# Patient Record
Sex: FEMALE | Race: WHITE | NOT HISPANIC OR LATINO | Employment: FULL TIME | URBAN - NONMETROPOLITAN AREA
[De-identification: names, ages, dates, MRNs, and addresses within clinical notes are randomized per-mention and may not be internally consistent; named-entity substitution may affect disease eponyms.]

---

## 2017-03-01 ENCOUNTER — COMMUNICATION - GICH (OUTPATIENT)
Dept: FAMILY MEDICINE | Facility: OTHER | Age: 42
End: 2017-03-01

## 2017-03-01 ENCOUNTER — HISTORY (OUTPATIENT)
Dept: EMERGENCY MEDICINE | Facility: OTHER | Age: 42
End: 2017-03-01

## 2017-03-01 DIAGNOSIS — F41.1 GENERALIZED ANXIETY DISORDER: ICD-10-CM

## 2017-03-05 ENCOUNTER — COMMUNICATION - GICH (OUTPATIENT)
Dept: FAMILY MEDICINE | Facility: OTHER | Age: 42
End: 2017-03-05

## 2017-03-05 DIAGNOSIS — F41.1 GENERALIZED ANXIETY DISORDER: ICD-10-CM

## 2017-05-05 ENCOUNTER — COMMUNICATION - GICH (OUTPATIENT)
Dept: FAMILY MEDICINE | Facility: OTHER | Age: 42
End: 2017-05-05

## 2017-07-22 ENCOUNTER — COMMUNICATION - GICH (OUTPATIENT)
Dept: FAMILY MEDICINE | Facility: OTHER | Age: 42
End: 2017-07-22

## 2017-07-22 DIAGNOSIS — F41.1 GENERALIZED ANXIETY DISORDER: ICD-10-CM

## 2017-08-28 ENCOUNTER — COMMUNICATION - GICH (OUTPATIENT)
Dept: FAMILY MEDICINE | Facility: OTHER | Age: 42
End: 2017-08-28

## 2017-08-28 DIAGNOSIS — F41.1 GENERALIZED ANXIETY DISORDER: ICD-10-CM

## 2017-09-04 ENCOUNTER — COMMUNICATION - GICH (OUTPATIENT)
Dept: FAMILY MEDICINE | Facility: OTHER | Age: 42
End: 2017-09-04

## 2017-09-04 DIAGNOSIS — F41.1 GENERALIZED ANXIETY DISORDER: ICD-10-CM

## 2017-12-28 NOTE — TELEPHONE ENCOUNTER
Patient Information     Patient Name MRN Dina Peacock [id number] Female 1975      Telephone Encounter by Shannan Pederson RN at 2017  3:16 PM     Author:  Shannan Pederson RN Service:  (none) Author Type:  NURS- Registered Nurse     Filed:  2017  3:19 PM Encounter Date:  2017 Status:  Signed     :  Shannan Pederson RN (NURS- Registered Nurse)            Depression-in adults 18 and over    Office visit in the past 12 months or as indicated in chart.  Should have clinic visit 1-2 months after initial prescription.    Last visit with SANDY ANDRADE was on: 2016 in [facility]  Next visit with SANDY ANDRADE is on: No future appointment listed with this provider  Next visit with Family Practice is on: No future appointment listed in this department    Max refills 12 months from last office visit or per providers notes.    Depression-in adults 18 and over  SSRI    Office visit in the past 12 months or as indicated in chart.  Should have clinic visit 1-2 months after initial prescription.    Max refills 12 months from last office visit or per providers notes.    Patient is due for medication management appointment. Limited refill provided at this time and letter sent for reminder to patient. Prescription refilled per RN Medication Refill Policy.................... Shannan Pederson RN ....................  2017   3:16 PM

## 2017-12-28 NOTE — TELEPHONE ENCOUNTER
Patient Information     Patient Name MRN Sex Dina Spence [id number] Female 1975      Telephone Encounter by Andrew Jarrell RN at 2017  3:10 PM     Author:  Andrew Jarrell RN Service:  (none) Author Type:  NURS- Registered Nurse     Filed:  2017  3:33 PM Encounter Date:  2017 Status:  Signed     :  Andrew Jarrell RN (NURS- Registered Nurse)            This is a Refill request from: [facility] in [facility]  Name of Medication: Sertraline  Quantity requested: 180 tabs  Last fill date: 8/3/17 for a months supply as per rx request  Due for refill: Yes, as per rx request and per chart review  Last visit with LANRE ANDRADE was on: 2016 in [facility]  PCP:  Lanre Andrade MD  Controlled Substance Agreement: N/A   Diagnosis r/t this medication request: Anxiety State     Name of Medication: Wellbutrin  Quantity requested: 180 tabs  Last fill date: 8/3/17 as per rx request for a 30 day supply  Due for refill: Yes, as per chart review and per rx request  Diagnosis r/t this medication request: Anxiety State    Chart review shows that patient remains overdue despite reminders for an office visit with PCP to discuss continued use of both requested rxs (See 3/1/17 and 17 refill requests). Writer attempted to contact patient today to discuss without success. Writer is no longer able to fill rxs as requested. Writer will route rx request to PCP for his consideration/approval. Will send patient an additional reminder letter as well.     Unable to complete prescription refill per RN Medication Refill Policy.................... Andrew Jarrell RN ....................  2017   3:13 PM

## 2017-12-28 NOTE — TELEPHONE ENCOUNTER
Patient Information     Patient Name MRN Sex Dina Spence [id number] Female 1975      Telephone Encounter by Andrew Jarrell RN at 2017 11:23 AM     Author:  Andrew Jarrell RN Service:  (none) Author Type:  NURS- Registered Nurse     Filed:  2017 12:33 PM Encounter Date:  2017 Status:  Signed     :  Andrew Jarrell RN (NURS- Registered Nurse)            This is a Refill request from: [facility] in [facility]  Name of Medication: Buspar  Quantity requested: 180 tabs  Last fill date: 8/3/17 as per rx request  Due for refill: Yes, as per rx request and per chart review  Last visit with LANRE ANDRADE was on: 2016 in [facility]  PCP:  Lanre Andrade MD  Controlled Substance Agreement: N/A   Diagnosis r/t this medication request: Anxiety state    Chart review shows that patient remains overdue for an office visit with PCP despite reminder letters sent x 2 to patient, as well as [employer] messages. No appointment noted at this time. Call placed to patient to discuss at this time. Patient states she is aware of need to be seen, however is not able to schedule an appointment at this time. Will call back later. Writer will pend limited refill to PCP for his consideration/approval.     Unable to complete prescription refill per RN Medication Refill Policy.................... Andrew Jarrell RN ....................  2017   11:23 AM

## 2018-01-03 ENCOUNTER — COMMUNICATION - GICH (OUTPATIENT)
Dept: FAMILY MEDICINE | Facility: OTHER | Age: 43
End: 2018-01-03

## 2018-01-03 DIAGNOSIS — F41.1 GENERALIZED ANXIETY DISORDER: ICD-10-CM

## 2018-01-03 NOTE — TELEPHONE ENCOUNTER
Patient Information     Patient Name MRN Dina Peacock [id number] Female 1975      Telephone Encounter by Andrew Jarrell RN at 3/2/2017 10:04 AM     Author:  Andrew Jarrell RN Service:  (none) Author Type:  NURS- Registered Nurse     Filed:  3/2/2017 10:15 AM Encounter Date:  3/1/2017 Status:  Signed     :  Andrew Jarrell RN (NURS- Registered Nurse)            Depression-in adults 18 and over  SSRI    Office visit in the past 12 months or as indicated in chart.  Should have clinic visit 1-2 months after initial prescription.    Last visit with SANDY ANDRADE was on: 2016 in [facility]  Next visit with SANDY ANDRADE is on: No future appointment listed with this provider  Next visit with Family Practice is on: No future appointment listed in this department    Max refills 12 months from last office visit or per providers notes.    PHQ Depression Screening 2015 6/3/2016   Date of PHQ exam (doc flow) 2015 6/3/2016   1. Lack of interest/pleasure 0 - Not at all 3 - Nearly every day   2. Feeling down/depressed 0 - Not at all 3 - Nearly every day   PHQ-2 TOTAL SCORE 0 6   3. Trouble sleeping 1 - Several days 3 - Nearly every day   4. Decreased energy 1 - Several days 2 - More than half the days   5. Appetite change 1 - Several days 3 - Nearly every day   6. Feelings of failure 0 - Not at all 1 - Several days   7. Trouble concentrating 0 - Not at all 2 - More than half the days   8. Activity level 0 - Not at all 1 - Several days   9. Hurting yourself 0 - Not at all 0 - Not at all   PHQ-9 TOTAL SCORE 3 18   PHQ-9 Severity Level none moderately severe   Functional Impairment not difficult at all very difficult     Patient is due for medication management/physical appointment as last office visit with PCP to address dx of anxiety as well as continued use of sertraline noted to be on 12/4/15. Limited refill provided at this time and letter/MyChart message sent for reminder to  patient. Prescription refilled per RN Medication Refill Policy.................... Andrew Jarrell RN ....................  3/2/2017   10:07 AM

## 2018-01-03 NOTE — TELEPHONE ENCOUNTER
Patient Information     Patient Name MRN Sex Dina Spence [id number] Female 1975      Telephone Encounter by Bell Beckham RN at 3/6/2017  3:22 PM     Author:  Bell Beckham RN Service:  (none) Author Type:  NURS- Registered Nurse     Filed:  3/6/2017  3:24 PM Encounter Date:  3/5/2017 Status:  Signed     :  Bell Beckham RN (NURS- Registered Nurse)            Depression-in adults 18 and over  Office visit in the past 12 months or as indicated in chart.  Should have clinic visit 1-2 months after initial prescription.  Last visit with SANDY ANDRADE was on: 2016 in [facility]  Next visit with SANDY ANDRADE is on: No future appointment listed with this provider  Max refills 12 months from last office visit or per providers notes.  Prescription refilled per RN Medication Refill Policy.................... Bell Beckham RN ....................  3/6/2017   3:23 PM

## 2018-01-03 NOTE — TELEPHONE ENCOUNTER
Patient Information     Patient Name MRN Sex Dina Spence [id number] Female 1975      Telephone Encounter by Andrwe Jarrell RN at 3/2/2017 10:09 AM     Author:  Andrew Jarrell RN Service:  (none) Author Type:  NURS- Registered Nurse     Filed:  3/2/2017 10:15 AM Encounter Date:  3/1/2017 Status:  Signed     :  Andrew Jarrell RN (NURS- Registered Nurse)            This is a Refill request from: [facility]  Name of Medication: Buspar  Quantity requested: 180 tabs  Last fill date: 11/10/16  Due for refill: Yes  Last visit with SANDY SHAH was on: 2016 in [facility]  PCP:  Sandy Shah MD  Controlled Substance Agreement:  N/A   Diagnosis r/t this medication request: Anxiety state    Per chart review, patient was last seen by PCP for dx of anxiety on 12/4/15. However, continued use of buspar was addressed at office visit with PCP on 6/3/16. Patient is due for a renewal of other medication (Sertraline), was sent in a limited supply today. Will pend limited supply of buspar to PCP for his consideration/approval as well.     Unable to complete prescription refill per RN Medication Refill Policy.................... Andrew Jarrell RN ....................  3/2/2017   10:10 AM

## 2018-01-04 NOTE — TELEPHONE ENCOUNTER
Patient Information     Patient Name MRN Sex Dina Spence [id number] Female 1975      Telephone Encounter by Andrew Jarrell RN at 2017  3:16 PM     Author:  Andrew Jarrell RN Service:  (none) Author Type:  NURS- Registered Nurse     Filed:  2017  3:20 PM Encounter Date:  2017 Status:  Signed     :  Andrew Jarrell RN (NURS- Registered Nurse)            Faxed rx request received from [facility]. Faxed rx request was for HCTZ only. Chart review shows that patient is currently prescribed Lisinopril/HCTZ and not just HCTZ. Current rx as listed below and is not due for refill until 2017.     Prescribing Provider: Lanre Shah MD                   Order Date: 2016  Ordered by: LANRE SHAH  Medication:lisinopril-hydrochlorothiazide, 20-25 mg, (PRINZIDE, ZESTORETIC)              20-25 mg per tablet    Qty:90 tablet   Ref:3  Start:6/3/2016    End:              Route:Oral                  SHAQ:No   Class:eRx    Sig:Take 1 tablet by mouth once daily.    Pharmacy:[id number] [address]    Call placed to pharmacy to discuss. Spoke to Ruslan, pharmacy tech. Advised her of above. Ruslan notes that patient is only prescribed rx as noted above. Will make note that rx is not due for refill until . Writer will disregard rx request at this time.    Unable to complete prescription refill per RN Medication Refill Policy.................... Andrew Jarrell RN ....................  2017   3:20 PM

## 2018-01-24 ENCOUNTER — DOCUMENTATION ONLY (OUTPATIENT)
Dept: FAMILY MEDICINE | Facility: OTHER | Age: 43
End: 2018-01-24

## 2018-01-24 PROBLEM — F41.1 ANXIETY STATE: Status: ACTIVE | Noted: 2018-01-24

## 2018-01-24 PROBLEM — E66.9 OBESITY: Status: ACTIVE | Noted: 2018-01-24

## 2018-01-24 PROBLEM — K21.9 GASTROESOPHAGEAL REFLUX DISEASE: Status: ACTIVE | Noted: 2018-01-24

## 2018-01-24 RX ORDER — BUPROPION HYDROCHLORIDE 150 MG/1
150 TABLET, EXTENDED RELEASE ORAL 2 TIMES DAILY
COMMUNITY
Start: 2018-01-08 | End: 2019-01-29

## 2018-01-24 RX ORDER — SERTRALINE HYDROCHLORIDE 100 MG/1
200 TABLET, FILM COATED ORAL DAILY
COMMUNITY
Start: 2018-01-08 | End: 2019-01-29

## 2018-01-24 RX ORDER — LISINOPRIL AND HYDROCHLOROTHIAZIDE 20; 25 MG/1; MG/1
1 TABLET ORAL DAILY
COMMUNITY
Start: 2016-06-03 | End: 2019-01-29

## 2018-01-24 RX ORDER — CLONAZEPAM 0.5 MG/1
.5-1 TABLET ORAL 2 TIMES DAILY PRN
COMMUNITY
Start: 2016-09-15 | End: 2019-01-29

## 2018-01-24 RX ORDER — ALBUTEROL SULFATE 90 UG/1
1-2 AEROSOL, METERED RESPIRATORY (INHALATION) EVERY 6 HOURS PRN
COMMUNITY
Start: 2015-12-04 | End: 2021-08-19

## 2018-01-24 RX ORDER — BUSPIRONE HYDROCHLORIDE 10 MG/1
10 TABLET ORAL 2 TIMES DAILY PRN
COMMUNITY
Start: 2018-01-08 | End: 2019-01-29

## 2018-01-24 RX ORDER — CYCLOBENZAPRINE HCL 10 MG
10 TABLET ORAL 3 TIMES DAILY
COMMUNITY
Start: 2017-03-01 | End: 2019-01-29

## 2018-02-12 NOTE — TELEPHONE ENCOUNTER
Patient Information     Patient Name MRN Dina Peacock [id number] Female 1975      Telephone Encounter by Andrew Jarrell RN at 2018  8:38 AM     Author:  Andrew Jarrell RN Service:  (none) Author Type:  NURS- Registered Nurse     Filed:  2018  8:44 AM Encounter Date:  1/3/2018 Status:  Signed     :  Andrew Jarrell RN (NURS- Registered Nurse)            Writer contacted patient again. Was able to reach patient at this time. Patient was advised that she is due for an annual office visit with PCP as well as a physical. Patient states understanding. States she is at work at this time, so she cannot schedule an appointment. States she will call back. Writer will aaron up all 3 rxs as requested for a limited supply at this time, and will route to PCP for his consideration/approval.    Unable to complete prescription refill per RN Medication Refill Policy.................... Andrew Jarrell RN ....................  2018   8:40 AM

## 2018-02-12 NOTE — TELEPHONE ENCOUNTER
Patient Information     Patient Name MRN Dina Peacock [id number] Female 1975      Telephone Encounter by Shannan Cortez RN at 2018  8:07 AM     Author:  Shannan Cortez RN Service:  (none) Author Type:  NURS- Registered Nurse     Filed:  2018  8:24 AM Encounter Date:  1/3/2018 Status:  Signed     :  Shannan Cortez RN (NURS- Registered Nurse)            Patient was last seen by PCP for blood pressure and depression on 2016. Patient's last physical was completed more than 2 years ago. Patient has been sent 5 reminder letters and has no upcoming appointments noted.    Failed attempt to reach Patient by telephone. Will try again.    Shannan Cortez RN .............. 2018  8:24 AM

## 2018-07-23 NOTE — PROGRESS NOTES
Patient Information     Patient Name  Dina Barraza MRN  [id number] Sex  Female   1975      Letter by Lanre Shah MD at      Author:  Lanre Shah MD Service:  (none) Author Type:  (none)    Filed:   Encounter Date:  3/1/2017 Status:  (Other)           Dina Barraza  [address]  [address] [facility] [address]          2017    Dear Ms. Barraza:    This is to remind you that you are due for your 1 year physical appointment with Lanre Shah MD as well as to review diagnosis of anxiety and continued use of Buspar/Zoloft.  Your last visit was on 2015. Additional refills of your medication require you to complete this visit.    Please call [phone] to schedule your appointment.    Thank you for choosing [facility] for your health care needs.    Sincerely,      Refill RN  [facility]

## 2018-07-23 NOTE — PROGRESS NOTES
Patient Information     Patient Name  Dina Barraza MRN  [id number] Sex  Female   1975      Letter by Lanre Shah MD at      Author:  Lanre Shah MD Service:  (none) Author Type:  (none)    Filed:   Encounter Date:  2017 Status:  (Other)           Dina Barraza  [address] [facility] [address]          2017    Dear Ms. Barraza:    This letter is to remind you that you are due for your annual exam with Lanre Shah MD . Your last comprehensive medication visit was more than 12 months ago.     A LIMITED refill of  buPROPion (WELLBUTRIN SR) 150 mg Sustained-Release tablet, sertraline (ZOLOFT) 100 mg tablet has been called into your pharmacy.    Additional refills require an annual medication management appointment with Lanre Shah MD. Please call the clinic at [phone] to schedule your appointment.    Please call to inform us if you no longer see Lanre Shah MD for primary care, doing so will remove you from our call/contact list.    Thank you,    The Refill Nurse  [facility]

## 2019-01-17 DIAGNOSIS — F41.1 ANXIETY STATE: Primary | ICD-10-CM

## 2019-01-17 RX ORDER — SERTRALINE HYDROCHLORIDE 100 MG/1
200 TABLET, FILM COATED ORAL DAILY
OUTPATIENT
Start: 2019-01-17

## 2019-01-17 RX ORDER — BUSPIRONE HYDROCHLORIDE 10 MG/1
10 TABLET ORAL 2 TIMES DAILY PRN
OUTPATIENT
Start: 2019-01-17

## 2019-01-17 NOTE — TELEPHONE ENCOUNTER
Refill request from  for:  Buspirone 10 mg and sertraline 100 mg     Noted medications were filled for limited time (90 day supply) 1/8/2018-Encounter 1/3/2018.  Pt was contacted at that time and advised to f/u.  Pt stated they would call and schedule appt.  No appt there after.      Pt needs appt and would have run out around 3/2018.     Will refuse at this time and send letter to pt.    Medication Detail    Disp Refills Start End SHAQ   busPIRone (BUSPAR) 10 mg tablet 180 tablet 0 1/8/2018  No   Sig: Take 1 tablet by mouth 2 times daily if needed.   Sent to pharmacy as: busPIRone 10 mg tablet   Class: eRx   Route: Oral   E-Prescribing Status: Receipt confirmed by pharmacy (1/8/2018  8:57 AM CST)   Associated Diagnoses   Anxiety state             Medication Detail    Disp Refills Start End SHAQ   sertraline (ZOLOFT) 100 mg tablet 180 tablet 0 1/8/2018  No   Sig: Take 2 tablets by mouth once daily.   Sent to pharmacy as: sertraline 100 mg tablet   Class: eRx   Route: Oral   E-Prescribing Status: Receipt confirmed by pharmacy (1/8/2018  8:57 AM CST)   Associated Diagnoses   Anxiety state         Unable to complete prescription refill per RN Medication Refill Policy.................... Taty See ....................  1/17/2019   8:27 AM

## 2019-01-17 NOTE — LETTER
January 17, 2019      Dina Barraza  [address]  [place] [facility] [address]        Dear Dina,       A refill of Buspirone 10 mg and sertraline 100 mg have been requested by your pharmacy and we noticed that you are overdue for an annual exam.    We are unable to fulfil this refill request at this time.  Refills require a medication management appointment with your provider.    Your health is very important to us.  Please call the clinic at [phone] to schedule your medication management appointment.      Thank you for choosing [facility] for your health care needs.    Sincerely,    Refill RN  [facility]

## 2019-01-29 ENCOUNTER — OFFICE VISIT (OUTPATIENT)
Dept: FAMILY MEDICINE | Facility: OTHER | Age: 44
End: 2019-01-29
Attending: FAMILY MEDICINE
Payer: COMMERCIAL

## 2019-01-29 VITALS
HEIGHT: 64 IN | DIASTOLIC BLOOD PRESSURE: 104 MMHG | HEART RATE: 88 BPM | WEIGHT: 268.6 LBS | BODY MASS INDEX: 45.85 KG/M2 | SYSTOLIC BLOOD PRESSURE: 148 MMHG

## 2019-01-29 DIAGNOSIS — Z00.00 ROUTINE HISTORY AND PHYSICAL EXAMINATION OF ADULT: Primary | ICD-10-CM

## 2019-01-29 DIAGNOSIS — F41.1 ANXIETY STATE: ICD-10-CM

## 2019-01-29 DIAGNOSIS — N92.0 MENORRHAGIA WITH REGULAR CYCLE: ICD-10-CM

## 2019-01-29 DIAGNOSIS — I10 ESSENTIAL HYPERTENSION: ICD-10-CM

## 2019-01-29 DIAGNOSIS — J45.20 MILD INTERMITTENT ASTHMA WITHOUT COMPLICATION: ICD-10-CM

## 2019-01-29 DIAGNOSIS — E66.01 MORBID OBESITY (H): ICD-10-CM

## 2019-01-29 PROCEDURE — 90714 TD VACC NO PRESV 7 YRS+ IM: CPT

## 2019-01-29 PROCEDURE — 99396 PREV VISIT EST AGE 40-64: CPT | Performed by: FAMILY MEDICINE

## 2019-01-29 PROCEDURE — 90471 IMMUNIZATION ADMIN: CPT

## 2019-01-29 PROCEDURE — G0463 HOSPITAL OUTPT CLINIC VISIT: HCPCS

## 2019-01-29 RX ORDER — BUSPIRONE HYDROCHLORIDE 10 MG/1
10 TABLET ORAL 2 TIMES DAILY PRN
Qty: 180 TABLET | Refills: 0 | Status: SHIPPED | OUTPATIENT
Start: 2019-01-29 | End: 2019-07-24

## 2019-01-29 ASSESSMENT — PATIENT HEALTH QUESTIONNAIRE - PHQ9
5. POOR APPETITE OR OVEREATING: MORE THAN HALF THE DAYS
SUM OF ALL RESPONSES TO PHQ QUESTIONS 1-9: 3

## 2019-01-29 ASSESSMENT — ANXIETY QUESTIONNAIRES
1. FEELING NERVOUS, ANXIOUS, OR ON EDGE: MORE THAN HALF THE DAYS
IF YOU CHECKED OFF ANY PROBLEMS ON THIS QUESTIONNAIRE, HOW DIFFICULT HAVE THESE PROBLEMS MADE IT FOR YOU TO DO YOUR WORK, TAKE CARE OF THINGS AT HOME, OR GET ALONG WITH OTHER PEOPLE: SOMEWHAT DIFFICULT
2. NOT BEING ABLE TO STOP OR CONTROL WORRYING: MORE THAN HALF THE DAYS
GAD7 TOTAL SCORE: 11
6. BECOMING EASILY ANNOYED OR IRRITABLE: SEVERAL DAYS
5. BEING SO RESTLESS THAT IT IS HARD TO SIT STILL: SEVERAL DAYS
3. WORRYING TOO MUCH ABOUT DIFFERENT THINGS: MORE THAN HALF THE DAYS
7. FEELING AFRAID AS IF SOMETHING AWFUL MIGHT HAPPEN: SEVERAL DAYS

## 2019-01-29 ASSESSMENT — MIFFLIN-ST. JEOR: SCORE: 1855.42

## 2019-01-29 ASSESSMENT — PAIN SCALES - GENERAL: PAINLEVEL: MILD PAIN (2)

## 2019-01-29 NOTE — NURSING NOTE
Patient presents today for annual physical and is due for a PAP.  Medication Reconciliation Complete    Jessica Rodriguez LPN  1/29/2019 3:18 PM

## 2019-01-29 NOTE — PROGRESS NOTES
"SUBJECTIVE:  Dina Barraza is a 43 year old female here for annual exam.  She has not been seen since 2016.  History of anxiety and she was able to titrate herself off of sertraline a couple of years ago.  However she reports that her anxiety is worsening.  She wanted to try to treat her anxiety \"naturally.\"  She is interested in restarting her medications.    She has a history of asthma and she uses Qvar occasionally she does not think that albuterol is all that helpful.    She reports that she been having some heavy menstrual cycles over the last couple of months.  She also had intermenstrual bleeding.  She has had increasing cramping in her lower abdomen.  She is status post tubal ligation.    She has quit smoking since we last saw her.      Patient Active Problem List    Diagnosis Date Noted     Morbid obesity (H) 01/29/2019     Priority: Medium     Anxiety state 01/24/2018     Priority: Medium     Gastroesophageal reflux disease 01/24/2018     Priority: Medium     Obesity 01/24/2018     Priority: Medium     HTN (hypertension) 06/03/2016     Priority: Medium     Mild intermittent asthma without complication 02/21/2013     Priority: Medium     Overview:   Illness-induced         Past Medical History:   Diagnosis Date     Major depressive disorder, single episode     During an unhealthy past marriage     Mild intermittent asthma, uncomplicated     Mild intermittent asthma       Past Surgical History:   Procedure Laterality Date     LAPAROSCOPIC CHOLECYSTECTOMY      08/09/02     LAPAROSCOPIC TUBAL LIGATION      2005       Current Outpatient Medications   Medication Sig Dispense Refill     albuterol (PROAIR HFA/PROVENTIL HFA/VENTOLIN HFA) 108 (90 BASE) MCG/ACT Inhaler Inhale 1-2 puffs into the lungs every 6 hours as needed       beclomethasone HFA (QVAR REDIHALER) 40 MCG/ACT inhaler Inhale 1 puff into the lungs 2 times daily 1 Inhaler 11     busPIRone (BUSPAR) 10 MG tablet Take 1 tablet (10 mg) by mouth 2 times " "daily as needed 180 tablet 0     sertraline (ZOLOFT) 50 MG tablet Take 1-2 tablets ( mg) by mouth daily 60 tablet 1       Allergies:  Allergies   Allergen Reactions     Ranitidine Hives     Sulfa Drugs Rash     Sulfamethoxazole-Trimethoprim Rash       Family History   Problem Relation Age of Onset     Diabetes Mother         Diabetes     Hypertension Mother         Hypertension     Diabetes Father         Diabetes,type 2     Heart Disease Father         Heart Disease, MI, CVA and CABG     Heart Disease Paternal Grandfather         Heart Disease,MI age 60,      Hypertension Maternal Grandmother         Hypertension     Diabetes Maternal Aunt         Diabetes,type 2     Diabetes Maternal Uncle         Diabetes,type 2     Family History Negative Son         Good Health     Family History Negative Son         Good Health,Asthma     Family History Negative Daughter         Good Health,Asthma       Social History     Tobacco Use     Smoking status: Former Smoker     Packs/day: 0.50     Years: 12.00     Pack years: 6.00     Types: Cigarettes     Last attempt to quit: 2018     Years since quittin.0     Smokeless tobacco: Never Used   Substance Use Topics     Alcohol use: Yes     Comment: Alcoholic Drinks/day: rare     Drug use: Unknown     Types: Other     Comment: Drug use: No       ROS:    As above otherwise ROS is unremarkable.      OBJECTIVE:  BP (!) 148/104   Pulse 88   Ht 1.613 m (5' 3.5\")   Wt 121.8 kg (268 lb 9.6 oz)   LMP 2019   Breastfeeding? No   BMI 46.83 kg/m      EXAM:  General Appearance: Pleasant, alert, appropriate appearance for age. No acute distress  Head: Normal. Normocephalic, atraumatic.  Eyes: PERRL, EOMI  Ears: Normal TM's bilaterally. Normal auditory canals and external ears.   OroPharynx: Dental hygiene adequate. Normal buccal mucosa. Normal pharynx.  Neck: Supple, no masses or nodes, no lymphadenopathy.  No thyromegaly.  Lungs: Normal chest wall and respirations. " Clear to auscultation, no wheezes or crackles.  Cardiovascular: Regular rate and rhythm. S1, S2, no murmurs.  Gastrointestinal: Soft, nontender, no abnormal masses or organomegaly. BS normal.  Musculoskeletal: No edema.  Skin: no concerning or new rashes.  Neurologic Exam: CN 2-12 grossly intact.  Normal gait.  Symmetric DTRs, No focal motor or sensory deficits. No tremor.  Psychiatric Exam: Alert and oriented, appropriate affect.    ASSESSEMENT AND PLAN:    1. Routine history and physical examination of adult    2. Essential hypertension    3. Morbid obesity (H)    4. Anxiety state    5. Mild intermittent asthma without complication    6. Menorrhagia with regular cycle      Will update tetanus today.    Her blood pressure at home and at work is in the 130s over 70s.  She thinks that she may be nervous as her daughter is currently getting a Nexplanon implanted right now.  I discussed improving her diet and increasing her daily exercise as well as weight loss to help this and continue to monitor at home.    In regards to her anxiety we will restart sertraline 50 mg daily for the first month and she can increase to 100 mg daily after that.  We will also restart BuSpar.  She will follow-up in 2 months for reassessment.    For follow-up at her leisure for fasting lipids and comp panel.  We will also check FSH, LH, TSH and estradiol given her menstrual cycle changes.    Tobias Shah MD  Family Medicine      This document was prepared using voice generated software.  While every attempt was made for accuracy, grammatical errors may exist.

## 2019-01-29 NOTE — LETTER
My Asthma Action Plan  Name: Dina Barraza   YOB: 1975  Date: 1/29/2019   My doctor: Lanre Shah MD   My clinic: [facility]        My Control Medicine: Beclomethasone (QVar) -  40 mcg daily  My Rescue Medicine: Albuterol (Proair/Ventolin/Proventil) inhaler as needed   My Asthma Severity: intermittent  Avoid your asthma triggers: Patient is unaware of triggers               GREEN ZONE   Good Control    I feel good    No cough or wheeze    Can work, sleep and play without asthma symptoms       Take your asthma control medicine every day.     1. If exercise triggers your asthma, take your rescue medication    15 minutes before exercise or sports, and    During exercise if you have asthma symptoms  2. Spacer to use with inhaler: If you have a spacer, make sure to use it with your inhaler             YELLOW ZONE Getting Worse  I have ANY of these:    I do not feel good    Cough or wheeze    Chest feels tight    Wake up at night   1. Keep taking your Green Zone medications  2. Start taking your rescue medicine:    every 20 minutes for up to 1 hour. Then every 4 hours for 24-48 hours.  3. If you stay in the Yellow Zone for more than 12-24 hours, contact your doctor.  4. If you do not return to the Green Zone in 12-24 hours or you get worse, start taking your oral steroid medicine if prescribed by your provider.           RED ZONE Medical Alert - Get Help  I have ANY of these:    I feel awful    Medicine is not helping    Breathing getting harder    Trouble walking or talking    Nose opens wide to breathe       1. Take your rescue medicine NOW  2. If your provider has prescribed an oral steroid medicine, start taking it NOW  3. Call your doctor NOW  4. If you are still in the Red Zone after 20 minutes and you have not reached your doctor:    Take your rescue medicine again and    Call 911 or go to the emergency room right away    See your regular doctor within 2 weeks of an Emergency  Room or Urgent Care visit for follow-up treatment.          Annual Reminders:  Meet with Asthma Educator,  Flu Shot in the Fall, consider Pneumonia Vaccination for patients with asthma (aged 19 and older).    Pharmacy: Data Unavailable                      Asthma Triggers  How To Control Things That Make Your Asthma Worse    Triggers are things that make your asthma worse.  Look at the list below to help you find your triggers and what you can do about them.  You can help prevent asthma flare-ups by staying away from your triggers.      Trigger                                                          What you can do   Cigarette Smoke  Tobacco smoke can make asthma worse. Do not allow smoking in your home, car or around you.  Be sure no one smokes at a child s day care or school.  If you smoke, ask your health care provider for ways to help you quit.  Ask family members to quit too.  Ask your health care provider for a referral to Quit Plan to help you quit smoking, or call 8-048-895-PLAN.     Colds, Flu, Bronchitis  These are common triggers of asthma. Wash your hands often.  Don t touch your eyes, nose or mouth.  Get a flu shot every year.     Dust Mites  These are tiny bugs that live in cloth or carpet. They are too small to see. Wash sheets and blankets in hot water every week.   Encase pillows and mattress in dust mite proof covers.  Avoid having carpet if you can. If you have carpet, vacuum weekly.   Use a dust mask and HEPA vacuum.   Pollen and Outdoor Mold  Some people are allergic to trees, grass, or weed pollen, or molds. Try to keep your windows closed.  Limit time out doors when pollen count is high.   Ask you health care provider about taking medicine during allergy season.     Animal Dander  Some people are allergic to skin flakes, urine or saliva from pets with fur or feathers. Keep pets with fur or feathers out of your home.    If you can t keep the pet outdoors, then keep the pet out of your bedroom.   Keep the bedroom door closed.  Keep pets off cloth furniture and away from stuffed toys.     Mice, Rats, and Cockroaches  Some people are allergic to the waste from these pests.   Cover food and garbage.  Clean up spills and food crumbs.  Store grease in the refrigerator.   Keep food out of the bedroom.   Indoor Mold  This can be a trigger if your home has high moisture. Fix leaking faucets, pipes, or other sources of water.   Clean moldy surfaces.  Dehumidify basement if it is damp and smelly.   Smoke, Strong Odors, and Sprays  These can reduce air quality. Stay away from strong odors and sprays, such as perfume, powder, hair spray, paints, smoke incense, paint, cleaning products, candles and new carpet.   Exercise or Sports  Some people with asthma have this trigger. Be active!  Ask your doctor about taking medicine before sports or exercise to prevent symptoms.    Warm up for 5-10 minutes before and after sports or exercise.     Other Triggers of Asthma  Cold air:  Cover your nose and mouth with a scarf.  Sometimes laughing or crying can be a trigger.  Some medicines and food can trigger asthma.

## 2019-01-30 ASSESSMENT — ASTHMA QUESTIONNAIRES: ACT_TOTALSCORE: 21

## 2019-01-30 ASSESSMENT — ANXIETY QUESTIONNAIRES: GAD7 TOTAL SCORE: 11

## 2019-05-10 ENCOUNTER — HOSPITAL ENCOUNTER (EMERGENCY)
Facility: OTHER | Age: 44
Discharge: HOME | End: 2019-05-10
Attending: PHYSICIAN ASSISTANT | Admitting: PHYSICIAN ASSISTANT

## 2019-05-10 VITALS
RESPIRATION RATE: 18 BRPM | DIASTOLIC BLOOD PRESSURE: 84 MMHG | HEIGHT: 65 IN | BODY MASS INDEX: 43.32 KG/M2 | SYSTOLIC BLOOD PRESSURE: 151 MMHG | TEMPERATURE: 98 F | WEIGHT: 260 LBS | OXYGEN SATURATION: 100 %

## 2019-05-10 DIAGNOSIS — R59.0 CERVICAL ADENOPATHY: ICD-10-CM

## 2019-05-10 DIAGNOSIS — R50.9 FEVER: ICD-10-CM

## 2019-05-10 DIAGNOSIS — R13.10 DIFFICULTY SWALLOWING: ICD-10-CM

## 2019-05-10 DIAGNOSIS — J02.9 PHARYNGITIS: ICD-10-CM

## 2019-05-10 LAB
DEPRECATED S PYO AG THROAT QL EIA: NORMAL
SPECIMEN SOURCE: NORMAL

## 2019-05-10 PROCEDURE — 87880 STREP A ASSAY W/OPTIC: CPT | Performed by: EMERGENCY MEDICINE

## 2019-05-10 PROCEDURE — 25000128 H RX IP 250 OP 636: Performed by: PHYSICIAN ASSISTANT

## 2019-05-10 PROCEDURE — 25000125 ZZHC RX 250: Performed by: PHYSICIAN ASSISTANT

## 2019-05-10 PROCEDURE — 99284 EMERGENCY DEPT VISIT MOD MDM: CPT | Mod: 25 | Performed by: PHYSICIAN ASSISTANT

## 2019-05-10 PROCEDURE — 96372 THER/PROPH/DIAG INJ SC/IM: CPT | Performed by: PHYSICIAN ASSISTANT

## 2019-05-10 PROCEDURE — 99283 EMERGENCY DEPT VISIT LOW MDM: CPT | Mod: Z6 | Performed by: PHYSICIAN ASSISTANT

## 2019-05-10 RX ORDER — METHYLPREDNISOLONE SOD SUCC 125 MG
80 VIAL (EA) INJECTION ONCE
Status: COMPLETED | OUTPATIENT
Start: 2019-05-10 | End: 2019-05-10

## 2019-05-10 RX ORDER — PREDNISONE 20 MG/1
TABLET ORAL
Qty: 10 TABLET | Refills: 0 | Status: SHIPPED | OUTPATIENT
Start: 2019-05-10 | End: 2019-06-22

## 2019-05-10 RX ORDER — CEFTRIAXONE SODIUM 1 G
1 VIAL (EA) INJECTION ONCE
Status: COMPLETED | OUTPATIENT
Start: 2019-05-10 | End: 2019-05-10

## 2019-05-10 RX ADMIN — LIDOCAINE HYDROCHLORIDE 5 ML: 20 SOLUTION ORAL; TOPICAL at 20:52

## 2019-05-10 RX ADMIN — METHYLPREDNISOLONE 80 MG: 125 INJECTION, POWDER, LYOPHILIZED, FOR SOLUTION INTRAMUSCULAR; INTRAVENOUS at 20:54

## 2019-05-10 RX ADMIN — LIDOCAINE HYDROCHLORIDE 1 G: 10 INJECTION, SOLUTION EPIDURAL; INFILTRATION; INTRACAUDAL; PERINEURAL at 20:53

## 2019-05-10 ASSESSMENT — ENCOUNTER SYMPTOMS
FEVER: 1
CHEST TIGHTNESS: 0
VOMITING: 0
SORE THROAT: 1
CONSTIPATION: 0
CONFUSION: 0
DIARRHEA: 0
ADENOPATHY: 0
ABDOMINAL PAIN: 0
BRUISES/BLEEDS EASILY: 0
TROUBLE SWALLOWING: 1
NAUSEA: 0
APPETITE CHANGE: 1
HEMATURIA: 0
WOUND: 0
BACK PAIN: 0
CHILLS: 1
SHORTNESS OF BREATH: 0

## 2019-05-10 ASSESSMENT — MIFFLIN-ST. JEOR: SCORE: 1835.23

## 2019-05-10 NOTE — ED AVS SNAPSHOT
[facility]  [address]  [place] [address]  Phone:  [phone]  Fax:  [phone]                                    Dina Barraza   MRN: [id number]    Department:  [facility]   Date of Visit:  5/10/2019           After Visit Summary Signature Page    I have received my discharge instructions, and my questions have been answered. I have discussed any challenges I see with this plan with the nurse or doctor.    ..........................................................................................................................................  Patient/Patient Representative Signature      ..........................................................................................................................................  Patient Representative Print Name and Relationship to Patient    ..................................................               ................................................  Date                                   Time    ..........................................................................................................................................  Reviewed by Signature/Title    ...................................................              ..............................................  Date                                               Time          22EPIC Rev 08/18

## 2019-05-11 NOTE — ED PROVIDER NOTES
History     Chief Complaint   Patient presents with     Pharyngitis     since yesterday, subjective fever     This is a 44-year-old female who started getting a sore throat yesterday.  She thinks this may be strep pharyngitis.  Today she can hardly even talk and so painful to swallow.  She is asking for something for pain for this.  She is also noticed some swollen lymph nodes as well.  She has had some fever and chills.  Denies any other issues at this time.            Allergies:  Allergies   Allergen Reactions     Ranitidine Hives     Sulfa Drugs Rash     Sulfamethoxazole-Trimethoprim Rash       Problem List:    Patient Active Problem List    Diagnosis Date Noted     Morbid obesity (H) 2019     Priority: Medium     Anxiety state 2018     Priority: Medium     Gastroesophageal reflux disease 2018     Priority: Medium     Obesity 2018     Priority: Medium     HTN (hypertension) 2016     Priority: Medium     Mild intermittent asthma without complication 2013     Priority: Medium     Overview:   Illness-induced          Past Medical History:    Past Medical History:   Diagnosis Date     Major depressive disorder, single episode      Mild intermittent asthma, uncomplicated        Past Surgical History:    Past Surgical History:   Procedure Laterality Date     LAPAROSCOPIC CHOLECYSTECTOMY      02     LAPAROSCOPIC TUBAL LIGATION             Family History:    Family History   Problem Relation Age of Onset     Diabetes Mother         Diabetes     Hypertension Mother         Hypertension     Diabetes Father         Diabetes,type 2     Heart Disease Father         Heart Disease, MI, CVA and CABG     Heart Disease Paternal Grandfather         Heart Disease,MI age 60,      Hypertension Maternal Grandmother         Hypertension     Diabetes Maternal Aunt         Diabetes,type 2     Diabetes Maternal Uncle         Diabetes,type 2     Family History Negative Son         Good  "Health     Family History Negative Son         Good Health,Asthma     Family History Negative Daughter         Good Health,Asthma       Social History:  Marital Status:   [2]  Social History     Tobacco Use     Smoking status: Former Smoker     Packs/day: 0.50     Years: 12.00     Pack years: 6.00     Types: Cigarettes     Last attempt to quit: 2018     Years since quittin.3     Smokeless tobacco: Never Used   Substance Use Topics     Alcohol use: Yes     Comment: Alcoholic Drinks/day: rare     Drug use: Unknown     Types: Other     Comment: Drug use: No        Medications:      amoxicillin-clavulanate (AUGMENTIN) 875-125 MG tablet   lidocaine VISCOUS (XYLOCAINE) 2 % solution   predniSONE (DELTASONE) 20 MG tablet   albuterol (PROAIR HFA/PROVENTIL HFA/VENTOLIN HFA) 108 (90 BASE) MCG/ACT Inhaler   beclomethasone HFA (QVAR REDIHALER) 40 MCG/ACT inhaler   busPIRone (BUSPAR) 10 MG tablet   sertraline (ZOLOFT) 50 MG tablet         Review of Systems   Constitutional: Positive for appetite change, chills and fever.   HENT: Positive for sore throat and trouble swallowing. Negative for congestion.    Eyes: Negative for visual disturbance.   Respiratory: Negative for chest tightness and shortness of breath.    Cardiovascular: Negative for chest pain.   Gastrointestinal: Negative for abdominal pain, constipation, diarrhea, nausea and vomiting.   Genitourinary: Negative for hematuria.   Musculoskeletal: Negative for back pain.   Skin: Negative for rash and wound.   Neurological: Negative for syncope.   Hematological: Negative for adenopathy. Does not bruise/bleed easily.   Psychiatric/Behavioral: Negative for confusion.       Physical Exam   BP: 151/84  Heart Rate: 78  Temp: 98  F (36.7  C)  Resp: 18  Height: 165.1 cm (5' 5\")  Weight: 117.9 kg (260 lb)  SpO2: 100 %      Physical Exam   Constitutional: She is oriented to person, place, and time. She appears well-developed and well-nourished. No distress.   HENT: "   Head: Normocephalic and atraumatic.   Mouth/Throat: Oropharyngeal exudate and posterior oropharyngeal erythema present. No posterior oropharyngeal edema. Tonsils are 1+ on the right. Tonsils are 1+ on the left.   Eyes: Conjunctivae are normal. No scleral icterus.   Neck: Neck supple.   Cardiovascular: Normal rate and regular rhythm.   Pulmonary/Chest: Effort normal.   Abdominal: Soft. There is no tenderness.   Musculoskeletal: She exhibits no deformity.   Lymphadenopathy:     She has cervical adenopathy.   Neurological: She is alert and oriented to person, place, and time.   Skin: Skin is warm and dry. Capillary refill takes less than 2 seconds. No rash noted. She is not diaphoretic.   Psychiatric: She has a normal mood and affect.       ED Course        Procedures              Results for orders placed or performed during the hospital encounter of 05/10/19 (from the past 24 hour(s))   Rapid strep screen   Result Value Ref Range    Specimen Description Throat     Rapid Strep A Screen       Negative presumptive for Group A Beta Streptococcus       Medications   methylPREDNISolone sodium succinate (solu-MEDROL) injection 80 mg (has no administration in time range)   cefTRIAXone (ROCEPHIN) 1 g in lidocaine (PF) (XYLOCAINE) 1 % injection (1 g Intramuscular Given 5/10/19 2053)   lidocaine VISCOUS (XYLOCAINE) 2 % solution 5 mL (5 mLs Mouth/Throat Given 5/10/19 2052)       Assessments & Plan (with Medical Decision Making)     I have reviewed the nursing notes.    I have reviewed the findings, diagnosis, plan and need for follow up with the patient.         Medication List      Started    amoxicillin-clavulanate 875-125 MG tablet  Commonly known as:  AUGMENTIN  1 tablet, Oral, 2 TIMES DAILY     lidocaine VISCOUS 2 % solution  Commonly known as:  XYLOCAINE  15 mLs, Swish & Spit, EVERY 3 HOURS PRN, ; Max 8 doses/24 hour period.     predniSONE 20 MG tablet  Commonly known as:  DELTASONE  Take two tablets (= 40mg) each day  for 5 (five) days        [place] [facility] through [facility] since pharmacies are closed.  Final diagnoses:   Pharyngitis   Difficulty swallowing   Fever   Cervical adenopathy     Afebrile at this time.  Vital signs stable.  Severe sore throat with 2-day onset.  Difficulty swallowing or even talking.  Significant cervical adenopathy noted.  Strep test is negative culture is pending.  Patient was given Rocephin IM, viscous lidocaine and Solu-Medrol IM.  She felt the viscous lidocaine to help with her sore throat however pharmacies are closed.  Rx for short course of Norco No. 6 for pain relief.  Rx as well for Augmentin, viscous lidocaine, and prednisone taper.  Continue to monitor her symptoms follow-up with her primary care provider if symptoms persist further evaluation as needed  5/10/2019   [facility]     Jimbo Pinto PA-C  05/10/19 9741

## 2019-05-11 NOTE — ED TRIAGE NOTES
"Patient reports sore throat that began yesterday, she reports subjective fever that began this afternoon. Patient reports \"it feels like I have strep throat\"   "

## 2019-06-13 ENCOUNTER — HOSPITAL ENCOUNTER (OUTPATIENT)
Dept: MRI IMAGING | Facility: OTHER | Age: 44
Discharge: HOME | End: 2019-06-13
Attending: CHIROPRACTOR | Admitting: CHIROPRACTOR
Payer: COMMERCIAL

## 2019-06-13 DIAGNOSIS — M79.602 LEFT ARM PAIN: ICD-10-CM

## 2019-06-13 DIAGNOSIS — M54.2 NECK PAIN ON LEFT SIDE: ICD-10-CM

## 2019-06-13 PROCEDURE — 72141 MRI NECK SPINE W/O DYE: CPT

## 2019-06-18 ENCOUNTER — TELEPHONE (OUTPATIENT)
Dept: FAMILY MEDICINE | Facility: OTHER | Age: 44
End: 2019-06-18

## 2019-06-18 DIAGNOSIS — M54.2 CERVICALGIA: Primary | ICD-10-CM

## 2019-06-18 RX ORDER — ALPRAZOLAM 0.5 MG
TABLET ORAL
Qty: 2 TABLET | Refills: 0 | Status: SHIPPED | OUTPATIENT
Start: 2019-06-18 | End: 2019-07-22

## 2019-06-18 NOTE — TELEPHONE ENCOUNTER
Patient is going to be having injection in her neck and is feeling anxious. She is requesting medication to help her relax for procedure. Appointment isn't scheduled yet, they are working with insurance to get it authorized.  Pharmacy and allergies have been reviewed.    Jessica Rodriguez LPN on 6/18/2019 at 2:25 PM

## 2019-06-22 ENCOUNTER — HOSPITAL ENCOUNTER (EMERGENCY)
Facility: OTHER | Age: 44
Discharge: HOME | End: 2019-06-22
Attending: PHYSICIAN ASSISTANT | Admitting: PHYSICIAN ASSISTANT
Payer: COMMERCIAL

## 2019-06-22 VITALS
SYSTOLIC BLOOD PRESSURE: 198 MMHG | RESPIRATION RATE: 16 BRPM | HEART RATE: 69 BPM | TEMPERATURE: 98 F | OXYGEN SATURATION: 100 % | DIASTOLIC BLOOD PRESSURE: 125 MMHG

## 2019-06-22 DIAGNOSIS — M54.2 NECK PAIN: ICD-10-CM

## 2019-06-22 PROCEDURE — 25000132 ZZH RX MED GY IP 250 OP 250 PS 637: Performed by: PHYSICIAN ASSISTANT

## 2019-06-22 PROCEDURE — 99283 EMERGENCY DEPT VISIT LOW MDM: CPT | Performed by: PHYSICIAN ASSISTANT

## 2019-06-22 PROCEDURE — 99283 EMERGENCY DEPT VISIT LOW MDM: CPT | Mod: Z6 | Performed by: PHYSICIAN ASSISTANT

## 2019-06-22 PROCEDURE — 25000131 ZZH RX MED GY IP 250 OP 636 PS 637: Performed by: PHYSICIAN ASSISTANT

## 2019-06-22 RX ORDER — OXYCODONE HYDROCHLORIDE 5 MG/1
5 TABLET ORAL ONCE
Status: COMPLETED | OUTPATIENT
Start: 2019-06-22 | End: 2019-06-22

## 2019-06-22 RX ORDER — PREDNISONE 20 MG/1
TABLET ORAL
Qty: 10 TABLET | Refills: 0 | Status: SHIPPED | OUTPATIENT
Start: 2019-06-22 | End: 2019-07-01

## 2019-06-22 RX ORDER — OXYCODONE HYDROCHLORIDE 5 MG/1
5 TABLET ORAL EVERY 6 HOURS PRN
Qty: 12 TABLET | Refills: 0 | Status: SHIPPED | OUTPATIENT
Start: 2019-06-22 | End: 2019-07-01

## 2019-06-22 RX ORDER — PREDNISONE 20 MG/1
40 TABLET ORAL ONCE
Status: COMPLETED | OUTPATIENT
Start: 2019-06-22 | End: 2019-06-22

## 2019-06-22 RX ADMIN — PREDNISONE 40 MG: 20 TABLET ORAL at 13:06

## 2019-06-22 RX ADMIN — OXYCODONE HYDROCHLORIDE 5 MG: 5 TABLET ORAL at 13:06

## 2019-06-22 ASSESSMENT — ENCOUNTER SYMPTOMS
NECK PAIN: 1
SHORTNESS OF BREATH: 0
ABDOMINAL PAIN: 0
VOMITING: 0
NAUSEA: 0
DIZZINESS: 0
FEVER: 0
NUMBNESS: 0

## 2019-06-22 NOTE — ED AVS SNAPSHOT
[facility]  [address]  [place] [address]  Phone:  [phone]  Fax:  [phone]                                    Dina Barraza   MRN: [id number]    Department:  [facility]   Date of Visit:  6/22/2019           After Visit Summary Signature Page    I have received my discharge instructions, and my questions have been answered. I have discussed any challenges I see with this plan with the nurse or doctor.    ..........................................................................................................................................  Patient/Patient Representative Signature      ..........................................................................................................................................  Patient Representative Print Name and Relationship to Patient    ..................................................               ................................................  Date                                   Time    ..........................................................................................................................................  Reviewed by Signature/Title    ...................................................              ..............................................  Date                                               Time          22EPIC Rev 08/18

## 2019-06-22 NOTE — ED PROVIDER NOTES
History     Chief Complaint   Patient presents with     Neck Pain     HPI  Dina Barraza is a 44 year old female who presents to the ED today with chief complaint of neck pain.  Patient has had this neck pain for the last 2 months, with a confirmed C6 radiculopathy 9 days prior by MRI.  Patient usually sees her chiropractor twice a week for manipulations which help with the discomfort.  However, her chiropractor is out of town and she was unable to get another manipulation as usual.  She has tried Tylenol and ibuprofen as well as ice and heat.  She reports extreme pain with inability to sleep.  She denies any new neurological symptoms, fevers.  Patient has no other complaints.    Allergies:  Allergies   Allergen Reactions     Ranitidine Hives     Sulfa Drugs Rash     Sulfamethoxazole-Trimethoprim Rash       Problem List:    Patient Active Problem List    Diagnosis Date Noted     Morbid obesity (H) 01/29/2019     Priority: Medium     Anxiety state 01/24/2018     Priority: Medium     Gastroesophageal reflux disease 01/24/2018     Priority: Medium     Obesity 01/24/2018     Priority: Medium     HTN (hypertension) 06/03/2016     Priority: Medium     Mild intermittent asthma without complication 02/21/2013     Priority: Medium     Overview:   Illness-induced          Past Medical History:    Past Medical History:   Diagnosis Date     Major depressive disorder, single episode      Mild intermittent asthma, uncomplicated        Past Surgical History:    Past Surgical History:   Procedure Laterality Date     LAPAROSCOPIC CHOLECYSTECTOMY      08/09/02     LAPAROSCOPIC TUBAL LIGATION      2005       Family History:    Family History   Problem Relation Age of Onset     Diabetes Mother         Diabetes     Hypertension Mother         Hypertension     Diabetes Father         Diabetes,type 2     Heart Disease Father         Heart Disease, MI, CVA and CABG     Heart Disease Paternal Grandfather         Heart Disease,MI age  60,      Hypertension Maternal Grandmother         Hypertension     Diabetes Maternal Aunt         Diabetes,type 2     Diabetes Maternal Uncle         Diabetes,type 2     Family History Negative Son         Good Health     Family History Negative Son         Good Health,Asthma     Family History Negative Daughter         Good Health,Asthma       Social History:  Marital Status:   [2]  Social History     Tobacco Use     Smoking status: Former Smoker     Packs/day: 0.50     Years: 12.00     Pack years: 6.00     Types: Cigarettes     Last attempt to quit: 2018     Years since quittin.4     Smokeless tobacco: Never Used   Substance Use Topics     Alcohol use: Yes     Comment: Alcoholic Drinks/day: rare     Drug use: Unknown     Types: Other     Comment: Drug use: No        Medications:      HYDROcodone-acetaminophen (NORCO) 5-325 MG tablet   oxyCODONE (ROXICODONE) 5 MG tablet   predniSONE (DELTASONE) 20 MG tablet   sertraline (ZOLOFT) 50 MG tablet   albuterol (PROAIR HFA/PROVENTIL HFA/VENTOLIN HFA) 108 (90 BASE) MCG/ACT Inhaler   ALPRAZolam (XANAX) 0.5 MG tablet   beclomethasone HFA (QVAR REDIHALER) 40 MCG/ACT inhaler   busPIRone (BUSPAR) 10 MG tablet   lidocaine VISCOUS (XYLOCAINE) 2 % solution         Review of Systems   Constitutional: Negative for fever.   Respiratory: Negative for shortness of breath.    Cardiovascular: Negative for chest pain.   Gastrointestinal: Negative for abdominal pain, nausea and vomiting.   Musculoskeletal: Positive for neck pain.   Neurological: Negative for dizziness and numbness.       Physical Exam   BP: (!) 229/126  Pulse: 82  Temp: 98  F (36.7  C)  Resp: 16  SpO2: 94 %      Physical Exam   Constitutional: She is oriented to person, place, and time. No distress.   HENT:   Head: Normocephalic and atraumatic.   Eyes: Pupils are equal, round, and reactive to light. Conjunctivae and EOM are normal. No scleral icterus.   Neck: Neck supple.   Tenderness to palpation  occipital and neck regions   Cardiovascular: Normal rate and regular rhythm.   Pulmonary/Chest: Effort normal and breath sounds normal.   Abdominal: Soft. There is no tenderness.   Musculoskeletal: Normal range of motion. She exhibits no deformity.   Lymphadenopathy:     She has no cervical adenopathy.   Neurological: She is alert and oriented to person, place, and time. No cranial nerve deficit. Coordination normal.   Skin: Skin is warm and dry. No rash noted. She is not diaphoretic.   Psychiatric: She has a normal mood and affect. Her behavior is normal. Judgment and thought content normal.       ED Course        Procedures               Critical Care time:  none               No results found for this or any previous visit (from the past 24 hour(s)).    Medications   predniSONE (DELTASONE) tablet 40 mg (40 mg Oral Given 6/22/19 1306)   oxyCODONE (ROXICODONE) tablet 5 mg (5 mg Oral Given 6/22/19 1306)       Assessments & Plan (with Medical Decision Making)   Patient nontoxic in slight distress due to discomfort.  Patient neurologically intact, denies dizziness or weakness of extremities.  She says this is her chronic pain that is exacerbated due to lack of recent manipulation by chiropractor.  Patient had an MRI approximately 9 days prior which showed a C6 radiculopathy.  She declines any further imaging today.  We will treat with prednisone and a short course of oxycodone.  She is to follow-up early next week with chiropractor, she already has a scheduled steroid shot.  She is to return to the ED if there are any worsening or concerning symptoms, she understands and agrees with plan patient was discharged.    Noam Gutierrez PA-C        6/23/2019, 1615: Patient did return to the emergency room check in desk and reported that she felt flushed shortly after taking her prescribed oxycodone and she was wondering if she could have a different prescription.  She did have the pills with her and she freely disposed  of them and to the toilet and flush them this was witnessed by myself and Sussy Madison, LASHELL.  I did see her in the past patient was prescribed Norco and she was given another short course of that prescription to see if it would help with her neck pain.    Noam Gutierrez PA-C      I have reviewed the nursing notes.    I have reviewed the findings, diagnosis, plan and need for follow up with the patient.          Medication List      Started    HYDROcodone-acetaminophen 5-325 MG tablet  Commonly known as:  NORCO  1 tablet, Oral, EVERY 6 HOURS PRN     oxyCODONE 5 MG tablet  Commonly known as:  ROXICODONE  5 mg, Oral, EVERY 6 HOURS PRN            Final diagnoses:   Neck pain       6/22/2019   [facility]     Noam Gutierrez PA  06/22/19 1314       Noam Gutierrez PA  06/23/19 2105

## 2019-06-22 NOTE — ED TRIAGE NOTES
Pt has had pain in her neck for 2 months.  Has been seeing a chiropractor for the pain.  States she has a bulged disc at C6, and the pain has been increasingly worse as her chiro is out of town.  Pt states she has been using Tylenol and Ibuprofen for pain as well as ice without relief.

## 2019-06-22 NOTE — DISCHARGE INSTRUCTIONS
Get plenty of fluids and rest.  You can try Tylenol ibuprofen, and also use your prescribed prednisone and pain medication as needed.  Follow-up with your chiropractor as well as your already scheduled steroid shot.  Return to the ED if you have worsening or concerning symptoms.

## 2019-06-23 RX ORDER — HYDROCODONE BITARTRATE AND ACETAMINOPHEN 5; 325 MG/1; MG/1
1 TABLET ORAL EVERY 6 HOURS PRN
Qty: 12 TABLET | Refills: 0 | Status: SHIPPED | OUTPATIENT
Start: 2019-06-23 | End: 2019-07-01

## 2019-06-25 ENCOUNTER — APPOINTMENT (OUTPATIENT)
Dept: GENERAL RADIOLOGY | Facility: OTHER | Age: 44
End: 2019-06-25
Attending: FAMILY MEDICINE
Payer: COMMERCIAL

## 2019-06-25 ENCOUNTER — TELEPHONE (OUTPATIENT)
Dept: FAMILY MEDICINE | Facility: OTHER | Age: 44
End: 2019-06-25

## 2019-06-25 ENCOUNTER — HOSPITAL ENCOUNTER (EMERGENCY)
Facility: OTHER | Age: 44
Discharge: HOME | End: 2019-06-25
Attending: FAMILY MEDICINE | Admitting: FAMILY MEDICINE
Payer: COMMERCIAL

## 2019-06-25 VITALS
HEART RATE: 81 BPM | OXYGEN SATURATION: 98 % | SYSTOLIC BLOOD PRESSURE: 175 MMHG | WEIGHT: 260 LBS | DIASTOLIC BLOOD PRESSURE: 100 MMHG | TEMPERATURE: 98.6 F | BODY MASS INDEX: 43.27 KG/M2 | RESPIRATION RATE: 18 BRPM

## 2019-06-25 DIAGNOSIS — I10 SEVERE HYPERTENSION: ICD-10-CM

## 2019-06-25 DIAGNOSIS — M50.122 CERVICAL DISC DISORDER AT C5-C6 LEVEL WITH RADICULOPATHY: ICD-10-CM

## 2019-06-25 DIAGNOSIS — M54.2 NECK PAIN ON LEFT SIDE: ICD-10-CM

## 2019-06-25 DIAGNOSIS — R07.89 ATYPICAL CHEST PAIN: ICD-10-CM

## 2019-06-25 LAB
ALBUMIN SERPL-MCNC: 3.9 G/DL (ref 3.5–5.7)
ALBUMIN UR-MCNC: NEGATIVE MG/DL
ALP SERPL-CCNC: 51 U/L (ref 34–104)
ALT SERPL W P-5'-P-CCNC: 14 U/L (ref 7–52)
ANION GAP SERPL CALCULATED.3IONS-SCNC: 11 MMOL/L (ref 3–14)
APPEARANCE UR: CLEAR
AST SERPL W P-5'-P-CCNC: 12 U/L (ref 13–39)
BASOPHILS # BLD AUTO: 0.1 10E9/L (ref 0–0.2)
BASOPHILS NFR BLD AUTO: 0.7 %
BILIRUB SERPL-MCNC: 0.5 MG/DL (ref 0.3–1)
BILIRUB UR QL STRIP: NEGATIVE
BUN SERPL-MCNC: 10 MG/DL (ref 7–25)
CALCIUM SERPL-MCNC: 8.9 MG/DL (ref 8.6–10.3)
CHLORIDE SERPL-SCNC: 102 MMOL/L (ref 98–107)
CO2 SERPL-SCNC: 26 MMOL/L (ref 21–31)
COLOR UR AUTO: YELLOW
CREAT SERPL-MCNC: 0.6 MG/DL (ref 0.6–1.2)
D DIMER PPP DDU-MCNC: <200 NG/ML D-DU (ref 0–230)
DIFFERENTIAL METHOD BLD: NORMAL
EOSINOPHIL # BLD AUTO: 0.1 10E9/L (ref 0–0.7)
EOSINOPHIL NFR BLD AUTO: 0.9 %
ERYTHROCYTE [DISTWIDTH] IN BLOOD BY AUTOMATED COUNT: 13.7 % (ref 10–15)
GFR SERPL CREATININE-BSD FRML MDRD: >90 ML/MIN/{1.73_M2}
GLUCOSE SERPL-MCNC: 102 MG/DL (ref 70–105)
GLUCOSE UR STRIP-MCNC: NEGATIVE MG/DL
HCT VFR BLD AUTO: 37.9 % (ref 35–47)
HGB BLD-MCNC: 13 G/DL (ref 11.7–15.7)
HGB UR QL STRIP: NEGATIVE
IMM GRANULOCYTES # BLD: 0 10E9/L (ref 0–0.4)
IMM GRANULOCYTES NFR BLD: 0.4 %
INR PPP: 1 (ref 0–1.3)
KETONES UR STRIP-MCNC: NEGATIVE MG/DL
LEUKOCYTE ESTERASE UR QL STRIP: NEGATIVE
LIPASE SERPL-CCNC: <3 U/L (ref 11–82)
LYMPHOCYTES # BLD AUTO: 2.4 10E9/L (ref 0.8–5.3)
LYMPHOCYTES NFR BLD AUTO: 27.4 %
MCH RBC QN AUTO: 29.5 PG (ref 26.5–33)
MCHC RBC AUTO-ENTMCNC: 34.3 G/DL (ref 31.5–36.5)
MCV RBC AUTO: 86 FL (ref 78–100)
MONOCYTES # BLD AUTO: 0.6 10E9/L (ref 0–1.3)
MONOCYTES NFR BLD AUTO: 6.7 %
NEUTROPHILS # BLD AUTO: 5.7 10E9/L (ref 1.6–8.3)
NEUTROPHILS NFR BLD AUTO: 63.9 %
NITRATE UR QL: NEGATIVE
PH UR STRIP: 6.5 PH (ref 5–9)
PLATELET # BLD AUTO: 253 10E9/L (ref 150–450)
POTASSIUM SERPL-SCNC: 3 MMOL/L (ref 3.5–5.1)
PROT SERPL-MCNC: 6.5 G/DL (ref 6.4–8.9)
RBC # BLD AUTO: 4.41 10E12/L (ref 3.8–5.2)
SODIUM SERPL-SCNC: 139 MMOL/L (ref 134–144)
SOURCE: NORMAL
SP GR UR STRIP: <1.005 (ref 1–1.03)
TROPONIN I SERPL-MCNC: <0.03 UG/L (ref 0–0.03)
UROBILINOGEN UR STRIP-ACNC: 0.2 EU/DL (ref 0.2–1)
WBC # BLD AUTO: 8.9 10E9/L (ref 4–11)

## 2019-06-25 PROCEDURE — 81003 URINALYSIS AUTO W/O SCOPE: CPT | Performed by: FAMILY MEDICINE

## 2019-06-25 PROCEDURE — 99285 EMERGENCY DEPT VISIT HI MDM: CPT | Mod: 25 | Performed by: FAMILY MEDICINE

## 2019-06-25 PROCEDURE — 25000132 ZZH RX MED GY IP 250 OP 250 PS 637: Performed by: FAMILY MEDICINE

## 2019-06-25 PROCEDURE — 85025 COMPLETE CBC W/AUTO DIFF WBC: CPT | Performed by: FAMILY MEDICINE

## 2019-06-25 PROCEDURE — 85610 PROTHROMBIN TIME: CPT | Performed by: FAMILY MEDICINE

## 2019-06-25 PROCEDURE — 71046 X-RAY EXAM CHEST 2 VIEWS: CPT

## 2019-06-25 PROCEDURE — 83690 ASSAY OF LIPASE: CPT | Performed by: FAMILY MEDICINE

## 2019-06-25 PROCEDURE — 93010 ELECTROCARDIOGRAM REPORT: CPT | Performed by: INTERNAL MEDICINE

## 2019-06-25 PROCEDURE — 36415 COLL VENOUS BLD VENIPUNCTURE: CPT | Performed by: FAMILY MEDICINE

## 2019-06-25 PROCEDURE — 93005 ELECTROCARDIOGRAM TRACING: CPT | Performed by: FAMILY MEDICINE

## 2019-06-25 PROCEDURE — 80053 COMPREHEN METABOLIC PANEL: CPT | Performed by: FAMILY MEDICINE

## 2019-06-25 PROCEDURE — 99283 EMERGENCY DEPT VISIT LOW MDM: CPT | Mod: Z6 | Performed by: FAMILY MEDICINE

## 2019-06-25 PROCEDURE — 84484 ASSAY OF TROPONIN QUANT: CPT | Performed by: FAMILY MEDICINE

## 2019-06-25 PROCEDURE — 85379 FIBRIN DEGRADATION QUANT: CPT | Performed by: FAMILY MEDICINE

## 2019-06-25 RX ORDER — LORAZEPAM 1 MG/1
1 TABLET ORAL ONCE
Status: COMPLETED | OUTPATIENT
Start: 2019-06-25 | End: 2019-06-25

## 2019-06-25 RX ORDER — LORAZEPAM 0.5 MG/1
.5-1 TABLET ORAL EVERY 8 HOURS PRN
Qty: 15 TABLET | Refills: 0 | Status: SHIPPED | OUTPATIENT
Start: 2019-06-25 | End: 2020-03-11

## 2019-06-25 RX ORDER — POTASSIUM CHLORIDE 1500 MG/1
40 TABLET, EXTENDED RELEASE ORAL ONCE
Status: COMPLETED | OUTPATIENT
Start: 2019-06-25 | End: 2019-06-25

## 2019-06-25 RX ORDER — ASPIRIN 81 MG/1
324 TABLET, CHEWABLE ORAL ONCE
Status: COMPLETED | OUTPATIENT
Start: 2019-06-25 | End: 2019-06-25

## 2019-06-25 RX ADMIN — ASPIRIN 81 MG 324 MG: 81 TABLET ORAL at 20:03

## 2019-06-25 RX ADMIN — POTASSIUM CHLORIDE 40 MEQ: 1500 TABLET, EXTENDED RELEASE ORAL at 20:29

## 2019-06-25 RX ADMIN — LORAZEPAM 1 MG: 1 TABLET ORAL at 21:28

## 2019-06-25 ASSESSMENT — ENCOUNTER SYMPTOMS
CHEST TIGHTNESS: 1
NERVOUS/ANXIOUS: 1
EYES NEGATIVE: 1
DIAPHORESIS: 1
PALPITATIONS: 0
SHORTNESS OF BREATH: 1
NUMBNESS: 1
ACTIVITY CHANGE: 1
ABDOMINAL PAIN: 0
NECK PAIN: 1
FEVER: 0
NECK STIFFNESS: 1

## 2019-06-25 NOTE — TELEPHONE ENCOUNTER
Addendum  created 05/22/17 0044 by Tara Simpson MD    Sign clinical note       Pt states that she was seen in the ER due to having a bulged disc in her back, she said that they scheduled an appointment for a shot but her pain medication has ran out and she is wanting to know if she needs to come in and be seen or if she can have a Rx sent to the pharmacy since the appointment is already scheduled.

## 2019-06-25 NOTE — TELEPHONE ENCOUNTER
Options over the phone include benadryl up to 100mg (4 tabs) at night r melatonin 10mg at night.  Both should be taken up to 1 hour prior to wanting to go to sleep.  Anything else requires an appt.

## 2019-06-25 NOTE — TELEPHONE ENCOUNTER
Patient is scheduled for injection on 07/03/19. Patient reports having trouble sleeping due to the pain and is requesting something.    Jessica Rodriguez LPN on 6/25/2019 at 3:35 PM

## 2019-06-25 NOTE — ED AVS SNAPSHOT
[facility]  [address]  [place] [address]  Phone:  [phone]  Fax:  [phone]                                    Dina Barraza   MRN: [id number]    Department:  [facility]   Date of Visit:  6/25/2019           After Visit Summary Signature Page    I have received my discharge instructions, and my questions have been answered. I have discussed any challenges I see with this plan with the nurse or doctor.    ..........................................................................................................................................  Patient/Patient Representative Signature      ..........................................................................................................................................  Patient Representative Print Name and Relationship to Patient    ..................................................               ................................................  Date                                   Time    ..........................................................................................................................................  Reviewed by Signature/Title    ...................................................              ..............................................  Date                                               Time          22EPIC Rev 08/18

## 2019-06-26 NOTE — ED PROVIDER NOTES
History     Chief Complaint   Patient presents with     Chest Pain     HPI  Dina Barraza is a 44 year old RHD female who presents to the emergency department with acute chest tightness in substernal region and shortness of breath with activity specifically climbing stairs about an hour prior to arrival.  She was feeling sweaty with this and nauseous.  Her symptoms have since resolved.  She is quite anxious about this.  She has been having problems with left-sided neck and shoulder pains that radiate into her wrist and a little bit into her left thumb with some numbness there.  She had recent MRI study that showed some diffuse wear and tear and possible C6 leftward acute disc herniation with very mild protrusion.  She is very anxious about this revelation.  She has kinesiology tape on her left shoulder and neck posteriorly.  She is supposed to be seeing her chiropractor for this.  Her father had diabetes and was a chronic smoker with quadruple bypass in his 50s.  Patient has no known family history of blood clots.  She did not have any gestational diabetes.    Allergies:  Allergies   Allergen Reactions     Ranitidine Hives     Oxycodone Rash     Sulfa Drugs Rash     Sulfamethoxazole-Trimethoprim Rash       Problem List:    Patient Active Problem List    Diagnosis Date Noted     Morbid obesity (H) 01/29/2019     Priority: Medium     Anxiety state 01/24/2018     Priority: Medium     Gastroesophageal reflux disease 01/24/2018     Priority: Medium     Obesity 01/24/2018     Priority: Medium     HTN (hypertension) 06/03/2016     Priority: Medium     Mild intermittent asthma without complication 02/21/2013     Priority: Medium     Overview:   Illness-induced          Past Medical History:    Past Medical History:   Diagnosis Date     Major depressive disorder, single episode      Mild intermittent asthma, uncomplicated        Past Surgical History:    Past Surgical History:   Procedure Laterality Date      LAPAROSCOPIC CHOLECYSTECTOMY      02     LAPAROSCOPIC TUBAL LIGATION             Family History:    Family History   Problem Relation Age of Onset     Diabetes Mother         Diabetes     Hypertension Mother         Hypertension     Diabetes Father         Diabetes,type 2     Heart Disease Father         Heart Disease, MI, CVA and CABG     Heart Disease Paternal Grandfather         Heart Disease,MI age 60,      Hypertension Maternal Grandmother         Hypertension     Diabetes Maternal Aunt         Diabetes,type 2     Diabetes Maternal Uncle         Diabetes,type 2     Family History Negative Son         Good Health     Family History Negative Son         Good Health,Asthma     Family History Negative Daughter         Good Health,Asthma       Social History:  Marital Status:   [2]  Social History     Tobacco Use     Smoking status: Former Smoker     Packs/day: 0.50     Years: 12.00     Pack years: 6.00     Types: Cigarettes     Last attempt to quit: 2018     Years since quittin.4     Smokeless tobacco: Never Used   Substance Use Topics     Alcohol use: Yes     Comment: Alcoholic Drinks/day: rare     Drug use: Unknown     Types: Other     Comment: Drug use: No        Medications:      LORazepam (ATIVAN) 0.5 MG tablet   albuterol (PROAIR HFA/PROVENTIL HFA/VENTOLIN HFA) 108 (90 BASE) MCG/ACT Inhaler   ALPRAZolam (XANAX) 0.5 MG tablet   beclomethasone HFA (QVAR REDIHALER) 40 MCG/ACT inhaler   busPIRone (BUSPAR) 10 MG tablet   HYDROcodone-acetaminophen (NORCO) 5-325 MG tablet   lidocaine VISCOUS (XYLOCAINE) 2 % solution   oxyCODONE (ROXICODONE) 5 MG tablet   predniSONE (DELTASONE) 20 MG tablet   sertraline (ZOLOFT) 50 MG tablet         Review of Systems   Constitutional: Positive for activity change and diaphoresis. Negative for fever.   HENT: Negative.    Eyes: Negative.    Respiratory: Positive for chest tightness and shortness of breath.    Cardiovascular: Positive for chest pain.  Negative for palpitations and leg swelling.   Gastrointestinal: Negative for abdominal pain.   Genitourinary: Negative.    Musculoskeletal: Positive for neck pain and neck stiffness.   Neurological: Positive for numbness (Occasionally into the left thumb).   Psychiatric/Behavioral: The patient is nervous/anxious (Patient reports an exacerbation of her anxiety over her concerns about her neck.).        Physical Exam   BP: (!) 170/106  Pulse: 75  Heart Rate: 82  Temp: 98.9  F (37.2  C)  Resp: 18  Weight: 117.9 kg (260 lb)  SpO2: 98 %      Physical Exam   Constitutional: She appears well-developed. She appears distressed.   Anxious 44-year-old female with obesity, weight 118 kg.  She has hypertension with normal heart rate, borderline tachypnea and normal oxygen saturations.  No fever.  She is reporting that her chest pain symptoms resolved prior to arrival.   HENT:   Head: Normocephalic and atraumatic.   Right Ear: External ear normal.   Left Ear: External ear normal.   Nose: Nose normal.   Mouth/Throat: Oropharynx is clear and moist.   Eyes: Pupils are equal, round, and reactive to light. Conjunctivae and EOM are normal. No scleral icterus.   Neck: Normal range of motion. Neck supple. No tracheal deviation present. No thyromegaly present.   Tenderness in the left anterior paraspinous muscles and these trapezius muscle specifically in the region of the upper thoracic insertion.   Cardiovascular: Normal rate, regular rhythm, normal heart sounds and intact distal pulses.   Pulmonary/Chest: Effort normal and breath sounds normal. No respiratory distress. She exhibits tenderness (Mild sternal chest tenderness but not reproducing symptoms of brought her in.).   Abdominal: Soft. Bowel sounds are normal. She exhibits no mass. There is tenderness (Some midepigastric tenderness but not reproducing symptoms of brought her in.  No right or left upper quadrant tenderness). There is no guarding.   Musculoskeletal: Normal range of  motion. She exhibits edema (1+ pitting dependent edema in both lower legs.).   Lymphadenopathy:     She has no cervical adenopathy.   Neurological: She is alert. No cranial nerve deficit. She exhibits normal muscle tone. Coordination normal.   Symmetric facial expressions tongue is midline motor is 5 out of 5 and symmetric in upper and lower extremities tone is symmetric bilaterally.   Skin: Skin is warm and dry. No rash noted. She is not diaphoretic.   Psychiatric:   Anxious.   Nursing note and vitals reviewed.            Study Result     MR CERVICAL SPINE W/O CONTRAST     HISTORY: Left arm pain; Neck pain on left side.     TECHNIQUE: Sagittal T1, T2 and STIR as well as axial T2 gradient and  3-D T2 images of the cervical spine were obtained.     COMPARISON: None.     FINDINGS:       There is straightening of the normal cervical lordosis. The vertebral  body heights are maintained. Scattered mild disc height loss is  present. No Modic edema or suspicious marrow lesion is identified.     The cervical cord has a normal caliber.  There are no areas of  abnormal cord signal.  No masses or fluid collections are seen in the  spinal canal or paravertebral soft tissues.  The craniocervical  junction is unremarkable.     C2-3: Mild uncovertebral and facet joint degenerative changes.  No  spinal stenosis. Minimal left neural foraminal stenosis.     C3-4: Mild uncovertebral and facet joint degenerative changes.  No  spinal stenosis. No neural foraminal stenosis.     C4-5: Minimal posterior disc osteophyte complex. Mild uncovertebral  and facet joint degenerative changes. Mild spinal stenosis. Moderate  left neural foraminal stenosis.     C5-6: Mild posterior disc osteophyte complex with a shallow  superimposed left foraminal protrusion best seen on sagittal image 11  and axial image 17. Moderate uncovertebral and facet joint  degenerative changes. Mild spinal stenosis. Severe left and mild right  neural foraminal  stenosis.     C6-7: Mild uncovertebral and facet joint degenerative changes. Minimal  spinal stenosis. Moderate left and minimal right neural foraminal  stenosis.     C7-T1: Mild uncovertebral and facet joint degenerative changes.  No  spinal stenosis. No neural foraminal stenosis.                                                                      IMPRESSION:     Asymmetric left-sided facet and uncovertebral degenerative changes  particularly at C4-5, C5-6 and C6-7.      At C5-6, a shallow left foraminal disc protrusion is suggested, which  could account for acute radiculopathy. Correlate for a left C6  radiculopathy.     CLEOPATRA LARSON MD       ED Course        Procedures          EKG: NSR at 79 bpm and normal.    Critical Care time:  none               Results for orders placed or performed during the hospital encounter of 06/25/19 (from the past 24 hour(s))   CBC with platelets differential   Result Value Ref Range    WBC 8.9 4.0 - 11.0 10e9/L    RBC Count 4.41 3.8 - 5.2 10e12/L    Hemoglobin 13.0 11.7 - 15.7 g/dL    Hematocrit 37.9 35.0 - 47.0 %    MCV 86 78 - 100 fl    MCH 29.5 26.5 - 33.0 pg    MCHC 34.3 31.5 - 36.5 g/dL    RDW 13.7 10.0 - 15.0 %    Platelet Count 253 150 - 450 10e9/L    Diff Method Automated Method     % Neutrophils 63.9 %    % Lymphocytes 27.4 %    % Monocytes 6.7 %    % Eosinophils 0.9 %    % Basophils 0.7 %    % Immature Granulocytes 0.4 %    Absolute Neutrophil 5.7 1.6 - 8.3 10e9/L    Absolute Lymphocytes 2.4 0.8 - 5.3 10e9/L    Absolute Monocytes 0.6 0.0 - 1.3 10e9/L    Absolute Eosinophils 0.1 0.0 - 0.7 10e9/L    Absolute Basophils 0.1 0.0 - 0.2 10e9/L    Abs Immature Granulocytes 0.0 0 - 0.4 10e9/L   INR   Result Value Ref Range    INR 1.00 0 - 1.3   Comprehensive metabolic panel   Result Value Ref Range    Sodium 139 134 - 144 mmol/L    Potassium 3.0 (L) 3.5 - 5.1 mmol/L    Chloride 102 98 - 107 mmol/L    Carbon Dioxide 26 21 - 31 mmol/L    Anion Gap 11 3 - 14 mmol/L    Glucose  102 70 - 105 mg/dL    Urea Nitrogen 10 7 - 25 mg/dL    Creatinine 0.60 0.60 - 1.20 mg/dL    GFR Estimate >90 >60 mL/min/[1.73_m2]    GFR Estimate If Black >90 >60 mL/min/[1.73_m2]    Calcium 8.9 8.6 - 10.3 mg/dL    Bilirubin Total 0.5 0.3 - 1.0 mg/dL    Albumin 3.9 3.5 - 5.7 g/dL    Protein Total 6.5 6.4 - 8.9 g/dL    Alkaline Phosphatase 51 34 - 104 U/L    ALT 14 7 - 52 U/L    AST 12 (L) 13 - 39 U/L   Lipase   Result Value Ref Range    Lipase <3 (L) 11 - 82 U/L   Troponin I   Result Value Ref Range    Troponin I ES <0.030 0.000 - 0.034 ug/L   D-Dimer (HI,GH)   Result Value Ref Range    D-Dimer ng/mL <200 0 - 230 ng/ml D-DU   *UA reflex to Microscopic   Result Value Ref Range    Color Urine Yellow     Appearance Urine Clear     Glucose Urine Negative NEG^Negative mg/dL    Bilirubin Urine Negative NEG^Negative    Ketones Urine Negative NEG^Negative mg/dL    Specific Gravity Urine <1.005 1.000 - 1.030    Blood Urine Negative NEG^Negative    pH Urine 6.5 5.0 - 9.0 pH    Protein Albumin Urine Negative NEG^Negative mg/dL    Urobilinogen Urine 0.2 0.2 - 1.0 EU/dL    Nitrite Urine Negative NEG^Negative    Leukocyte Esterase Urine Negative NEG^Negative    Source Midstream Urine    XR Chest 2 Views    Narrative    PROCEDURE:  XR CHEST 2 VW    HISTORY:  cp.     COMPARISON:  None.    FINDINGS:   The cardiac silhouette is normal in size. The pulmonary vasculature is  normal.  The lungs are clear. No pleural effusion or pneumothorax.      Impression    IMPRESSION:  No acute cardiopulmonary disease.      LEOBARDO GOLDBERG MD       Medications   aspirin (ASA) chewable tablet 324 mg (324 mg Oral Given 6/25/19 2003)   potassium chloride ER (K-DUR/KLOR-CON M) CR tablet 40 mEq (40 mEq Oral Given 6/25/19 2029)   LORazepam (ATIVAN) tablet 1 mg (1 mg Oral Given 6/25/19 2128)     9:25 PM on discussion with patient and her  regarding her labs and chest x-ray and recent MRI scan.  At this point reviewed the patient has nonsurgical  neck pain with plan for outpatient cervical and steroid injection medically with longer-term goal of active treatment modalities to decrease pain and increase range of motion.  Right now patient is having severe hypertension she feels that is associated with her pain and requesting trial of a muscle relaxer.  She has been on hydrocodone but only has 2 pills left and discuss no mixing of these medications.  We will try Ativan 1 mg now and half to 1 mg 3 times daily PRN for severe pain and spasm.  Recommending follow-up in clinic for blood pressure check.      Assessments & Plan (with Medical Decision Making)   44 year old female with left side neck and shoulder pains and possible acute radiculopathy with left C6 mild disc herniation presenting with new chest pain and has work up showing no obvious heart injury or PE or pancreatitis or biliary disease to explain her sx.  Likely her CP is musculoskeletal and associated with her left shoulder pains and causing anxiety and severe htn.  We discussed options and narcotics have not been helpful and will try ativan as muscle relaxer and she understands this is a temporary medication.  I would like her to be seen in clinic for recheck of her blood pressure.  She is resistant to f/u until after her neck injection and wants to see her PMD who is booked currently.     Per AVS:  Dear Ms. Barraza,  It was nice to meet you.  As we talked, we will try ativan as a muscle relaxant to see if this helps decrease your pain/spasm and decrease your anxiety.  Use the least amount needed and try to use just at bed time.  No driving or machine use on this medication.    Hopefully the ativan will also help your BP but your hypertensive response to your pain is too high and I would like you to have clinic follow up to discuss treatment of your hypertension.    Continue to use 1-2 tylenol 4 times a day (max 4000mg/day) and 1-3 ibuprofen 4 times a day (max 2400mg/day) as needed for pain.    Try to  do gentle neck and shoulder rolls in both directions to see if this can improve your range of motion and decrease your pain.      Follow up for your neck injection as scheduled.    Dr. Des Conklin      I have reviewed the nursing notes.    I have reviewed the findings, diagnosis, plan and need for follow up with the patient.          Medication List      Started    LORazepam 0.5 MG tablet  Commonly known as:  ATIVAN  0.5-1 mg, Oral, EVERY 8 HOURS PRN            Final diagnoses:   Atypical chest pain   Neck pain on left side   Cervical disc disorder at C5-C6 level with radiculopathy   Severe hypertension       6/25/2019   [facility]     Jorge Conklin MD  06/25/19 2259

## 2019-06-26 NOTE — ED TRIAGE NOTES
Pt arrives to the ED via private car.  Pt states she is having chest pain that started 1 hour ago.  Pain is in her epigastric area and states that is is a clenching pain.  Pt states that she has been having neck and shoulder pain for the past few months.

## 2019-06-26 NOTE — ED NOTES
"Pt presents to the ED with c/o epigastric pain that started approx 1800. Pt also c/o left neck pain (posterior) that radiates down left arm to wrist x 2-3 mos. Pt states that \"she does not know if this is anxiety related\". Pt states that her chest pain is gone and denies any SOB. VSS other than pt's BP is elevated 162/109. Pt on stretcher MD at the bedside.  "

## 2019-06-26 NOTE — DISCHARGE INSTRUCTIONS
Dear Ms. Barraza,  It was nice to meet you.  As we talked, we will try ativan as a muscle relaxant to see if this helps decrease your pain/spasm and decrease your anxiety.  Use the least amount needed and try to use just at bed time.  No driving or machine use on this medication.    Hopefully the ativan will also help your BP but your hypertensive response to your pain is too high and I would like you to have clinic follow up to discuss treatment of your hypertension.    Continue to use 1-2 tylenol 4 times a day (max 4000mg/day) and 1-3 ibuprofen 4 times a day (max 2400mg/day) as needed for pain.    Try to do gentle neck and shoulder rolls in both directions to see if this can improve your range of motion and decrease your pain.      Follow up for your neck injection as scheduled.    Dr. Des Conklin

## 2019-07-01 ENCOUNTER — OFFICE VISIT (OUTPATIENT)
Dept: FAMILY MEDICINE | Facility: OTHER | Age: 44
End: 2019-07-01
Attending: FAMILY MEDICINE
Payer: COMMERCIAL

## 2019-07-01 VITALS
HEART RATE: 84 BPM | OXYGEN SATURATION: 98 % | RESPIRATION RATE: 16 BRPM | TEMPERATURE: 97.6 F | BODY MASS INDEX: 44.6 KG/M2 | DIASTOLIC BLOOD PRESSURE: 118 MMHG | WEIGHT: 268 LBS | SYSTOLIC BLOOD PRESSURE: 182 MMHG

## 2019-07-01 DIAGNOSIS — I10 ESSENTIAL HYPERTENSION: ICD-10-CM

## 2019-07-01 DIAGNOSIS — E87.6 HYPOKALEMIA: Primary | ICD-10-CM

## 2019-07-01 LAB — POTASSIUM SERPL-SCNC: 3.7 MMOL/L (ref 3.5–5.1)

## 2019-07-01 PROCEDURE — 99213 OFFICE O/P EST LOW 20 MIN: CPT | Performed by: FAMILY MEDICINE

## 2019-07-01 PROCEDURE — G0463 HOSPITAL OUTPT CLINIC VISIT: HCPCS

## 2019-07-01 PROCEDURE — 84132 ASSAY OF SERUM POTASSIUM: CPT | Mod: ZL | Performed by: FAMILY MEDICINE

## 2019-07-01 PROCEDURE — 36415 COLL VENOUS BLD VENIPUNCTURE: CPT | Mod: ZL | Performed by: FAMILY MEDICINE

## 2019-07-01 RX ORDER — LISINOPRIL/HYDROCHLOROTHIAZIDE 10-12.5 MG
1 TABLET ORAL DAILY
Qty: 30 TABLET | Refills: 5 | Status: SHIPPED | OUTPATIENT
Start: 2019-07-01 | End: 2020-05-05

## 2019-07-01 ASSESSMENT — ANXIETY QUESTIONNAIRES
7. FEELING AFRAID AS IF SOMETHING AWFUL MIGHT HAPPEN: MORE THAN HALF THE DAYS
3. WORRYING TOO MUCH ABOUT DIFFERENT THINGS: MORE THAN HALF THE DAYS
IF YOU CHECKED OFF ANY PROBLEMS ON THIS QUESTIONNAIRE, HOW DIFFICULT HAVE THESE PROBLEMS MADE IT FOR YOU TO DO YOUR WORK, TAKE CARE OF THINGS AT HOME, OR GET ALONG WITH OTHER PEOPLE: VERY DIFFICULT
2. NOT BEING ABLE TO STOP OR CONTROL WORRYING: MORE THAN HALF THE DAYS
6. BECOMING EASILY ANNOYED OR IRRITABLE: MORE THAN HALF THE DAYS
5. BEING SO RESTLESS THAT IT IS HARD TO SIT STILL: MORE THAN HALF THE DAYS
1. FEELING NERVOUS, ANXIOUS, OR ON EDGE: MORE THAN HALF THE DAYS
GAD7 TOTAL SCORE: 14

## 2019-07-01 ASSESSMENT — PATIENT HEALTH QUESTIONNAIRE - PHQ9: 5. POOR APPETITE OR OVEREATING: MORE THAN HALF THE DAYS

## 2019-07-01 NOTE — NURSING NOTE
Pt presents to clinic today for follow up blood pressure.      Medication Reconciliation: complete  Ellen Conklin LPN

## 2019-07-01 NOTE — LETTER
My Asthma Action Plan  Name: Dina Barraza   YOB: 1975  Date: 7/1/2019   My doctor: Ronen Olson MD   My clinic: [facility]        My Control Medicine: Beclomethasone (QVar) -  40 mcg Inhale 1 puff into the lungs 2 times daily  My Rescue Medicine: Albuterol (Proair/Ventolin/Proventil) inhaler Inhale 1-2 puffs into the lungs every 6 hours as needed   My Asthma Severity: mild persistent  Avoid your asthma triggers: smoke               GREEN ZONE   Good Control    I feel good    No cough or wheeze    Can work, sleep and play without asthma symptoms       Take your asthma control medicine every day.     1. If exercise triggers your asthma, take your rescue medication    15 minutes before exercise or sports, and    During exercise if you have asthma symptoms  2. Spacer to use with inhaler: If you have a spacer, make sure to use it with your inhaler             YELLOW ZONE Getting Worse  I have ANY of these:    I do not feel good    Cough or wheeze    Chest feels tight    Wake up at night   1. Keep taking your Green Zone medications  2. Start taking your rescue medicine:    every 20 minutes for up to 1 hour. Then every 4 hours for 24-48 hours.  3. If you stay in the Yellow Zone for more than 12-24 hours, contact your doctor.  4. If you do not return to the Green Zone in 12-24 hours or you get worse, start taking your oral steroid medicine if prescribed by your provider.           RED ZONE Medical Alert - Get Help  I have ANY of these:    I feel awful    Medicine is not helping    Breathing getting harder    Trouble walking or talking    Nose opens wide to breathe       1. Take your rescue medicine NOW  2. If your provider has prescribed an oral steroid medicine, start taking it NOW  3. Call your doctor NOW  4. If you are still in the Red Zone after 20 minutes and you have not reached your doctor:    Take your rescue medicine again and    Call 911 or go to the emergency room  right away    See your regular doctor within 2 weeks of an Emergency Room or Urgent Care visit for follow-up treatment.          Annual Reminders:  Meet with Asthma Educator,  Flu Shot in the Fall, consider Pneumonia Vaccination for patients with asthma (aged 19 and older).    Pharmacy: [id number] ([place]) - [place], [place] - [address]                      Asthma Triggers  How To Control Things That Make Your Asthma Worse    Triggers are things that make your asthma worse.  Look at the list below to help you find your triggers and what you can do about them.  You can help prevent asthma flare-ups by staying away from your triggers.      Trigger                                                          What you can do   Cigarette Smoke  Tobacco smoke can make asthma worse. Do not allow smoking in your home, car or around you.  Be sure no one smokes at a child s day care or school.  If you smoke, ask your health care provider for ways to help you quit.  Ask family members to quit too.  Ask your health care provider for a referral to Quit Plan to help you quit smoking, or call 3-350-697-PLAN.     Colds, Flu, Bronchitis  These are common triggers of asthma. Wash your hands often.  Don t touch your eyes, nose or mouth.  Get a flu shot every year.     Dust Mites  These are tiny bugs that live in cloth or carpet. They are too small to see. Wash sheets and blankets in hot water every week.   Encase pillows and mattress in dust mite proof covers.  Avoid having carpet if you can. If you have carpet, vacuum weekly.   Use a dust mask and HEPA vacuum.   Pollen and Outdoor Mold  Some people are allergic to trees, grass, or weed pollen, or molds. Try to keep your windows closed.  Limit time out doors when pollen count is high.   Ask you health care provider about taking medicine during allergy season.     Animal Dander  Some people are allergic to skin flakes, urine or saliva from pets with fur or  feathers. Keep pets with fur or feathers out of your home.    If you can t keep the pet outdoors, then keep the pet out of your bedroom.  Keep the bedroom door closed.  Keep pets off cloth furniture and away from stuffed toys.     Mice, Rats, and Cockroaches  Some people are allergic to the waste from these pests.   Cover food and garbage.  Clean up spills and food crumbs.  Store grease in the refrigerator.   Keep food out of the bedroom.   Indoor Mold  This can be a trigger if your home has high moisture. Fix leaking faucets, pipes, or other sources of water.   Clean moldy surfaces.  Dehumidify basement if it is damp and smelly.   Smoke, Strong Odors, and Sprays  These can reduce air quality. Stay away from strong odors and sprays, such as perfume, powder, hair spray, paints, smoke incense, paint, cleaning products, candles and new carpet.   Exercise or Sports  Some people with asthma have this trigger. Be active!  Ask your doctor about taking medicine before sports or exercise to prevent symptoms.    Warm up for 5-10 minutes before and after sports or exercise.     Other Triggers of Asthma  Cold air:  Cover your nose and mouth with a scarf.  Sometimes laughing or crying can be a trigger.  Some medicines and food can trigger asthma.

## 2019-07-01 NOTE — LETTER
July 3, 2019      Dina Barraza  [address] [facility] [address]        Dear ,    We are writing to inform you of your test results.    Your test results fall within the expected range(s) or remain unchanged from previous results.  Please continue with current treatment plan.    Resulted Orders   Potassium   Result Value Ref Range    Potassium 3.7 3.5 - 5.1 mmol/L       If you have any questions or concerns, please call the clinic at the number listed above.       Sincerely,        Ronen Olson MD

## 2019-07-02 ASSESSMENT — ASTHMA QUESTIONNAIRES: ACT_TOTALSCORE: 25

## 2019-07-02 ASSESSMENT — ANXIETY QUESTIONNAIRES: GAD7 TOTAL SCORE: 14

## 2019-07-03 ENCOUNTER — HOSPITAL ENCOUNTER (OUTPATIENT)
Dept: GENERAL RADIOLOGY | Facility: OTHER | Age: 44
End: 2019-07-03
Attending: CHIROPRACTOR
Payer: COMMERCIAL

## 2019-07-03 ENCOUNTER — HOSPITAL ENCOUNTER (OUTPATIENT)
Dept: GENERAL RADIOLOGY | Facility: OTHER | Age: 44
Discharge: HOME | End: 2019-07-03
Attending: CHIROPRACTOR | Admitting: CHIROPRACTOR
Payer: COMMERCIAL

## 2019-07-03 DIAGNOSIS — M54.2 CERVICAL PAIN: ICD-10-CM

## 2019-07-03 DIAGNOSIS — M54.10 RADICULAR PAIN: ICD-10-CM

## 2019-07-03 PROCEDURE — 62321 NJX INTERLAMINAR CRV/THRC: CPT

## 2019-07-03 PROCEDURE — 25500064 ZZH RX 255 OP 636: Performed by: RADIOLOGY

## 2019-07-03 PROCEDURE — 25000125 ZZHC RX 250: Performed by: RADIOLOGY

## 2019-07-03 PROCEDURE — 25000128 H RX IP 250 OP 636: Performed by: RADIOLOGY

## 2019-07-03 PROCEDURE — G0463 HOSPITAL OUTPT CLINIC VISIT: HCPCS

## 2019-07-03 RX ORDER — LIDOCAINE HYDROCHLORIDE 10 MG/ML
5 INJECTION, SOLUTION INFILTRATION; PERINEURAL ONCE
Status: COMPLETED | OUTPATIENT
Start: 2019-07-03 | End: 2019-07-03

## 2019-07-03 RX ORDER — METHYLPREDNISOLONE ACETATE 80 MG/ML
80 INJECTION, SUSPENSION INTRA-ARTICULAR; INTRALESIONAL; INTRAMUSCULAR; SOFT TISSUE ONCE
Status: COMPLETED | OUTPATIENT
Start: 2019-07-03 | End: 2019-07-03

## 2019-07-03 RX ADMIN — LIDOCAINE HYDROCHLORIDE 2 ML: 10 INJECTION, SOLUTION INFILTRATION; PERINEURAL at 09:12

## 2019-07-03 RX ADMIN — METHYLPREDNISOLONE ACETATE 80 MG: 80 INJECTION, SUSPENSION INTRA-ARTICULAR; INTRALESIONAL; INTRAMUSCULAR; SOFT TISSUE at 09:12

## 2019-07-03 RX ADMIN — IOHEXOL 2 ML: 240 INJECTION, SOLUTION INTRATHECAL; INTRAVASCULAR; INTRAVENOUS; ORAL at 09:11

## 2019-07-03 NOTE — PROGRESS NOTES
SUBJECTIVE:   Dina Barraza is a 44 year old female who presents to clinic today for the following health issues: Blood pressure follow-up hypokalemia follow-up    Patient was recently seen in the ER and was found to have an elevated blood pressure.  She denies any complaints.She arrives here for follow-up.  History of hypertension in the past.While in the ER she was seen for pain       All patient  Patient Active Problem List    Diagnosis Date Noted     Essential hypertension 07/01/2019     Priority: Medium     Morbid obesity (H) 01/29/2019     Priority: Medium     Anxiety state 01/24/2018     Priority: Medium     Gastroesophageal reflux disease 01/24/2018     Priority: Medium     Obesity 01/24/2018     Priority: Medium     Mild intermittent asthma without complication 02/21/2013     Priority: Medium     Overview:   Illness-induced       Past Medical History:   Diagnosis Date     Major depressive disorder, single episode     During an unhealthy past marriage     Mild intermittent asthma, uncomplicated     Mild intermittent asthma      Past Surgical History:   Procedure Laterality Date     LAPAROSCOPIC CHOLECYSTECTOMY      08/09/02     LAPAROSCOPIC TUBAL LIGATION      2005     Current Outpatient Medications   Medication Sig Dispense Refill     albuterol (PROAIR HFA/PROVENTIL HFA/VENTOLIN HFA) 108 (90 BASE) MCG/ACT Inhaler Inhale 1-2 puffs into the lungs every 6 hours as needed       beclomethasone HFA (QVAR REDIHALER) 40 MCG/ACT inhaler Inhale 1 puff into the lungs 2 times daily 1 Inhaler 11     busPIRone (BUSPAR) 10 MG tablet Take 1 tablet (10 mg) by mouth 2 times daily as needed 180 tablet 0     lisinopril-hydrochlorothiazide (PRINZIDE/ZESTORETIC) 10-12.5 MG tablet Take 1 tablet by mouth daily 30 tablet 5     LORazepam (ATIVAN) 0.5 MG tablet Take 1-2 tablets (0.5-1 mg) by mouth every 8 hours as needed for anxiety, muscle spasms or pain 15 tablet 0     sertraline (ZOLOFT) 50 MG tablet Take 1-2 tablets  ( mg) by mouth daily 60 tablet 1     ALPRAZolam (XANAX) 0.5 MG tablet 1 tab 1 hour prior to injection, 1 tab at the time of your injection (Patient not taking: Reported on 7/1/2019) 2 tablet 0     Allergies   Allergen Reactions     Ranitidine Hives     Oxycodone Rash     Sulfa Drugs Rash     Sulfamethoxazole-Trimethoprim Rash       Review of Systems     OBJECTIVE:     BP (!) 182/118   Pulse 84   Temp 97.6  F (36.4  C) (Tympanic)   Resp 16   Wt 121.6 kg (268 lb)   LMP 06/04/2019   SpO2 98%   BMI 44.60 kg/m    Body mass index is 44.6 kg/m .  Physical Exam   Constitutional: She appears well-developed and well-nourished.   HENT:   Head: Normocephalic.   Cardiovascular: Normal rate and regular rhythm.   Pulmonary/Chest: Effort normal.   Psychiatric: She has a normal mood and affect.       Diagnostic Test Results:  Results for orders placed or performed in visit on 07/01/19   Potassium   Result Value Ref Range    Potassium 3.7 3.5 - 5.1 mmol/L       ASSESSMENT/PLAN:         1. Essential hypertension  Start recommend blood pressure checks outpatient over the next month.  Goal is to get below 140/90.  - lisinopril-hydrochlorothiazide (PRINZIDE/ZESTORETIC) 10-12.5 MG tablet; Take 1 tablet by mouth daily  Dispense: 30 tablet; Refill: 5    2. Hypokalemia  Potassium is normalized.  - Potassium; Future  - Potassium; Future  - Potassium      Ronen Olson MD  [facility]

## 2019-07-22 ENCOUNTER — OFFICE VISIT (OUTPATIENT)
Dept: FAMILY MEDICINE | Facility: OTHER | Age: 44
End: 2019-07-22
Attending: FAMILY MEDICINE
Payer: COMMERCIAL

## 2019-07-22 VITALS
HEIGHT: 65 IN | DIASTOLIC BLOOD PRESSURE: 96 MMHG | SYSTOLIC BLOOD PRESSURE: 156 MMHG | TEMPERATURE: 98.4 F | RESPIRATION RATE: 16 BRPM | HEART RATE: 80 BPM | BODY MASS INDEX: 43.99 KG/M2 | WEIGHT: 264 LBS

## 2019-07-22 DIAGNOSIS — M54.12 CERVICAL RADICULOPATHY: Primary | ICD-10-CM

## 2019-07-22 DIAGNOSIS — M54.2 CERVICALGIA: ICD-10-CM

## 2019-07-22 PROCEDURE — 99213 OFFICE O/P EST LOW 20 MIN: CPT | Performed by: FAMILY MEDICINE

## 2019-07-22 PROCEDURE — G0463 HOSPITAL OUTPT CLINIC VISIT: HCPCS

## 2019-07-22 RX ORDER — ALPRAZOLAM 0.5 MG
TABLET ORAL
Qty: 2 TABLET | Refills: 0 | Status: SHIPPED | OUTPATIENT
Start: 2019-07-22 | End: 2020-03-11

## 2019-07-22 ASSESSMENT — ANXIETY QUESTIONNAIRES
7. FEELING AFRAID AS IF SOMETHING AWFUL MIGHT HAPPEN: NOT AT ALL
3. WORRYING TOO MUCH ABOUT DIFFERENT THINGS: SEVERAL DAYS
5. BEING SO RESTLESS THAT IT IS HARD TO SIT STILL: SEVERAL DAYS
6. BECOMING EASILY ANNOYED OR IRRITABLE: SEVERAL DAYS
GAD7 TOTAL SCORE: 6
1. FEELING NERVOUS, ANXIOUS, OR ON EDGE: SEVERAL DAYS
2. NOT BEING ABLE TO STOP OR CONTROL WORRYING: SEVERAL DAYS
IF YOU CHECKED OFF ANY PROBLEMS ON THIS QUESTIONNAIRE, HOW DIFFICULT HAVE THESE PROBLEMS MADE IT FOR YOU TO DO YOUR WORK, TAKE CARE OF THINGS AT HOME, OR GET ALONG WITH OTHER PEOPLE: SOMEWHAT DIFFICULT

## 2019-07-22 ASSESSMENT — PATIENT HEALTH QUESTIONNAIRE - PHQ9: 5. POOR APPETITE OR OVEREATING: SEVERAL DAYS

## 2019-07-22 ASSESSMENT — PAIN SCALES - GENERAL: PAINLEVEL: SEVERE PAIN (6)

## 2019-07-22 ASSESSMENT — MIFFLIN-ST. JEOR: SCORE: 1853.38

## 2019-07-22 NOTE — NURSING NOTE
Patient presents today for neck pain. Patient is needing a referral.  Medication Reconciliation Complete    Jessica Rodriguez LPN  7/22/2019 1:36 PM

## 2019-07-22 NOTE — PROGRESS NOTES
"SUBJECTIVE:  Dina Barraza is a 44 year old female here for neck pain radiating to her left arm.  She has had this going on for approximate 6 to 7 weeks.  She does not recall any particular injury or activity that brought this on.  She is right-hand dominant.  She describes pain starting in her neck going into her left hand.  Seems to be improved if she flexes and rotates her head to the left.  She has used anti-inflammatories, chiropractic care.  She had an MRI performed earlier which shows facet degenerative changes from C4-C7.  She also has C5-6 disc protrusion.  She reports an injection previously which was not helpful.    ROS:    As above otherwise ROS is unremarkable.    OBJECTIVE:  BP (!) 156/96   Pulse 80   Temp 98.4  F (36.9  C)   Resp 16   Ht 1.651 m (5' 5\")   Wt 119.7 kg (264 lb)   BMI 43.93 kg/m      EXAM:  General Appearance: Pleasant, alert, appropriate appearance for age. No acute distress  Musculoskeletal: She holds her left arm upward to help with her pain.  Range of motion of her cervical spine seems to be normal.  Neurologic Exam: Symmetric deltoid, biceps, triceps, wrist extension and  strength.    MRI cervical spine  FINDINGS:       There is straightening of the normal cervical lordosis. The vertebral  body heights are maintained. Scattered mild disc height loss is  present. No Modic edema or suspicious marrow lesion is identified.     The cervical cord has a normal caliber.  There are no areas of  abnormal cord signal.  No masses or fluid collections are seen in the  spinal canal or paravertebral soft tissues.  The craniocervical  junction is unremarkable.     C2-3: Mild uncovertebral and facet joint degenerative changes.  No  spinal stenosis. Minimal left neural foraminal stenosis.     C3-4: Mild uncovertebral and facet joint degenerative changes.  No  spinal stenosis. No neural foraminal stenosis.     C4-5: Minimal posterior disc osteophyte complex. Mild uncovertebral  and " facet joint degenerative changes. Mild spinal stenosis. Moderate  left neural foraminal stenosis.     C5-6: Mild posterior disc osteophyte complex with a shallow  superimposed left foraminal protrusion best seen on sagittal image 11  and axial image 17. Moderate uncovertebral and facet joint  degenerative changes. Mild spinal stenosis. Severe left and mild right  neural foraminal stenosis.     C6-7: Mild uncovertebral and facet joint degenerative changes. Minimal  spinal stenosis. Moderate left and minimal right neural foraminal  stenosis.     C7-T1: Mild uncovertebral and facet joint degenerative changes.  No  spinal stenosis. No neural foraminal stenosis.                                                                      IMPRESSION:     Asymmetric left-sided facet and uncovertebral degenerative changes  particularly at C4-5, C5-6 and C6-7.      At C5-6, a shallow left foraminal disc protrusion is suggested, which  could account for acute radiculopathy. Correlate for a left C6    ASSESSEMENT AND PLAN:    1. Cervical radiculopathy    2. Cervicalgia      We reviewed her images and results.  Based on her imaging would recommend transforaminal steroid injection at C5-C6.  We will also refer her to [facility] in case this injection is not helpful.  She is comfortable with this plan.  She will continue with ibuprofen regularly and Tylenol as needed.  She does not need any updated work restrictions.    Tobias Shah MD    This document was prepared using voice generated software.  While every attempt was made for accuracy, grammatical errors may exist.

## 2019-07-23 ENCOUNTER — TELEPHONE (OUTPATIENT)
Dept: FAMILY MEDICINE | Facility: OTHER | Age: 44
End: 2019-07-23

## 2019-07-23 ASSESSMENT — ASTHMA QUESTIONNAIRES: ACT_TOTALSCORE: 23

## 2019-07-23 ASSESSMENT — ANXIETY QUESTIONNAIRES: GAD7 TOTAL SCORE: 6

## 2019-07-23 NOTE — TELEPHONE ENCOUNTER
Patient was notified that she would need to go to see the spine surgeon first.    Jessica Rodriguez LPN on 7/23/2019 at 2:23 PM

## 2019-07-23 NOTE — TELEPHONE ENCOUNTER
sent over a note in regards to the patient.   wants patient to see the surgeon before she gets the injection. Patient would just like a call back regarding this. If she doesn't answer her cell phone please call her work number listed above.

## 2019-07-24 DIAGNOSIS — F41.1 ANXIETY STATE: ICD-10-CM

## 2019-07-26 RX ORDER — BUSPIRONE HYDROCHLORIDE 10 MG/1
10 TABLET ORAL 2 TIMES DAILY PRN
Qty: 60 TABLET | Refills: 0 | Status: SHIPPED | OUTPATIENT
Start: 2019-07-26 | End: 2019-11-14

## 2019-07-26 NOTE — TELEPHONE ENCOUNTER
"Requested Prescriptions   Pending Prescriptions Disp Refills     busPIRone (BUSPAR) 10 MG tablet [Pharmacy Med Name: BUSPIRONE 10MG TABLET] 60 tablet      Sig: TAKE 1 TABLET (10 MG) BY MOUTH 2 TIMES DAILY AS NEEDED       Atypical Antidepressants Protocol Passed - 7/26/2019  2:36 PM        Passed - Recent (12 mo) or future (30 days) visit within the authorizing provider's specialty     Patient had office visit in the last 12 months or has a visit in the next 30 days with authorizing provider or within the authorizing provider's specialty.  See \"Patient Info\" tab in inbasket, or \"Choose Columns\" in Meds & Orders section of the refill encounter.              Passed - Medication active on med list        Passed - Patient is age 18 or older        Passed - No active pregnancy on record        Passed - No positive pregnancy test in past 12 mos        sertraline (ZOLOFT) 50 MG tablet [Pharmacy Med Name: SERTRALINE 50MG TABLET] 60 tablet 1     Sig: TAKE 1-2 TABLETS ( MG) BY MOUTH DAILY       SSRIs Protocol Passed - 7/26/2019  2:37 PM        Passed - Recent (12 mo) or future (30 days) visit within the authorizing provider's specialty     Patient had office visit in the last 12 months or has a visit in the next 30 days with authorizing provider or within the authorizing provider's specialty.  See \"Patient Info\" tab in inbasket, or \"Choose Columns\" in Meds & Orders section of the refill encounter.              Passed - Medication is active on med list        Passed - Patient is age 18 or older        Passed - No active pregnancy on record        Passed - No positive pregnancy test in last 12 months      LOV 7/1/2019 with no changes in these medications. Prescription refilled per RN Medication RefillPolicy.................... Disha Alvarado ....................  7/26/2019   2:42 PM        "

## 2019-11-14 DIAGNOSIS — F41.1 ANXIETY STATE: ICD-10-CM

## 2019-11-18 RX ORDER — BUSPIRONE HYDROCHLORIDE 10 MG/1
10 TABLET ORAL 2 TIMES DAILY PRN
Qty: 60 TABLET | Refills: 0 | Status: SHIPPED | OUTPATIENT
Start: 2019-11-18 | End: 2020-01-15

## 2019-11-18 NOTE — TELEPHONE ENCOUNTER
"Requested Prescriptions   Pending Prescriptions Disp Refills     busPIRone (BUSPAR) 10 MG tablet [Pharmacy Med Name: BUSPIRONE 10MG TABLET] 60 tablet 0     Sig: TAKE 1 TABLET (10 MG) BY MOUTH 2 TIMES DAILY AS NEEDED       Atypical Antidepressants Protocol Passed - 11/14/2019 10:19 AM        Passed - Recent (12 mo) or future (30 days) visit within the authorizing provider's specialty     Patient has had an office visit with the authorizing provider or a provider within the authorizing providers department within the previous 12 mos or has a future within next 30 days. See \"Patient Info\" tab in inbasket, or \"Choose Columns\" in Meds & Orders section of the refill encounter.              Passed - Medication active on med list        Passed - Patient is age 18 or older        Passed - No active pregnancy on record        Passed - No positive pregnancy test in past 12 mos        LOV 7/22/19  Prescription approved per [facility] Refill Protocol.  Brenda J. Goodell, RN on 11/18/2019 at 12:46 PM      "

## 2020-01-15 DIAGNOSIS — F41.1 ANXIETY STATE: ICD-10-CM

## 2020-01-15 RX ORDER — BUSPIRONE HYDROCHLORIDE 10 MG/1
10 TABLET ORAL 2 TIMES DAILY PRN
Qty: 180 TABLET | Refills: 2 | Status: SHIPPED | OUTPATIENT
Start: 2020-01-15 | End: 2020-09-25

## 2020-01-15 NOTE — TELEPHONE ENCOUNTER
"Requested Prescriptions   Pending Prescriptions Disp Refills     busPIRone (BUSPAR) 10 MG tablet [Pharmacy Med Name: BUSPIRONE 10MG TABLET] 60 tablet 0     Sig: TAKE 1 TABLET (10 MG) BY MOUTH 2 TIMES DAILY AS NEEDED       Atypical Antidepressants Protocol Passed - 1/15/2020  9:50 AM        Passed - Recent (12 mo) or future (30 days) visit within the authorizing provider's specialty     Patient has had an office visit with the authorizing provider or a provider within the authorizing providers department within the previous 12 mos or has a future within next 30 days. See \"Patient Info\" tab in inbasket, or \"Choose Columns\" in Meds & Orders section of the refill encounter.              Passed - Medication active on med list        Passed - Patient is age 18 or older        Passed - No active pregnancy on record        Passed - No positive pregnancy test in past 12 mos        LOV 7/22/19  Prescription approved per [facility] Refill Protocol.  Brenda J. Goodell, RN on 1/15/2020 at 3:04 PM    "

## 2020-03-03 DIAGNOSIS — F41.1 ANXIETY STATE: ICD-10-CM

## 2020-03-06 NOTE — TELEPHONE ENCOUNTER
[id number]  sent Rx request for the following:        Last Office Visit:             7/22/19   Future Office visit:          none     Disp Refills Start End SHAQ   sertraline (ZOLOFT) 50 MG tablet 60 tablet 3 7/26/2019  No   Sig - Route: TAKE 1-2 TABLETS ( MG) BY MOUTH DAILY - Oral   Sent to pharmacy as: sertraline (ZOLOFT) 50 MG tablet   Class: E-Prescribe     Prescription approved per [facility] Refill Protocol.  Queenie Estrada RN .............. 3/6/2020  8:48 AM

## 2020-03-11 ENCOUNTER — OFFICE VISIT (OUTPATIENT)
Dept: FAMILY MEDICINE | Facility: OTHER | Age: 45
End: 2020-03-11
Attending: NURSE PRACTITIONER
Payer: COMMERCIAL

## 2020-03-11 VITALS
WEIGHT: 246.13 LBS | HEIGHT: 65 IN | HEART RATE: 80 BPM | TEMPERATURE: 98.7 F | SYSTOLIC BLOOD PRESSURE: 130 MMHG | RESPIRATION RATE: 20 BRPM | OXYGEN SATURATION: 97 % | BODY MASS INDEX: 41.01 KG/M2 | DIASTOLIC BLOOD PRESSURE: 82 MMHG

## 2020-03-11 DIAGNOSIS — F41.1 ANXIETY STATE: ICD-10-CM

## 2020-03-11 DIAGNOSIS — F41.0 PANIC ATTACK: Primary | ICD-10-CM

## 2020-03-11 PROCEDURE — 99214 OFFICE O/P EST MOD 30 MIN: CPT | Performed by: NURSE PRACTITIONER

## 2020-03-11 PROCEDURE — G0463 HOSPITAL OUTPT CLINIC VISIT: HCPCS

## 2020-03-11 RX ORDER — LORAZEPAM 0.5 MG/1
.5-1 TABLET ORAL EVERY 8 HOURS PRN
Qty: 10 TABLET | Refills: 0 | Status: SHIPPED | OUTPATIENT
Start: 2020-03-11 | End: 2020-03-23

## 2020-03-11 ASSESSMENT — ANXIETY QUESTIONNAIRES
GAD7 TOTAL SCORE: 21
3. WORRYING TOO MUCH ABOUT DIFFERENT THINGS: NEARLY EVERY DAY
5. BEING SO RESTLESS THAT IT IS HARD TO SIT STILL: NEARLY EVERY DAY
IF YOU CHECKED OFF ANY PROBLEMS ON THIS QUESTIONNAIRE, HOW DIFFICULT HAVE THESE PROBLEMS MADE IT FOR YOU TO DO YOUR WORK, TAKE CARE OF THINGS AT HOME, OR GET ALONG WITH OTHER PEOPLE: VERY DIFFICULT
7. FEELING AFRAID AS IF SOMETHING AWFUL MIGHT HAPPEN: NEARLY EVERY DAY
6. BECOMING EASILY ANNOYED OR IRRITABLE: NEARLY EVERY DAY
1. FEELING NERVOUS, ANXIOUS, OR ON EDGE: NEARLY EVERY DAY
2. NOT BEING ABLE TO STOP OR CONTROL WORRYING: NEARLY EVERY DAY

## 2020-03-11 ASSESSMENT — PATIENT HEALTH QUESTIONNAIRE - PHQ9
5. POOR APPETITE OR OVEREATING: NEARLY EVERY DAY
SUM OF ALL RESPONSES TO PHQ QUESTIONS 1-9: 22

## 2020-03-11 ASSESSMENT — ENCOUNTER SYMPTOMS
NERVOUS/ANXIOUS: 1
SLEEP DISTURBANCE: 0

## 2020-03-11 ASSESSMENT — MIFFLIN-ST. JEOR: SCORE: 1772.3

## 2020-03-11 ASSESSMENT — PAIN SCALES - GENERAL: PAINLEVEL: MILD PAIN (2)

## 2020-03-11 NOTE — NURSING NOTE
Patient presents to clinic for medication management.  She has been experiencing increased depression and anxiety.    Medication Reconciliation: complete    Shannan Flores LPN

## 2020-03-11 NOTE — PROGRESS NOTES
"  SUBJECTIVE:   Dina Barraza is a 44 year old female who presents to clinic today for the following health issues:    HPI  Patient presents for evaluation of anxiety/depression. Her last office visit with her PCP was 7/22/2019. She is currently taking Buspar twice daily and Zoloft 100 mg daily. She worries excessively, and cries at the drop of the hat. She has not seeing a counselor. She has seen Dre Murillo in the past and would be open to going back. She notes increased stressors including work, moving, her 3 children, significant other and concerns about the COVID 19 virus. She has occasional thoughts of suicide, but \"I could never do that to my kids.\"     Patient Active Problem List    Diagnosis Date Noted     Essential hypertension 07/01/2019     Priority: Medium     Morbid obesity (H) 01/29/2019     Priority: Medium     Anxiety state 01/24/2018     Priority: Medium     Gastroesophageal reflux disease 01/24/2018     Priority: Medium     Obesity 01/24/2018     Priority: Medium     Mild intermittent asthma without complication 02/21/2013     Priority: Medium     Overview:   Illness-induced       Past Medical History:   Diagnosis Date     Major depressive disorder, single episode     During an unhealthy past marriage     Mild intermittent asthma, uncomplicated     Mild intermittent asthma      Past Surgical History:   Procedure Laterality Date     LAPAROSCOPIC CHOLECYSTECTOMY      08/09/02     LAPAROSCOPIC TUBAL LIGATION      2005       Review of Systems   Psychiatric/Behavioral: Positive for mood changes. Negative for self-injury, sleep disturbance and suicidal ideas. The patient is nervous/anxious.         OBJECTIVE:     /82 (BP Location: Right arm, Patient Position: Sitting, Cuff Size: Adult Large)   Pulse 80   Temp 98.7  F (37.1  C) (Tympanic)   Resp 20   Ht 1.651 m (5' 5\")   Wt 111.6 kg (246 lb 2 oz)   LMP  (LMP Unknown)   SpO2 97%   Breastfeeding No   BMI 40.96 kg/m    Body mass index is " 40.96 kg/m .  Physical Exam  Constitutional:       Appearance: Normal appearance. She is obese.   Neurological:      Mental Status: She is alert.   Psychiatric:         Mood and Affect: Mood is depressed.         Speech: Speech normal.         Behavior: Behavior normal.         Thought Content: Thought content normal. Thought content does not include suicidal ideation. Thought content does not include homicidal or suicidal plan.         Diagnostic Test Results:  none     ASSESSMENT/PLAN:   1. Anxiety state  Plan on increase to 150 mg of Zoloft daily. She was previously on 200 mg in the past and found it to be effective. She is taking Buspar twice daily, but does not find it effective. She will remain on that current dose. She will have follow-up with her PCP in 1 month. She is also interested in counseling, referral placed and she will see if she can be seen by Dre again, phone number provided.   - sertraline (ZOLOFT) 50 MG tablet; Take 2-3 tablets (100-150 mg) by mouth daily  Dispense: 60 tablet; Refill: 0  - MENTAL HEALTH REFERRAL  - Adult; Outpatient Treatment; Individual/Couples/Family/Group Therapy/Health Psychology; Other: [facility] [phone]; We will contact you to schedule the appointment or please call with any questions    2. Panic attack  She would like a small supply of ativan for extreme cases of anxiety. She was given 10 tabs at this visit.   - LORazepam (ATIVAN) 0.5 MG tablet; Take 1-2 tablets (0.5-1 mg) by mouth every 8 hours as needed for anxiety, muscle spasms or pain  Dispense: 10 tablet; Refill: 0     I spent approximately 25 minutes with the patient (exclusive of separately billed services/procedures), with greater than 50% spent in counseling, prognosis, risks and benefits of management or follow-up.  Reviewed importance of compliance with chosen treatment options and follow-up.  Risk factor reduction and patient education and coordinating care, establishing and/or reviewing the  patient's medical record also completed during today's exam .      Kajal Beavers [facility]

## 2020-03-12 ASSESSMENT — ASTHMA QUESTIONNAIRES: ACT_TOTALSCORE: 24

## 2020-03-12 ASSESSMENT — ANXIETY QUESTIONNAIRES: GAD7 TOTAL SCORE: 21

## 2020-04-13 ENCOUNTER — VIRTUAL VISIT (OUTPATIENT)
Dept: FAMILY MEDICINE | Facility: OTHER | Age: 45
End: 2020-04-13
Attending: NURSE PRACTITIONER
Payer: COMMERCIAL

## 2020-04-13 DIAGNOSIS — F41.0 PANIC ATTACK: ICD-10-CM

## 2020-04-13 DIAGNOSIS — F41.1 ANXIETY STATE: Primary | ICD-10-CM

## 2020-04-13 PROCEDURE — 99441 ZZC PHYSICIAN TELEPHONE EVALUATION 5-10 MIN: CPT | Performed by: NURSE PRACTITIONER

## 2020-04-13 RX ORDER — LORAZEPAM 0.5 MG/1
1 TABLET ORAL
Qty: 60 TABLET | Refills: 1 | Status: SHIPPED | OUTPATIENT
Start: 2020-04-13 | End: 2020-06-08

## 2020-04-13 RX ORDER — SERTRALINE HYDROCHLORIDE 100 MG/1
200 TABLET, FILM COATED ORAL DAILY
Qty: 180 TABLET | Refills: 3 | Status: SHIPPED | OUTPATIENT
Start: 2020-04-13 | End: 2022-06-20

## 2020-04-13 ASSESSMENT — PATIENT HEALTH QUESTIONNAIRE - PHQ9
SUM OF ALL RESPONSES TO PHQ QUESTIONS 1-9: 1
5. POOR APPETITE OR OVEREATING: SEVERAL DAYS

## 2020-04-13 ASSESSMENT — ANXIETY QUESTIONNAIRES
1. FEELING NERVOUS, ANXIOUS, OR ON EDGE: NOT AT ALL
GAD7 TOTAL SCORE: 4
6. BECOMING EASILY ANNOYED OR IRRITABLE: NOT AT ALL
3. WORRYING TOO MUCH ABOUT DIFFERENT THINGS: SEVERAL DAYS
2. NOT BEING ABLE TO STOP OR CONTROL WORRYING: SEVERAL DAYS
7. FEELING AFRAID AS IF SOMETHING AWFUL MIGHT HAPPEN: SEVERAL DAYS
5. BEING SO RESTLESS THAT IT IS HARD TO SIT STILL: NOT AT ALL

## 2020-04-13 NOTE — PROGRESS NOTES
"Dina Barraza is a 44 year old female who is being evaluated via a billable telephone visit.      The patient has been notified of following:     \"This telephone visit will be conducted via a call between you and your physician/provider. We have found that certain health care needs can be provided without the need for a physical exam.  This service lets us provide the care you need with a short phone conversation.  If a prescription is necessary we can send it directly to your pharmacy.  If lab work is needed we can place an order for that and you can then stop by our lab to have the test done at a later time.    Telephone visits are billed at different rates depending on your insurance coverage. During this emergency period, for some insurers they may be billed the same as an in-person visit.  Please reach out to your insurance provider with any questions.    If during the course of the call the physician/provider feels a telephone visit is not appropriate, you will not be charged for this service.\"    Patient has given verbal consent for Telephone visit?  Yes    How would you like to obtain your AVS? Not needed    Subjective     Dina Barraza is a 44 year old female who presents to clinic today for the following health issues:    Depression and Anxiety Follow-Up    How are you doing with your depression since your last visit? No change    How are you doing with your anxiety since your last visit?  No change    Are you having other symptoms that might be associated with depression or anxiety? No    Have you had a significant life event? No     Do you have any concerns with your use of alcohol or other drugs? No     She reports she continues take the sertraline at 200 mg daily.  She is tolerating this well.  She feels this is working really well for her day-to-day anxiety.  She does use lorazepam at night to help with sleep as this is when she notices her mind is racing.  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  She has 3 teenage children that are struggling with the distance learning.  This is because some increased stress for her as well.  Overall she feels like things are going well and is just looking for refill of medications.    Social History     Tobacco Use     Smoking status: Former Smoker     Packs/day: 0.50     Years: 12.00     Pack years: 6.00     Types: Cigarettes     Last attempt to quit: 2018     Years since quittin.2     Smokeless tobacco: Never Used   Substance Use Topics     Alcohol use: Yes     Comment: Alcoholic Drinks/day: rare     Drug use: Unknown     Types: Other     Comment: Drug use: No     PHQ 2019 3/11/2020 2020   PHQ-9 Total Score 3 22 1   Q9: Thoughts of better off dead/self-harm past 2 weeks Not at all Several days Not at all     DARYN-7 SCORE 2019 3/11/2020 2020   Total Score 6 21 4     Last PHQ-9 2020   1.  Little interest or pleasure in doing things 0   2.  Feeling down, depressed, or hopeless 0   3.  Trouble falling or staying asleep, or sleeping too much 1   4.  Feeling tired or having little energy 0   5.  Poor appetite or overeating 0   6.  Feeling bad about yourself 0   7.  Trouble concentrating 0   8.  Moving slowly or restless 0   Q9: Thoughts of better off dead/self-harm past 2 weeks 0   PHQ-9 Total Score 1   Difficulty at work, home, or with people -     DARYN-7  2020   1. Feeling nervous, anxious, or on edge 0   2. Not being able to stop or control worrying 1   3. Worrying too much about different things 1   4. Trouble relaxing 1   5. Being so restless that it is hard to sit still 0   6. Becoming easily annoyed or irritable 0   7. Feeling afraid, as if something awful might happen 1   DARYN-7 Total Score 4   If you checked any problems, how difficult have they made it for you to do your work, take care of things at home, or get along with other people? -       Patient Active Problem List   Diagnosis     Anxiety  state     Gastroesophageal reflux disease     Obesity     Mild intermittent asthma without complication     Morbid obesity (H)     Essential hypertension     Past Surgical History:   Procedure Laterality Date     LAPAROSCOPIC CHOLECYSTECTOMY      02     LAPAROSCOPIC TUBAL LIGATION             Social History     Tobacco Use     Smoking status: Former Smoker     Packs/day: 0.50     Years: 12.00     Pack years: 6.00     Types: Cigarettes     Last attempt to quit: 2018     Years since quittin.2     Smokeless tobacco: Never Used   Substance Use Topics     Alcohol use: Yes     Comment: Alcoholic Drinks/day: rare     Family History   Problem Relation Age of Onset     Diabetes Mother         Diabetes     Hypertension Mother         Hypertension     Diabetes Father         Diabetes,type 2     Heart Disease Father         Heart Disease, MI, CVA and CABG     Heart Disease Paternal Grandfather         Heart Disease,MI age 60,      Hypertension Maternal Grandmother         Hypertension     Diabetes Maternal Aunt         Diabetes,type 2     Diabetes Maternal Uncle         Diabetes,type 2     Family History Negative Son         Good Health     Family History Negative Son         Good Health,Asthma     Family History Negative Daughter         Good Health,Asthma         Current Outpatient Medications   Medication Sig Dispense Refill     albuterol (PROAIR HFA/PROVENTIL HFA/VENTOLIN HFA) 108 (90 BASE) MCG/ACT Inhaler Inhale 1-2 puffs into the lungs every 6 hours as needed       busPIRone (BUSPAR) 10 MG tablet TAKE 1 TABLET (10 MG) BY MOUTH 2 TIMES DAILY AS NEEDED 180 tablet 2     lisinopril-hydrochlorothiazide (PRINZIDE/ZESTORETIC) 10-12.5 MG tablet Take 1 tablet by mouth daily 30 tablet 5     LORazepam (ATIVAN) 0.5 MG tablet Take 2 tablets (1 mg) by mouth nightly as needed for anxiety or sleep 60 tablet 1     sertraline (ZOLOFT) 100 MG tablet Take 2 tablets (200 mg) by mouth daily 180 tablet 3     sertraline  (ZOLOFT) 50 MG tablet Take 2-3 tablets (100-150 mg) by mouth daily 60 tablet 0     Allergies   Allergen Reactions     Ranitidine Hives     Oxycodone Rash     Sulfa Drugs Rash     Sulfamethoxazole-Trimethoprim Rash       Reviewed and updated as needed this visit by Provider         Review of Systems   ROS COMP: As noted above       Objective   Reported vitals:  There were no vitals taken for this visit.   healthy, alert and no distress  PSYCH: Alert and oriented times 3; coherent speech, normal   rate and volume, able to articulate logical thoughts, able   to abstract reason, no tangential thoughts, no hallucinations   or delusions  Her affect is normal  RESP: No cough, no audible wheezing, able to talk in full sentences  Remainder of exam unable to be completed due to telephone visits        Assessment/Plan:  1. Panic attack  Refill of sertraline at 200 mg daily as well as Lorazepam.  She typically takes 2 tablets to equal 1 mg at night as needed to help with sleep.  Plan to follow-up as needed.  - sertraline (ZOLOFT) 100 MG tablet; Take 2 tablets (200 mg) by mouth daily  Dispense: 180 tablet; Refill: 3  - LORazepam (ATIVAN) 0.5 MG tablet; Take 2 tablets (1 mg) by mouth nightly as needed for anxiety or sleep  Dispense: 60 tablet; Refill: 1    2. Anxiety state    - sertraline (ZOLOFT) 100 MG tablet; Take 2 tablets (200 mg) by mouth daily  Dispense: 180 tablet; Refill: 3    No follow-ups on file.      Phone call duration:  7 minutes    LEAH Melendez CNP

## 2020-04-13 NOTE — TELEPHONE ENCOUNTER
"[id number] of [place] ([employer]) sent Rx request for the following:      sertraline (ZOLOFT) 50 MG tablet      Sig: Take 2-3 tablets (100-150 mg) by mouth daily    Last Prescription Date:   3/11/20  Last Fill Qty/Refills:         60, R-0    Last Office Visit:              3/11/20  Future Office visit:           None.    Per LOV Note, Pt was instructed to follow-up 1 month later; around 4/11/20. Called and spoke to Patient after verifying last name and date of birth. She was reminded of the above information and she is interested in telephone visit with available provider.     She states, \"When I was in the office visit with Kajal Beavers, she told me I could take 3-4 tabs (150 or 200 mg) and to let Dr. Shah know how it was going. Prescription was written for taking only 2-3 tabs (100-150 mg). I have been taking 200 mg daily, with all this crap going on, my anxiety has been increased.\" This dose has been effective for Pt. Pt currently OUT OF MEDICATION. Robb'd up as requested by Pt.     Pt transferred to scheduling line, to set up appointment:  Next 5 appointments (look out 90 days)    Apr 13, 2020  4:00 PM CDT  Telephone Visit with LEAH Roy CNP  [facility] ([facility]) [address]  [place] [id number] [phone]        Will route to Kemi Conklin to address during today's telephone visit. Unable to complete prescription refill per RN Medication Refill Policy. Shannan Cortez RN .............. 4/13/2020  3:30 PM      "

## 2020-04-14 ASSESSMENT — ANXIETY QUESTIONNAIRES: GAD7 TOTAL SCORE: 4

## 2020-05-05 DIAGNOSIS — I10 ESSENTIAL HYPERTENSION: ICD-10-CM

## 2020-05-05 DIAGNOSIS — F41.0 PANIC ATTACK: ICD-10-CM

## 2020-05-05 RX ORDER — LISINOPRIL/HYDROCHLOROTHIAZIDE 10-12.5 MG
1 TABLET ORAL DAILY
Qty: 30 TABLET | Refills: 5 | Status: SHIPPED | OUTPATIENT
Start: 2020-05-05 | End: 2022-06-20

## 2020-05-05 NOTE — TELEPHONE ENCOUNTER
[id number] sent Rx request for the following:      LISINOPRIL/HCTZ 10-12.5MG TAB   Sig: Take 1 tablet by mouth daily       Last Prescription Date:   7/1/2019  Last Fill Qty/Refills:         30, R-5    Last Office Visit:              4/13/2020   Future Office visit:           none      Prescription refilled per RN Medication Refill Policy.................... Porfirio Castro RN ....................  5/5/2020   3:15 PM

## 2020-05-06 RX ORDER — LORAZEPAM 0.5 MG/1
1 TABLET ORAL
Qty: 60 TABLET | OUTPATIENT
Start: 2020-05-06

## 2020-05-06 NOTE — TELEPHONE ENCOUNTER
Lorazepam refilled on 4/13/2020 #60 x 1 refill  To [facility].    Vidya Floyd RN on 5/6/2020 at 2:03 PM

## 2020-06-05 DIAGNOSIS — F41.0 PANIC ATTACK: Primary | ICD-10-CM

## 2020-06-05 NOTE — TELEPHONE ENCOUNTER
sent Rx request for the following:   LORazepam (ATIVAN) 0.5 MG tablet  Sig:  Take 2 tablets (1 mg) by mouth nightly as needed for anxiety or sleep    Last Prescription Date:   4/13/2020  Last Fill Qty/Refills:         60, R-1    Last Office Visit:              4/13/2020   Future Office visit:           None  Routing refill request to provider for review/approval because:  Drug not on the [facility], [facility] or [facility] refill protocol or controlled substance    Unable to complete prescription refill per RN Medication Refill Policy.................... Taty See RN ....................  6/5/2020   3:34 PM

## 2020-06-08 RX ORDER — LORAZEPAM 0.5 MG/1
1 TABLET ORAL
Qty: 60 TABLET | Refills: 1 | Status: SHIPPED | OUTPATIENT
Start: 2020-06-08 | End: 2020-08-03

## 2020-06-16 NOTE — PROGRESS NOTES
Patient Information     Patient Name  Dina Barraza MRN  [id number] Sex  Female   1975      Letter by Lanre Shah MD at      Author:  Lanre Shah MD Service:  (none) Author Type:  (none)    Filed:   Encounter Date:  2017 Status:  (Other)           Dina Barraza  [address]  [facility] [address]          2017    Dear Ms. Barraza:    This is to remind you that you are overdue for an office visit with Lanre Shah MD to discuss continued use of wellbutrin and setraline. Additional refills of your medication require you to complete this visit.    Please call [phone] to schedule your appointment.    Thank you for choosing [facility] for your health care needs.    Sincerely,      Refill RN  [facility]        
43 yo well appearing female with acute GI upset prompting evaluation. I personally saw the patient with the PA, and completed the key components of the history and physical exam. I then discussed the management plan with the PA.

## 2020-08-02 DIAGNOSIS — F41.0 PANIC ATTACK: ICD-10-CM

## 2020-08-03 RX ORDER — LORAZEPAM 0.5 MG/1
1 TABLET ORAL
Qty: 60 TABLET | Refills: 5 | Status: SHIPPED | OUTPATIENT
Start: 2020-08-03 | End: 2020-10-09

## 2020-08-23 ENCOUNTER — MEDICAL CORRESPONDENCE (OUTPATIENT)
Dept: HEALTH INFORMATION MANAGEMENT | Facility: OTHER | Age: 45
End: 2020-08-23

## 2020-08-23 ENCOUNTER — RESULTS ONLY (OUTPATIENT)
Dept: LAB | Age: 45
End: 2020-08-23

## 2020-08-26 LAB
SARS-COV-2 RNA SPEC QL NAA+PROBE: NOT DETECTED
SPECIMEN SOURCE: NORMAL

## 2020-09-08 ENCOUNTER — ALLIED HEALTH/NURSE VISIT (OUTPATIENT)
Dept: FAMILY MEDICINE | Facility: OTHER | Age: 45
End: 2020-09-08
Payer: COMMERCIAL

## 2020-09-08 DIAGNOSIS — R51.9 HEAD ACHE: ICD-10-CM

## 2020-09-08 DIAGNOSIS — J02.9 SORE THROAT: Primary | ICD-10-CM

## 2020-09-08 PROCEDURE — U0003 INFECTIOUS AGENT DETECTION BY NUCLEIC ACID (DNA OR RNA); SEVERE ACUTE RESPIRATORY SYNDROME CORONAVIRUS 2 (SARS-COV-2) (CORONAVIRUS DISEASE [COVID-19]), AMPLIFIED PROBE TECHNIQUE, MAKING USE OF HIGH THROUGHPUT TECHNOLOGIES AS DESCRIBED BY CMS-2020-01-R: HCPCS | Mod: ZL

## 2020-09-08 PROCEDURE — C9803 HOPD COVID-19 SPEC COLLECT: HCPCS

## 2020-09-08 PROCEDURE — 99207 ZZC NO CHARGE NURSE ONLY: CPT

## 2020-09-08 NOTE — NURSING NOTE
Chief Complaint   Patient presents with     Covid 19 Testing     sore throat, headache       Patient swabbed for COVID-19 testing.  Tim Grant LPN on 9/8/2020 at 11:15 AM

## 2020-09-10 LAB
SARS-COV-2 RNA SPEC QL NAA+PROBE: NOT DETECTED
SPECIMEN SOURCE: NORMAL

## 2020-09-23 DIAGNOSIS — F41.1 ANXIETY STATE: ICD-10-CM

## 2020-09-25 RX ORDER — BUSPIRONE HYDROCHLORIDE 10 MG/1
TABLET ORAL
Qty: 180 TABLET | Refills: 1 | Status: SHIPPED | OUTPATIENT
Start: 2020-09-25 | End: 2022-03-30 | Stop reason: ALTCHOICE

## 2020-09-25 NOTE — TELEPHONE ENCOUNTER
sent Rx request for the following:   busPIRone (BUSPAR) 10 MG tablet   Sig: TAKE 1 TABLET (10 MG) BY MOUTH TWO TIMES DAILY AS NEEDED    Last Prescription Date:   1/15/2020  Last Fill Qty/Refills:         180, R-2    Last Office Visit:              4/13/2020   Future Office visit:           None    Prescription refilled per RN Medication Refill Policy.................... Taty See RN ....................  9/25/2020   3:02 PM

## 2020-10-09 ENCOUNTER — MYC MEDICAL ADVICE (OUTPATIENT)
Dept: FAMILY MEDICINE | Facility: OTHER | Age: 45
End: 2020-10-09

## 2020-10-09 DIAGNOSIS — F41.0 PANIC ATTACK: Primary | ICD-10-CM

## 2020-10-09 NOTE — TELEPHONE ENCOUNTER
Kemi Conklin is working in [facility] [employer] (out of the clinic) today, and not scheduled to return until Friday 10/16/20.    Last prescription:  08/03/20 1324 Lanre Sprague MD Reorder from Order:[id number]     LORazepam (ATIVAN) 0.5 MG tablet 60 tablet 5 8/3/2020  No   Sig - Route: TAKE 2 TABLETS (1 MG) BY MOUTH NIGHTLY AS NEEDED FOR ANXIETY OR SLEEP - Oral     Called and spoke to Patient after verifying last name and date of birth. Pt states she has been taking 1-2 tablets during the day and 2 at night, for the past week. Last dispensed 9/25/20 for #60. Pt is requesting new prescription for increased dose, as she will run out twice as soon. Pt has enough to get through until Monday, when Dr. Shah (PCP) returns.     Routing to Dr. Shah for review. Please call Pt with status.     Shannan Cortez RN .............. 10/9/2020  4:40 PM

## 2020-10-12 RX ORDER — LORAZEPAM 0.5 MG/1
TABLET ORAL
Qty: 180 TABLET | Refills: 0 | Status: SHIPPED | OUTPATIENT
Start: 2020-10-12 | End: 2020-11-30

## 2020-11-27 DIAGNOSIS — F41.0 PANIC ATTACK: ICD-10-CM

## 2020-11-30 ENCOUNTER — VIRTUAL VISIT (OUTPATIENT)
Dept: FAMILY MEDICINE | Facility: OTHER | Age: 45
End: 2020-11-30
Attending: FAMILY MEDICINE
Payer: COMMERCIAL

## 2020-11-30 DIAGNOSIS — F41.1 ANXIETY STATE: Primary | ICD-10-CM

## 2020-11-30 DIAGNOSIS — F41.0 PANIC ATTACK: ICD-10-CM

## 2020-11-30 PROCEDURE — 99441 PR PHYSICIAN TELEPHONE EVALUATION 5-10 MIN: CPT | Performed by: FAMILY MEDICINE

## 2020-11-30 RX ORDER — LORAZEPAM 0.5 MG/1
TABLET ORAL
Qty: 120 TABLET | Refills: 3 | Status: SHIPPED | OUTPATIENT
Start: 2020-11-30 | End: 2021-03-29

## 2020-11-30 RX ORDER — LORAZEPAM 0.5 MG/1
TABLET ORAL
Qty: 44 TABLET | OUTPATIENT
Start: 2020-11-30

## 2020-11-30 ASSESSMENT — ANXIETY QUESTIONNAIRES
7. FEELING AFRAID AS IF SOMETHING AWFUL MIGHT HAPPEN: SEVERAL DAYS
3. WORRYING TOO MUCH ABOUT DIFFERENT THINGS: MORE THAN HALF THE DAYS
GAD7 TOTAL SCORE: 12
IF YOU CHECKED OFF ANY PROBLEMS ON THIS QUESTIONNAIRE, HOW DIFFICULT HAVE THESE PROBLEMS MADE IT FOR YOU TO DO YOUR WORK, TAKE CARE OF THINGS AT HOME, OR GET ALONG WITH OTHER PEOPLE: VERY DIFFICULT
5. BEING SO RESTLESS THAT IT IS HARD TO SIT STILL: SEVERAL DAYS
6. BECOMING EASILY ANNOYED OR IRRITABLE: SEVERAL DAYS
1. FEELING NERVOUS, ANXIOUS, OR ON EDGE: NEARLY EVERY DAY
2. NOT BEING ABLE TO STOP OR CONTROL WORRYING: NEARLY EVERY DAY

## 2020-11-30 ASSESSMENT — PAIN SCALES - GENERAL: PAINLEVEL: NO PAIN (0)

## 2020-11-30 ASSESSMENT — PATIENT HEALTH QUESTIONNAIRE - PHQ9
5. POOR APPETITE OR OVEREATING: SEVERAL DAYS
SUM OF ALL RESPONSES TO PHQ QUESTIONS 1-9: 10

## 2020-11-30 NOTE — NURSING NOTE
Patient is needing visit today for follow up on medications.  .  Medication Reconciliation Complete    Jessica Rodriguez LPN  11/30/2020 2:38 PM

## 2020-11-30 NOTE — TELEPHONE ENCOUNTER
[id number] sent Rx request for the following:   LORazepam (ATIVAN) 0.5 MG tablet  Sig:TAKE 1-2 TABLETS BY MOUTH DAILY AND 2 TABLETS NIGHTLY AS NEEDED FOR ANXIETY OR SLEEP    Last Prescription Date:   10/12/2020  Last Fill Qty/Refills:         180, R-0    Last Office Visit:              2020 (Virtual- Conklin)   Future Office visit:               Routing refill request to provider for review/approval because:  Drug not on the [facility], [facility] or [facility] refill protocol or controlled substance    Call made to patient to inquire Rx need with increase dose. Patient verified name and , is still in need of increase dose, stating out of Rx as of current. Patient willing to schedule virtual visit to review Rx at this time. Scheduled 2020. Medication to be reviewed at that time. Request currently denied.   Unable to complete prescription refill per RN Medication Refill Policy.................... Lucy Winkler RN ....................  2020   10:32 AM

## 2020-11-30 NOTE — PROGRESS NOTES
"Dina Barraza is a 45 year old female who is being evaluated via a billable telephone visit.      The patient has been notified of following:     \"This telephone visit will be conducted via a call between you and your physician/provider. We have found that certain health care needs can be provided without the need for a physical exam.  This service lets us provide the care you need with a short phone conversation.  If a prescription is necessary we can send it directly to your pharmacy.  If lab work is needed we can place an order for that and you can then stop by our lab to have the test done at a later time.    Telephone visits are billed at different rates depending on your insurance coverage. During this emergency period, for some insurers they may be billed the same as an in-person visit.  Please reach out to your insurance provider with any questions.    If during the course of the call the physician/provider feels a telephone visit is not appropriate, you will not be charged for this service.\"    Patient has given verbal consent for Telephone visit?  Yes    What phone number would you like to be contacted at? [phone]    How would you like to obtain your AVS? Shaquille Fletcher     Dina Barraza is a 45 year old female who presents via phone visit today for the following health issues:    HPI     She has a history of anxiety.  She continues on Zoloft 200 mg daily and BuSpar 10 mg twice daily.  She has had a difficult time over the last month with losing her job, getting rehired and now recently her hours have been cut.  She also continues struggle with her children and boyfriend.  She is working on buying a different home for her and her children.  She at this time has been using Ativan 1 to 2 tablets in the day and 2 tablets at night to help with her anxiety and with sleep.  She has tried to decrease this but that has not worked very well.    Review of Systems   Constitutional, HEENT, " cardiovascular, pulmonary, gi and gu systems are negative, except as otherwise noted.       Objective          Vitals:  No vitals were obtained today due to virtual visit.    healthy, alert and no distress  PSYCH: Alert and oriented times 3; coherent speech, normal   rate and volume, able to articulate logical thoughts, able   to abstract reason, no tangential thoughts, no hallucinations   or delusions  Her affect is normal  RESP: No cough, no audible wheezing, able to talk in full sentences  Remainder of exam unable to be completed due to telephone visits          Assessment/Plan:    Assessment & Plan     Dina was seen today for medication therapy management.    Diagnoses and all orders for this visit:    Anxiety state    Panic attack  -     LORazepam (ATIVAN) 0.5 MG tablet; Take 1-2 tablets daily and 2 tablets nightly as needed for anxiety or sleep    Certainly her anxiety continues to be a challenge for her.  We discussed options for her as needed medication including trying a different benzodiazepine or medication such as Vistaril.  At this time we will continue her current regimen of lorazepam 1 to 2 tablets during the day and 2 tablets at night.  We will try to get through the holiday season and reassess later this winter.  Discussed the potential for tolerance to these types of medications.  She will follow-up in a few months or sooner if any of her symptoms are worsening.     Depression Screening Follow Up    PHQ 11/30/2020   PHQ-9 Total Score 10   Q9: Thoughts of better off dead/self-harm past 2 weeks Not at all     Last PHQ-9 11/30/2020   1.  Little interest or pleasure in doing things 2   2.  Feeling down, depressed, or hopeless 2   3.  Trouble falling or staying asleep, or sleeping too much 3   4.  Feeling tired or having little energy 0   5.  Poor appetite or overeating 1   6.  Feeling bad about yourself 1   7.  Trouble concentrating 1   8.  Moving slowly or restless 0   Q9: Thoughts of better off  dead/self-harm past 2 weeks 0   PHQ-9 Total Score 10   Difficulty at work, home, or with people -       No follow-ups on file.    [facility]    Phone call duration:  9 minutes

## 2020-12-01 ASSESSMENT — ANXIETY QUESTIONNAIRES: GAD7 TOTAL SCORE: 12

## 2021-01-03 ENCOUNTER — HEALTH MAINTENANCE LETTER (OUTPATIENT)
Age: 46
End: 2021-01-03

## 2021-03-06 ENCOUNTER — HEALTH MAINTENANCE LETTER (OUTPATIENT)
Age: 46
End: 2021-03-06

## 2021-03-27 DIAGNOSIS — F41.0 PANIC ATTACK: ICD-10-CM

## 2021-03-29 RX ORDER — LORAZEPAM 0.5 MG/1
TABLET ORAL
Qty: 120 TABLET | Refills: 5 | Status: SHIPPED | OUTPATIENT
Start: 2021-03-29 | End: 2021-09-27

## 2021-03-29 NOTE — TELEPHONE ENCOUNTER
[facility]ill request from [facility] for Lorazepam 0.5 mg tablet.  Medication is not on RN Refill protocol due to being a controlled substance.  Last office visit 11/30/2020-it was a virtual visit.  Routing to PCP to address refill at this time.  Unable to complete prescription refill per RN Medication Refill Policy. Serena Magallanes RN 3/29/2021 9:44 AM

## 2021-04-25 ENCOUNTER — HEALTH MAINTENANCE LETTER (OUTPATIENT)
Age: 46
End: 2021-04-25

## 2021-08-09 ENCOUNTER — MYC MEDICAL ADVICE (OUTPATIENT)
Dept: FAMILY MEDICINE | Facility: OTHER | Age: 46
End: 2021-08-09

## 2021-08-09 NOTE — TELEPHONE ENCOUNTER
Contacted patient and offered her an appointment but she declined. Patient said, she would follow up tomorrow for an appointment if it was needed.    Jessica Rodriguez LPN on 8/9/2021 at 4:15 PM

## 2021-08-19 ENCOUNTER — TELEPHONE (OUTPATIENT)
Dept: FAMILY MEDICINE | Facility: OTHER | Age: 46
End: 2021-08-19

## 2021-08-19 ENCOUNTER — HOSPITAL ENCOUNTER (EMERGENCY)
Facility: OTHER | Age: 46
Discharge: HOME | End: 2021-08-19
Attending: EMERGENCY MEDICINE | Admitting: EMERGENCY MEDICINE
Payer: COMMERCIAL

## 2021-08-19 ENCOUNTER — MYC MEDICAL ADVICE (OUTPATIENT)
Dept: FAMILY MEDICINE | Facility: OTHER | Age: 46
End: 2021-08-19

## 2021-08-19 ENCOUNTER — NURSE TRIAGE (OUTPATIENT)
Dept: FAMILY MEDICINE | Facility: OTHER | Age: 46
End: 2021-08-19

## 2021-08-19 ENCOUNTER — OFFICE VISIT (OUTPATIENT)
Dept: FAMILY MEDICINE | Facility: OTHER | Age: 46
End: 2021-08-19
Attending: FAMILY MEDICINE
Payer: COMMERCIAL

## 2021-08-19 VITALS
TEMPERATURE: 97.8 F | WEIGHT: 219 LBS | HEART RATE: 69 BPM | RESPIRATION RATE: 18 BRPM | OXYGEN SATURATION: 99 % | DIASTOLIC BLOOD PRESSURE: 104 MMHG | BODY MASS INDEX: 36.49 KG/M2 | HEIGHT: 65 IN | SYSTOLIC BLOOD PRESSURE: 175 MMHG

## 2021-08-19 VITALS
BODY MASS INDEX: 36.42 KG/M2 | TEMPERATURE: 99.1 F | SYSTOLIC BLOOD PRESSURE: 150 MMHG | OXYGEN SATURATION: 99 % | HEIGHT: 65 IN | RESPIRATION RATE: 18 BRPM | WEIGHT: 218.6 LBS | HEART RATE: 69 BPM | DIASTOLIC BLOOD PRESSURE: 88 MMHG

## 2021-08-19 DIAGNOSIS — J45.20 MILD INTERMITTENT ASTHMA WITHOUT COMPLICATION: ICD-10-CM

## 2021-08-19 DIAGNOSIS — N94.6 DYSMENORRHEA: ICD-10-CM

## 2021-08-19 DIAGNOSIS — I10 ESSENTIAL HYPERTENSION: ICD-10-CM

## 2021-08-19 DIAGNOSIS — N83.8 OVARIAN MASS: ICD-10-CM

## 2021-08-19 DIAGNOSIS — I10 HYPERTENSION, UNSPECIFIED TYPE: ICD-10-CM

## 2021-08-19 DIAGNOSIS — N94.6 DYSMENORRHEA: Primary | ICD-10-CM

## 2021-08-19 LAB
ALBUMIN UR-MCNC: NEGATIVE MG/DL
ANION GAP SERPL CALCULATED.3IONS-SCNC: 6 MMOL/L (ref 3–14)
APPEARANCE UR: CLEAR
BACTERIA #/AREA URNS HPF: ABNORMAL /HPF
BASOPHILS # BLD AUTO: 0.1 10E3/UL (ref 0–0.2)
BASOPHILS NFR BLD AUTO: 1 %
BILIRUB UR QL STRIP: NEGATIVE
BUN SERPL-MCNC: 8 MG/DL (ref 7–25)
CALCIUM SERPL-MCNC: 9.4 MG/DL (ref 8.6–10.3)
CHLORIDE BLD-SCNC: 105 MMOL/L (ref 98–107)
CO2 SERPL-SCNC: 29 MMOL/L (ref 21–31)
COLOR UR AUTO: YELLOW
CREAT SERPL-MCNC: 0.56 MG/DL (ref 0.6–1.2)
EOSINOPHIL # BLD AUTO: 0.1 10E3/UL (ref 0–0.7)
EOSINOPHIL NFR BLD AUTO: 1 %
ERYTHROCYTE [DISTWIDTH] IN BLOOD BY AUTOMATED COUNT: 13.5 % (ref 10–15)
GFR SERPL CREATININE-BSD FRML MDRD: >90 ML/MIN/1.73M2
GLUCOSE BLD-MCNC: 102 MG/DL (ref 70–105)
GLUCOSE UR STRIP-MCNC: NEGATIVE MG/DL
HCT VFR BLD AUTO: 44.8 % (ref 35–47)
HGB BLD-MCNC: 14.7 G/DL (ref 11.7–15.7)
HGB UR QL STRIP: ABNORMAL
HOLD SPECIMEN: NORMAL
IMM GRANULOCYTES # BLD: 0 10E3/UL
IMM GRANULOCYTES NFR BLD: 0 %
KETONES UR STRIP-MCNC: NEGATIVE MG/DL
LEUKOCYTE ESTERASE UR QL STRIP: NEGATIVE
LYMPHOCYTES # BLD AUTO: 1.8 10E3/UL (ref 0.8–5.3)
LYMPHOCYTES NFR BLD AUTO: 20 %
MCH RBC QN AUTO: 29.4 PG (ref 26.5–33)
MCHC RBC AUTO-ENTMCNC: 32.8 G/DL (ref 31.5–36.5)
MCV RBC AUTO: 90 FL (ref 78–100)
MONOCYTES # BLD AUTO: 0.5 10E3/UL (ref 0–1.3)
MONOCYTES NFR BLD AUTO: 6 %
NEUTROPHILS # BLD AUTO: 6.4 10E3/UL (ref 1.6–8.3)
NEUTROPHILS NFR BLD AUTO: 72 %
NITRATE UR QL: NEGATIVE
NRBC # BLD AUTO: 0 10E3/UL
NRBC BLD AUTO-RTO: 0 /100
PH UR STRIP: 5.5 [PH] (ref 5–9)
PLATELET # BLD AUTO: 240 10E3/UL (ref 150–450)
POTASSIUM BLD-SCNC: 4 MMOL/L (ref 3.5–5.1)
PROLACTIN SERPL-MCNC: 20 UG/L (ref 3–27)
RBC # BLD AUTO: 5 10E6/UL (ref 3.8–5.2)
RBC URINE: 3 /HPF
SODIUM SERPL-SCNC: 140 MMOL/L (ref 134–144)
SP GR UR STRIP: 1 (ref 1–1.03)
TSH SERPL DL<=0.005 MIU/L-ACNC: 0.65 MU/L (ref 0.4–4)
UROBILINOGEN UR STRIP-MCNC: NORMAL MG/DL
WBC # BLD AUTO: 8.8 10E3/UL (ref 4–11)
WBC URINE: 1 /HPF

## 2021-08-19 PROCEDURE — 85025 COMPLETE CBC W/AUTO DIFF WBC: CPT | Performed by: EMERGENCY MEDICINE

## 2021-08-19 PROCEDURE — 80048 BASIC METABOLIC PNL TOTAL CA: CPT | Performed by: EMERGENCY MEDICINE

## 2021-08-19 PROCEDURE — 36415 COLL VENOUS BLD VENIPUNCTURE: CPT | Performed by: EMERGENCY MEDICINE

## 2021-08-19 PROCEDURE — 84146 ASSAY OF PROLACTIN: CPT

## 2021-08-19 PROCEDURE — 99283 EMERGENCY DEPT VISIT LOW MDM: CPT | Performed by: EMERGENCY MEDICINE

## 2021-08-19 PROCEDURE — G0463 HOSPITAL OUTPT CLINIC VISIT: HCPCS

## 2021-08-19 PROCEDURE — 99214 OFFICE O/P EST MOD 30 MIN: CPT | Performed by: FAMILY MEDICINE

## 2021-08-19 PROCEDURE — 84443 ASSAY THYROID STIM HORMONE: CPT

## 2021-08-19 PROCEDURE — 81001 URINALYSIS AUTO W/SCOPE: CPT | Performed by: EMERGENCY MEDICINE

## 2021-08-19 PROCEDURE — 99282 EMERGENCY DEPT VISIT SF MDM: CPT | Performed by: EMERGENCY MEDICINE

## 2021-08-19 RX ORDER — ALBUTEROL SULFATE 90 UG/1
1-2 AEROSOL, METERED RESPIRATORY (INHALATION) EVERY 6 HOURS PRN
Qty: 18 G | Refills: 11 | Status: SHIPPED | OUTPATIENT
Start: 2021-08-19 | End: 2022-06-20

## 2021-08-19 ASSESSMENT — ENCOUNTER SYMPTOMS
BACK PAIN: 1
LIGHT-HEADEDNESS: 1
NAUSEA: 0
FEVER: 0
DYSURIA: 0
ARTHRALGIAS: 0
FEVER: 0
CHEST TIGHTNESS: 0
VOMITING: 0
CHILLS: 0
VOMITING: 0
NAUSEA: 0
SHORTNESS OF BREATH: 0
AGITATION: 0
DYSURIA: 0
CHILLS: 1

## 2021-08-19 ASSESSMENT — MIFFLIN-ST. JEOR
SCORE: 1639.26
SCORE: 1637.44

## 2021-08-19 ASSESSMENT — PAIN SCALES - GENERAL: PAINLEVEL: NO PAIN (0)

## 2021-08-19 ASSESSMENT — PATIENT HEALTH QUESTIONNAIRE - PHQ9: SUM OF ALL RESPONSES TO PHQ QUESTIONS 1-9: 0

## 2021-08-19 NOTE — ED TRIAGE NOTES
"ED Nursing Triage Note (General)   ________________________________    Dinagene Barraza is a 46 year old Female that presents to triage private car  With history of  Pt was working at [employer] this morning when she started to feel dizzy and not be able to walk straight. Staff took her blood pressure and found it to be 170s/-. Pt called nurse triage line and was told to come here reported by patient   Significant symptoms had onset at 0630. Pt took home BP medication at 0530.  BP (!) 177/105   Pulse 79   Temp 97.8  F (36.6  C) (Tympanic)   Resp 18   Ht 1.651 m (5' 5\")   Wt 99.3 kg (219 lb)   SpO2 99%   BMI 36.44 kg/m  t  Patient appears alert  and oriented, in no acute distress., and cooperative and pleasant behavior.  GCS Total = 14  Airway: intact  Breathing noted as Normal  Circulation Normal  Skin:  Normal  Action taken:  Roomed 907      PRE HOSPITAL PRIOR LIVING SITUATION Children Only  "

## 2021-08-19 NOTE — ED PROVIDER NOTES
History     Chief Complaint   Patient presents with     Hypertension     HPI  Dina Barraza is a 46 year old female who was at work today feeling dizzy again bumping into things. 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 Patient states that she has been feeling well otherwise except for the fact that she has been having her period for about 3 weeks now. She is having to change her period about 3 or 4 times per day. She has never had any menses this long before. Otherwise has not been feeling ill, eating and drinking normally. No fevers or chills. She has not been vaccinated for Covid because she has multiple allergies and is worried about this and wanted to talk to her doctor first. She says there is no documented cases of Covid at her place of work. She gets tested frequently, her last negative test was just 3 days ago.    Allergies:  Allergies   Allergen Reactions     Ranitidine Hives     Oxycodone Rash     Sulfa Drugs Rash     Sulfamethoxazole-Trimethoprim Rash       Problem List:    Patient Active Problem List    Diagnosis Date Noted     Essential hypertension 07/01/2019     Priority: Medium     Morbid obesity (H) 01/29/2019     Priority: Medium     Anxiety state 01/24/2018     Priority: Medium     Gastroesophageal reflux disease 01/24/2018     Priority: Medium     Obesity 01/24/2018     Priority: Medium     Mild intermittent asthma without complication 02/21/2013     Priority: Medium     Overview:   Illness-induced       Asthma 02/21/2013     Priority: Medium     Formatting of this note might be different from the original.  Illness-induced          Past Medical History:    Past Medical History:   Diagnosis Date     Major depressive disorder, single episode      Mild intermittent asthma, uncomplicated        Past Surgical History:    Past Surgical History:   Procedure Laterality Date     LAPAROSCOPIC CHOLECYSTECTOMY       02     LAPAROSCOPIC TUBAL LIGATION             Family History:    Family History   Problem Relation Age of Onset     Diabetes Mother         Diabetes     Hypertension Mother         Hypertension     Diabetes Father         Diabetes,type 2     Heart Disease Father         Heart Disease, MI, CVA and CABG     Heart Disease Paternal Grandfather         Heart Disease,MI age 60,      Hypertension Maternal Grandmother         Hypertension     Diabetes Maternal Aunt         Diabetes,type 2     Diabetes Maternal Uncle         Diabetes,type 2     Family History Negative Son         Good Health     Family History Negative Son         Good Health,Asthma     Family History Negative Daughter         Good Health,Asthma       Social History:  Marital Status:   [2]  Social History     Tobacco Use     Smoking status: Former Smoker     Packs/day: 0.50     Years: 12.00     Pack years: 6.00     Types: Cigarettes     Quit date: 2018     Years since quitting: 3.6     Smokeless tobacco: Never Used   Substance Use Topics     Alcohol use: Yes     Comment: Alcoholic Drinks/day: rare     Drug use: Not Currently     Types: Other     Comment: Drug use: No        Medications:    albuterol (PROAIR HFA/PROVENTIL HFA/VENTOLIN HFA) 108 (90 BASE) MCG/ACT Inhaler  busPIRone (BUSPAR) 10 MG tablet  lisinopril-hydrochlorothiazide (ZESTORETIC) 10-12.5 MG tablet  LORazepam (ATIVAN) 0.5 MG tablet  sertraline (ZOLOFT) 100 MG tablet          Review of Systems   Constitutional: Positive for chills. Negative for fever.   HENT: Negative for congestion.    Eyes: Negative for visual disturbance.   Respiratory: Negative for chest tightness and shortness of breath.    Cardiovascular: Negative for chest pain.   Gastrointestinal: Negative for nausea and vomiting.   Genitourinary: Negative for dysuria.   Musculoskeletal: Negative for arthralgias.   Skin: Negative for rash.   Neurological: Positive for light-headedness.   Psychiatric/Behavioral:  "Negative for agitation.       Physical Exam   BP: (!) 177/105  Pulse: 79  Temp: 97.8  F (36.6  C)  Resp: 18  Height: 165.1 cm (5' 5\")  Weight: 99.3 kg (219 lb)  SpO2: 99 %      Physical Exam  Vitals and nursing note reviewed.   Constitutional:       Appearance: Normal appearance.   HENT:      Head: Normocephalic and atraumatic.      Mouth/Throat:      Mouth: Mucous membranes are moist.   Eyes:      Conjunctiva/sclera: Conjunctivae normal.   Cardiovascular:      Rate and Rhythm: Normal rate and regular rhythm.      Heart sounds: Normal heart sounds.   Pulmonary:      Effort: Pulmonary effort is normal.      Breath sounds: Normal breath sounds.   Abdominal:      General: Abdomen is flat.   Skin:     General: Skin is warm and dry.   Neurological:      Mental Status: She is alert and oriented to person, place, and time.   Psychiatric:         Behavior: Behavior normal.         ED Course        Procedures                  Results for orders placed or performed during the hospital encounter of 08/19/21 (from the past 24 hour(s))   CBC with platelets differential    Narrative    The following orders were created for panel order CBC with platelets differential.  Procedure                               Abnormality         Status                     ---------                               -----------         ------                     CBC with platelets and d...[[id number]]                      Final result                 Please view results for these tests on the individual orders.   Basic metabolic panel   Result Value Ref Range    Sodium 140 134 - 144 mmol/L    Potassium 4.0 3.5 - 5.1 mmol/L    Chloride 105 98 - 107 mmol/L    Carbon Dioxide (CO2) 29 21 - 31 mmol/L    Anion Gap 6 3 - 14 mmol/L    Urea Nitrogen 8 7 - 25 mg/dL    Creatinine 0.56 (L) 0.60 - 1.20 mg/dL    Calcium 9.4 8.6 - 10.3 mg/dL    Glucose 102 70 - 105 mg/dL    GFR Estimate >90 >60 mL/min/1.73m2   CBC with platelets and differential   Result Value Ref " Range    WBC Count 8.8 4.0 - 11.0 10e3/uL    RBC Count 5.00 3.80 - 5.20 10e6/uL    Hemoglobin 14.7 11.7 - 15.7 g/dL    Hematocrit 44.8 35.0 - 47.0 %    MCV 90 78 - 100 fL    MCH 29.4 26.5 - 33.0 pg    MCHC 32.8 31.5 - 36.5 g/dL    RDW 13.5 10.0 - 15.0 %    Platelet Count 240 150 - 450 10e3/uL    % Neutrophils 72 %    % Lymphocytes 20 %    % Monocytes 6 %    % Eosinophils 1 %    % Basophils 1 %    % Immature Granulocytes 0 %    NRBCs per 100 WBC 0 <1 /100    Absolute Neutrophils 6.4 1.6 - 8.3 10e3/uL    Absolute Lymphocytes 1.8 0.8 - 5.3 10e3/uL    Absolute Monocytes 0.5 0.0 - 1.3 10e3/uL    Absolute Eosinophils 0.1 0.0 - 0.7 10e3/uL    Absolute Basophils 0.1 0.0 - 0.2 10e3/uL    Absolute Immature Granulocytes 0.0 <=0.0 10e3/uL    Absolute NRBCs 0.0 10e3/uL   Extra Tube    Narrative    The following orders were created for panel order Extra Tube.  Procedure                               Abnormality         Status                     ---------                               -----------         ------                     Extra Blue Top Tube[[id number]]                              Final result               Extra Red Top Tube[[id number]]                               In process                 Extra Green Top (Lithium...[[id number]]                      Final result                 Please view results for these tests on the individual orders.   Extra Blue Top Tube   Result Value Ref Range    Hold Specimen JIC    Extra Green Top (Lithium Heparin) ON ICE   Result Value Ref Range    Hold Specimen JIC        Medications - No data to display    Assessments & Plan (with Medical Decision Making)     I have reviewed the nursing notes.    I have reviewed the findings, diagnosis, plan and need for follow up with the patient.  Patient's blood pressure is somewhat improved, at one point it was in the 150/90 range.  Just before discharge it did go back up again to the 160 to 70/100 range.  She is on Zestoretic 10/12.5, and I do believe  she could probably tolerate an extra 1 of these on a as needed basis.  I told her however that if she needs to do this more than once or twice a week she really should talk to her primary provider.  Labs are reassuring, she is not anemic.  Urinalysis still pending, I will call her if this shows any sign of UTI.  She has not been vaccinated for Covid as she is worried about all of her allergies.  I did speak with her about this, also asked the pharmacist to speak to her about this which she did.  Patient feels better about getting the vaccine and plans to do it.  She does not want to do it today because she works tomorrow and has strong reaction to influenza vaccines.  She says she has the weekend off however and I strongly encouraged her to go in tomorrow to get the shot.  Return if worse.    New Prescriptions    No medications on file       Final diagnoses:   Hypertension, unspecified type       8/19/2021   [facility]     Alex Cardoza MD  08/19/21 1013

## 2021-08-19 NOTE — TELEPHONE ENCOUNTER
"S-(situation): 2 concerns: elevated BP and menstration for 3 weeks.     B-(background):   Hypertension with medication prescribed.    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  Pt reports does not take BP meds regularly  But did take meds this morning, which was about an hour  Before blood pressure was taken.   Dull headache 2/10  Lightheadedness/dizzy. Some improvement but still experiencing lightheadedness.  Always has blurred vision but doesn't wear glasses, attributing blurred vision to not wearing glasses.    No c/o CP or SOB.    Menstrual bleeding: continual for the last 3 weeks.   Has not experienced in the past like this.  Usually about 5 days per cycle.    R-(recommendations): per protocol, ED now for concern for hypertension emergency.    Pt agrees, boyfriend gets off of work at 9 and will have him bring her in to the ED.  Advise pt not to drive self for safety reasons. If sx worsen, to call 911 instead of waiting for ride to ED.  Plans to keep the afternoon appt for bleeding concern. If not discharged from ER, will cancel, however pt wants to see clinic provider today for bleeding concern.     Reason for Disposition    Systolic BP >= 160 OR Diastolic >= 100, and any cardiac or neurologic symptoms (e.g., chest pain, difficulty breathing, unsteady gait, blurred vision)    Additional Information    Negative: Pregnant > 20 weeks or postpartum (< 6 weeks after delivery) and new hand or face swelling    Negative: Pregnant > 20 weeks and BP > 140/90    Answer Assessment - Initial Assessment Questions  1. BLOOD PRESSURE: \"What is the blood pressure?\" \"Did you take at least two measurements 5 minutes apart?\"      172/74  2. ONSET: \"When did you take your blood pressure?\"      7 am, approx.   3. HOW: \"How did you obtain the blood pressure?\" (e.g., visiting nurse, automatic home BP monitor)      " "Nurse at work.   4. HISTORY: \"Do you have a history of high blood pressure?\"      Yes.   5. MEDICATIONS: \"Are you taking any medications for blood pressure?\" \"Have you missed any doses recently?\"      Doesn't take regularly   6. OTHER SYMPTOMS: \"Do you have any symptoms?\" (e.g., headache, chest pain, blurred vision, difficulty breathing, weakness)      Headache , dull 2/10      Lightheadedness, felt like was going to pass out. Still a little dizzy but is improving.   7. PREGNANCY: \"Is there any chance you are pregnant?\" \"When was your last menstrual period?\"    Protocols used: HIGH BLOOD PRESSURE-TEJAL-DAPHNIE Neal RN ,....................  8/19/2021   9:04 AM      "

## 2021-08-19 NOTE — DISCHARGE INSTRUCTIONS
If your blood pressure continues to run high like this, in the 170/100 range, it would be okay to take 1 extra blood pressure pill during the day.  If however you need to do this more than once or twice a week, I would definitely talk to your primary provider before continuing this.  I would strongly encourage you to get that Covid vaccine tomorrow as we discussed.

## 2021-08-19 NOTE — PROGRESS NOTES
Assessment & Plan     Dysmenorrhea  CBC within normal limits in emergency department earlier today.  Check TSH and prolactin levels.  Plan to treat as indicated pending results.  Further evaluation with pelvic US.  Encouraged her to take scheduled NSAID medication for next five days.  Follow-up after results available.  - TSH Reflex GH; Future  - Prolactin; Future  - US Pelvic Complete with Transvaginal; Future    Essential hypertension  BP elevated beyond goal < 130/80.  No medication adjustment today.  Reassess on follow-up.    Mild intermittent asthma without complication  Well-controlled with albuterol inhaler as needed.  Refill provided.  - albuterol (PROAIR HFA/PROVENTIL HFA/VENTOLIN HFA) 108 (90 Base) MCG/ACT inhaler; Inhale 1-2 puffs into the lungs every 6 hours as needed for shortness of breath / dyspnea or wheezing    Yue Tiwari, [facility] [place]    Chance Arroyo is a 46 year old who presents for the following health issues:    HPI     She reports that she needs to be better about taking her blood pressure medication.  She has been stressed recently with multiple job changes, a new house, and her daughter's recent marriage and move.    She presents for evaluation of continual vaginal bleeding.  She had a normal menstrual cycle at the end of July which lasted 3-4 days.  Her bleeding stopped for 2-3 days before restarting.  She has had continual bright red bleeding since.  She had to change her overnight pad every few hours when the bleeding first started.  This has lessened somewhat recently but the bleeding has persisted overall.  She has had associated pelvic cramping.  She has tried nothing to treat her symptoms.    She has never had similar symptoms in the past.  Her menstrual cycle has historically been regular, occurring every 28 days and lasting 3-4 days with heavy bleeding the first couple of days and light bleeding thereafter.  She does get associated cramping,  "particularly on the first day.    She has a history of mild intermittent asthma.  She has typically not needed her albuterol inhaler but has had to use it more frequently recently due to the poor air quality.  She needs a refill.    She was recently seen in the emergency department for her elevated blood pressure.  Laboratory work-up was normal.    Review of Systems   Constitutional: Negative for chills and fever.   Gastrointestinal: Negative for nausea and vomiting.   Genitourinary: Negative for dysuria and vaginal discharge.   Musculoskeletal: Positive for back pain.          Objective    BP (!) 156/88 (BP Location: Right arm, Patient Position: Sitting, Cuff Size: Adult Large)   Pulse 69   Temp 99.1  F (37.3  C) (Tympanic)   Resp 18   Ht 1.651 m (5' 5\")   Wt 99.2 kg (218 lb 9.6 oz)   LMP 07/28/2021 (Within Days)   SpO2 99%   Breastfeeding No   BMI 36.38 kg/m    Body mass index is 36.38 kg/m .  Physical Exam  Constitutional:       General: She is not in acute distress.     Appearance: Normal appearance. She is obese. She is not ill-appearing.   Cardiovascular:      Rate and Rhythm: Normal rate and regular rhythm.      Heart sounds: No murmur heard.   No friction rub. No gallop.    Pulmonary:      Effort: Pulmonary effort is normal.      Breath sounds: Normal breath sounds. No wheezing, rhonchi or rales.   Abdominal:      General: Bowel sounds are normal.      Palpations: Abdomen is soft.      Tenderness: There is no abdominal tenderness. There is no guarding or rebound.   Neurological:      Mental Status: She is alert.   Psychiatric:         Mood and Affect: Mood normal.           "

## 2021-08-20 ASSESSMENT — ASTHMA QUESTIONNAIRES: ACT_TOTALSCORE: 18

## 2021-08-23 ENCOUNTER — HOSPITAL ENCOUNTER (OUTPATIENT)
Dept: ULTRASOUND IMAGING | Facility: OTHER | Age: 46
Discharge: HOME | End: 2021-08-23
Attending: FAMILY MEDICINE | Admitting: FAMILY MEDICINE
Payer: COMMERCIAL

## 2021-08-23 DIAGNOSIS — N94.6 DYSMENORRHEA: ICD-10-CM

## 2021-08-23 DIAGNOSIS — J45.20 MILD INTERMITTENT ASTHMA WITHOUT COMPLICATION: ICD-10-CM

## 2021-08-23 DIAGNOSIS — F41.0 PANIC ATTACK: ICD-10-CM

## 2021-08-23 PROCEDURE — 76830 TRANSVAGINAL US NON-OB: CPT

## 2021-08-23 RX ORDER — LORAZEPAM 0.5 MG/1
TABLET ORAL
Qty: 120 TABLET | Refills: 5 | OUTPATIENT
Start: 2021-08-23

## 2021-08-23 NOTE — TELEPHONE ENCOUNTER
LORazepam (ATIVAN) 0.5 MG tablet 120 tablet 5 3/29/2021  No   Sig: TAKE 1-2 TABLETS BY MOUTH DAILY & 2 TABLETS NIGHTLY AS NEEDED FOR ANXIETY/SLEEP   Sent to pharmacy as: LORazepam 0.5 MG Oral Tablet (ATIVAN)     To [facility].    Refill request to soon.    Vidya Floyd RN on 8/23/2021 at 3:37 PM

## 2021-08-24 NOTE — TELEPHONE ENCOUNTER
[id number] ([place] of [facility] sent Rx request for the following:      beclomethasone HFA (QVAR REDIHALER) 40 MCG/ACT inhaler (Discontinued) 1 Inhaler 11 1/29/2019 4/13/2020 --   Sig - Route: Inhale 1 puff into the lungs 2 times daily - Inhalation     Per note from pharmacy: PT REQUESTED A REFILL ON THIS MED PLEASE SEND A NEW RX IFAPPROPRIATE THANK YOU    LOV: 8/19/21    Unable to complete prescription refill per RN Medication Refill Policy. Shannan Cortez RN .............. 8/24/2021  11:07 AM

## 2021-08-25 ENCOUNTER — TELEPHONE (OUTPATIENT)
Dept: FAMILY MEDICINE | Facility: OTHER | Age: 46
End: 2021-08-25

## 2021-08-25 NOTE — TELEPHONE ENCOUNTER
Patient states she viewed her ultra sound results on my chart. She is not sure what they mean and wondering what to do next or what the plan is.  Екатерина Burgos LPN .............8/25/2021     3:01 PM

## 2021-08-25 NOTE — TELEPHONE ENCOUNTER
Pt would like a call back about results from US from [facility].  Please call    Yariel Mello on 8/25/2021 at 11:47 AM

## 2021-08-26 DIAGNOSIS — J45.20 MILD INTERMITTENT ASTHMA WITHOUT COMPLICATION: ICD-10-CM

## 2021-08-26 RX ORDER — BECLOMETHASONE DIPROPIONATE HFA 40 UG/1
AEROSOL, METERED RESPIRATORY (INHALATION)
Qty: 10.6 G | Refills: 0 | OUTPATIENT
Start: 2021-08-26

## 2021-08-26 RX ORDER — BECLOMETHASONE DIPROPIONATE HFA 40 UG/1
AEROSOL, METERED RESPIRATORY (INHALATION)
Qty: 10.6 G | Refills: 0 | Status: SHIPPED | OUTPATIENT
Start: 2021-08-26 | End: 2021-09-26

## 2021-08-26 NOTE — TELEPHONE ENCOUNTER
This medication was removed from her list.  One inhaler sent; however, she will need to be seen for further fills of this medicine.

## 2021-09-01 ENCOUNTER — OFFICE VISIT (OUTPATIENT)
Dept: OBGYN | Facility: OTHER | Age: 46
End: 2021-09-01
Attending: FAMILY MEDICINE
Payer: COMMERCIAL

## 2021-09-01 VITALS
WEIGHT: 219.9 LBS | SYSTOLIC BLOOD PRESSURE: 130 MMHG | BODY MASS INDEX: 36.59 KG/M2 | DIASTOLIC BLOOD PRESSURE: 86 MMHG | HEART RATE: 82 BPM

## 2021-09-01 DIAGNOSIS — M99.41 CONNECTIVE TISSUE STENOSIS OF NEURAL CANAL OF CERVICAL REGION: ICD-10-CM

## 2021-09-01 DIAGNOSIS — N93.9 ABNORMAL UTERINE BLEEDING: ICD-10-CM

## 2021-09-01 DIAGNOSIS — Z12.4 CERVICAL CANCER SCREENING: ICD-10-CM

## 2021-09-01 DIAGNOSIS — N83.8 OVARIAN MASS: Primary | ICD-10-CM

## 2021-09-01 LAB
CANCER AG125 SERPL-ACNC: 13 U/ML (ref 0–35)
HCG UR QL: NEGATIVE
LDH SERPL L TO P-CCNC: 169 U/L (ref 140–271)

## 2021-09-01 PROCEDURE — 81025 URINE PREGNANCY TEST: CPT | Mod: ZL | Performed by: STUDENT IN AN ORGANIZED HEALTH CARE EDUCATION/TRAINING PROGRAM

## 2021-09-01 PROCEDURE — 86305 HUMAN EPIDIDYMIS PROTEIN 4: CPT | Mod: ZL | Performed by: STUDENT IN AN ORGANIZED HEALTH CARE EDUCATION/TRAINING PROGRAM

## 2021-09-01 PROCEDURE — 36415 COLL VENOUS BLD VENIPUNCTURE: CPT | Mod: ZL | Performed by: STUDENT IN AN ORGANIZED HEALTH CARE EDUCATION/TRAINING PROGRAM

## 2021-09-01 PROCEDURE — G0123 SCREEN CERV/VAG THIN LAYER: HCPCS | Performed by: STUDENT IN AN ORGANIZED HEALTH CARE EDUCATION/TRAINING PROGRAM

## 2021-09-01 PROCEDURE — 86304 IMMUNOASSAY TUMOR CA 125: CPT | Mod: ZL | Performed by: STUDENT IN AN ORGANIZED HEALTH CARE EDUCATION/TRAINING PROGRAM

## 2021-09-01 PROCEDURE — 84999 UNLISTED CHEMISTRY PROCEDURE: CPT | Mod: ZL | Performed by: STUDENT IN AN ORGANIZED HEALTH CARE EDUCATION/TRAINING PROGRAM

## 2021-09-01 PROCEDURE — G0463 HOSPITAL OUTPT CLINIC VISIT: HCPCS | Mod: 25 | Performed by: STUDENT IN AN ORGANIZED HEALTH CARE EDUCATION/TRAINING PROGRAM

## 2021-09-01 PROCEDURE — 11981 INSERTION DRUG DLVR IMPLANT: CPT | Performed by: STUDENT IN AN ORGANIZED HEALTH CARE EDUCATION/TRAINING PROGRAM

## 2021-09-01 PROCEDURE — 86336 INHIBIN A: CPT | Mod: ZL | Performed by: STUDENT IN AN ORGANIZED HEALTH CARE EDUCATION/TRAINING PROGRAM

## 2021-09-01 PROCEDURE — 99205 OFFICE O/P NEW HI 60 MIN: CPT | Mod: 25 | Performed by: STUDENT IN AN ORGANIZED HEALTH CARE EDUCATION/TRAINING PROGRAM

## 2021-09-01 PROCEDURE — 250N000011 HC RX IP 250 OP 636: Performed by: STUDENT IN AN ORGANIZED HEALTH CARE EDUCATION/TRAINING PROGRAM

## 2021-09-01 PROCEDURE — 87624 HPV HI-RISK TYP POOLED RSLT: CPT | Mod: ZL | Performed by: STUDENT IN AN ORGANIZED HEALTH CARE EDUCATION/TRAINING PROGRAM

## 2021-09-01 PROCEDURE — 58100 BIOPSY OF UTERUS LINING: CPT | Performed by: STUDENT IN AN ORGANIZED HEALTH CARE EDUCATION/TRAINING PROGRAM

## 2021-09-01 PROCEDURE — 83615 LACTATE (LD) (LDH) ENZYME: CPT | Mod: ZL | Performed by: STUDENT IN AN ORGANIZED HEALTH CARE EDUCATION/TRAINING PROGRAM

## 2021-09-01 PROCEDURE — 82105 ALPHA-FETOPROTEIN SERUM: CPT | Mod: ZL | Performed by: STUDENT IN AN ORGANIZED HEALTH CARE EDUCATION/TRAINING PROGRAM

## 2021-09-01 PROCEDURE — 88305 TISSUE EXAM BY PATHOLOGIST: CPT

## 2021-09-01 RX ADMIN — ETONOGESTREL 68 MG: 68 IMPLANT SUBCUTANEOUS at 15:39

## 2021-09-01 ASSESSMENT — PAIN SCALES - GENERAL: PAINLEVEL: NO PAIN (0)

## 2021-09-01 NOTE — PROGRESS NOTES
Gynecology Office Visit    Chief Complaint: Abnormal uterine bleeding, Ovarian mass    HPI:    Dina Barraza is a 46 year old , here for discussion of 2 things.  The first concern she brought up is having history of very heavy periods. Her periods have been heavy for 3-5 days and the first two days were historically really heavy. They would come every month. She had her most recent period that lasted 3 weeks. It came like normal, went away for a few days and then came back. The bleeding stopped approximately 1.5 weeks ago.     Few weeks ago she had a pelvic ultrasound which showed a normal-sized uterus with heterogenous myometrial and endometrial junction.  Possibly suggestive of adenomyosis.  She notes that around her menses each month she notices significant abdominal pressure and pain as well.  She notes that around that time of the month as well it tends to be more pain with intercourse.    She denies any feelings of lightheadedness, dizziness, syncope.  She is not currently using any hormonal form of menses control.  In the past she has tried using Depo-Provera prior to having children and notes that she tolerated this well.     Her second concern today is regarding the adnexal mass noted on ultrasound.  A 3.5 cm heterogenous appearing mass is noted on the left ovary.  She denies any pain along the left side and is unsure how long this may or may not have been present.  She has no history of prior ovarian cysts.    She denies any significant nausea, vomiting, changes in appetite, weight loss, fevers, night sweats, bloating.      OBHx  OB History    Para Term  AB Living   3 0 0 0 0 3   SAB TAB Ectopic Multiple Live Births   0 0 0 0 0      x3 at full term  Pelvis proven to 9 lb 15oz    GYN history:   No history of STIs  Last pap smear: Dec 2015 NIL, HPV negative   Menses occur q approximately 30 days and usually last 5-7 days.  The first 2 days of each.  Are typically very heavy.  This  past month was the first time in which she had an irregular bleeding pattern.    She denies any history of hot flashes, night sweats, loss of focus, or other menopausal type coexisting symptoms.  She is not sure how old her mother was when she underwent menopause as her mother had a hysterectomy in her late 20s.  Her sister has also had history of an early hysterectomy and is unsure if she has reached menopause.    Past medical history:  Past Medical History:   Diagnosis Date     Major depressive disorder, single episode     During an unhealthy past marriage     Mild intermittent asthma, uncomplicated     Mild intermittent asthma     Specifically denies VTE, DM, HTN or bleeding disorders    Past Surgical History:  Past Surgical History:   Procedure Laterality Date     LAPAROSCOPIC CHOLECYSTECTOMY      08/09/02     LAPAROSCOPIC TUBAL LIGATION      2005         Medications:  Current Outpatient Medications   Medication     albuterol (PROAIR HFA/PROVENTIL HFA/VENTOLIN HFA) 108 (90 Base) MCG/ACT inhaler     busPIRone (BUSPAR) 10 MG tablet     lisinopril-hydrochlorothiazide (ZESTORETIC) 10-12.5 MG tablet     LORazepam (ATIVAN) 0.5 MG tablet     QVAR REDIHALER 40 MCG/ACT inhaler     sertraline (ZOLOFT) 100 MG tablet     No current facility-administered medications for this visit.       Allergies:       Allergies   Allergen Reactions     Ranitidine Hives     Oxycodone Rash     Sulfa Drugs Rash     Sulfamethoxazole-Trimethoprim Rash       Social History:  Social History     Tobacco Use     Smoking status: Current Every Day Smoker     Packs/day: 0.75     Years: 12.00     Pack years: 9.00     Types: Cigarettes     Last attempt to quit: 1/1/2018     Years since quitting: 3.6     Smokeless tobacco: Never Used   Vaping Use     Vaping Use: Never used   Substance Use Topics     Alcohol use: Not Currently     Comment: Alcoholic Drinks/day: rare     Drug use: Not Currently       Family History:  Family History   Problem Relation Age  of Onset     Diabetes Mother         Diabetes     Hypertension Mother         Hypertension     Diabetes Father         Diabetes,type 2     Heart Disease Father         Heart Disease, MI, CVA and CABG     Heart Disease Paternal Grandfather         Heart Disease,MI age 60,      Hypertension Maternal Grandmother         Hypertension     Diabetes Maternal Aunt         Diabetes,type 2     Diabetes Maternal Uncle         Diabetes,type 2     Family History Negative Son         Good Health     Family History Negative Son         Good Health,Asthma     Family History Negative Daughter         Good Health,Asthma     Specifically denies breast, ovarian, colon, pancreatic cancers  Specifically denies VTE, known familial thrombophilias and coagulopathies    ROS:   Respiratory: No shortness of breath, dyspnea on exertion, cough, or hemoptysis  Cardiovascular: negative for palpitations, chest pain, lower extremity edema and syncope or near-syncope  Gastrointestinal: negative for, nausea, vomiting and hematemesis  Genitourinary: negative for, dysuria, frequency and urgency  Musculoskeletal: negative for, back pain and muscular weakness  Psychiatric: negative for, anxiety, depression and hallucinations  Hematologic/Lymphatic/Immunologic: negative for, anemia, chills and fever      Physical Exam  /86   Pulse 82   Wt 99.7 kg (219 lb 14.4 oz)   LMP 2021   Breastfeeding No   BMI 36.59 kg/m    Gen: Well-appearing, no acute distressed, well-groomed, alert  HEENT: Normocephalic, atraumatic  Cardiovascular: Regular rate. No peripheral edema, normal peripheral circulation  Pulm: Symmetric chest rise, non-labored respirations  Abd: Soft, non-tender, non-distended  Ext: No LE edema, extremities warm and well perfused  Pelvic:  Normal appearing external female genitalia. Normal hair distribution. Vagina is without lesions with moist, pink ruggae. There is no vaginaldischarge. Cervix parous, no lesions, no cervical motion  tenderness. Pap smear collected. Uterus is approximately 8cm, mobile, mildly tender at fundus. No adnexal tenderness. I could not definitively palpate the ovarian cyst due to body habitus.    Endometrial Biopsy Note:   We discussed the risks, benefits and alternatives to the procedure and Ms. Barraza was agreeable to proceed with the EMB. Consents were signed.  A bimanual exam was performed to reveal an anteverted uterus.  She was assisted into a lithotomy position and a speculum was gently inserted to provide visualization of the cervix.  The cervix was clearly visualized.  The cervix was cleaned with 3 Betadine swabs. The cervix was slightly dilated as she had just been bleeding and no tenaculum or os finder was needed. The biopsy cannula was then gently inserted through the already dilated cervix and advanced slowly to the uterine fundus.  The plunger was pulled and with suction applied a sample was collected on removal of the cannula in a twisting manner.  The tissue was placed in a specimen cup.  A second pass was performed in a similar manner to complete the tissue collection.    Nexplanon insertion  Pre-operative diagnosis: Desires contraception  Post-operative diagnosis: Status post insertion of Nexplanon  Procedure Performed: Insertion of Nexplanon  Provider: Ghada Cortés MD     Procedure: The risks of pain, bleeding, scarring, infection, swelling and bruising have been discussed and written informed consent was obtained. A time-out was completed verifying correct patient, procedure, site, positioning, and implant in the beginning of this procedure. The site was cleansed with betadine. The area was then infiltrated with 1% Lidocaine until the patient reported adequate anesthesia. The Nexplanon applicator device was checked for presence of the implant and confirmed. The trocar was inserted, implant was deployed, and trocar was removed. Implant confirmed in arm. Sterile pressure dressing  applied.    Complications: None  Site of Insertion: Left    LOT: [id number]   EXP: 2023       Assessment/Plan  Dina Barraza is a 46 year old  female here for abnormal uterine bleeding, ovarian mass noted on US.    # Adnexal mass: noted to be 3.5 cm on US on left ovary, heterogenous in appearance. Potentially blood clot from old corpus luteum.   - Tumor markers collected today to ensure no elevations              -              -HE4              -AFP             - Hcg not collected as Hcg quant negative for EMB procedure              -Inhibin              -LDH  - Plan for serial surveillance US in 2 months to assess for follow up changes.  - No free fluid noted in the pelvis    # AUB  -- EMB collected today  -- US shows possible adenomyosis  -- Nexplanon inserted without complications  -- Follow up in 2 months to assess bleeding profile/repeat US    # Cervical cancer screening  -- Pap smear collected today with HPV co-testing        Counseling and MDM:  We discussed the workup of adnexal cysts in detail. We discussed that ovarian cysts or adnexal masses can be from a variety of etiologies: both benign and malignant. We talked about the warning signs on imaging that can be more concerning for cancer including cyst septations, cyst nodularity, abdominal ascites, complex appearing cysts, any other signs on pelvic or abdominal imaging that is suspicious for malignant spread, or significant change in the cyst after close interval follow up (size or complexity). Characteristics of ovarian cysts that are more suggestive of benign diseases include simple appearing cysts, thin-walls, no sign of ascites, cyst mobility on exam and during US with gentle push from the US probe if possible.     Ovarian cysts can resolve on their own, rupture, or continue to grow in size. If the cyst is simple in appearance they can be followed with close-interval surveillance in 4-8 weeks to assess for changes. If the cyst grows,  or shows any of the concerning changes, the recommendation would be to proceed with surgery for removal. We discussed that one potential risk of waiting much longer if the cyst is the same at interval follow up would be risk of oviaran torsion    We talked about how if there are any concerning characteristics for malignancy on US, these characteristics can be paired with collection of lab tumor markers. These tumor markers can be helpful if they are very elevated from baseline values, and more suggestive that this mass may be a cancer, however especially in pre-menopausal women they can be slightly elevated for other reasons and not necessarily correlate with malignancy of an ovarian cyst. Utilizing the  and HE4 to calcuate a TOMA score can be an additional tool to differentiate malignancy risk. We discussed that each of these things can be markers to help suggest benign vs. Malignant, but they are not diagnostic and not 100% in finding malignancies. The only way to know for sure is to get a tissue sample and complete pathology examination.     With use of the US and tumor markers, we can plan for the next best steps for her care. If there is concern for malignancy, we discussed that it would be best for her to meet with a gyn oncology team to be able to get additional intra-operative frozen pathology and further surgery (lymph nodes etc) if necessary. If the US and tumor markers are suggestive of benign disease we can plan for surgery here.      In terms of her abnormal uterine bleeding we discussed that heavy menstrual bleeding can be caused by a variety of etiologies including uterine leiomyomas, adenomyosis, perimenopausal changes in cycle length and duration of bleeding, uterine polyp, endometrial hyperplasia, pre-cancerous changes or cancerous changes to the uterus. As these changes are occurring and she is >45 years old, it is recommended to get a sampling of the endometrial lining to ensure there are  none of these pre-cancerous or cancerous changes. We discussed an EMB procedure and she was in agreement to do this today. Will follow up pathology results from this.    Treatment options for heavy menstrual bleeding can range from initial conservative measures including using over the counter NSAIDS with each period to reduce bleeding and discomfort, hormonal control of each cycle with combined estrogen-progestin oral contraceptives, a long-standing hormonal control with a levonorgestrel IUD, to even more invasive techniques including an endometrial ablation or definitive management with a hysterectomy. We talked about the risks and benefits of each, and suggested trying one of the less invasive techniques first to see if this improves symptoms. She is NOT a candidate for estrogen-containing options as she has a BMI of 36 and is >40 years old.    We talked about how after endometrial ablations, while they are often very successful in creating amenorrhea, one thing to consider is that it can be difficult to detect any potential cancerous changes in the endometrium in the future as the scarring sometimes does not allow postmenopausal bleeding to escape.     She has not yet tried anything and would like to try the Nexplanon.    We discussed Nexplanon in detail including that it is a long-acting birth control that lasts up to 3 years and is over 99% effective. Benefits include better compliance, lower typical-use pregnancy rates, and decreased menstrual bleeding. The most common side effects are minor reactions such as bleeding, bruising, or swelling of the insertion site. Other side effects include possible unpredictable bleeding, breast tenderness, or worsening of depression or acne. Pregnancy or expulsion of the device are extremely rare. Over a 3-month period, 22% of Nexplanon users will experience absence of all bleeding/spotting, while the remainder experience some form of unscheduled bleeding. 6-23% of women  discontinued use because of bleeding issues. A favorable bleeding pattern within the first 3 months appears to predict a continued favorable pattern during the remainder of use, whereas those with unfavorable patterns have a 50% chance of improving.      Total amount of time spent during today's encounter with chart review, face to face, counseling, procedures and documentation was 75 minutes. 10 minutes were dedicated to the procedures    AVELINA COOPER MD on 9/1/2021 at 2:53 PM

## 2021-09-01 NOTE — NURSING NOTE
Pt presents to clinic today for consult on Ovarian cyst and pain with intercourse.      Medication Reconciliation: complete  Ellen Conklin LPN

## 2021-09-02 LAB
AFP SERPL-MCNC: 1.7 UG/L (ref 0–8)
Lab: NORMAL
PERFORMING LABORATORY: NORMAL
SPECIMEN STATUS: NORMAL
TEST NAME: NORMAL

## 2021-09-02 NOTE — TELEPHONE ENCOUNTER
Patient states that Albuterol was refilled at her recent visit with Dr Tiwari but she doesn't use that one because it doesn't work well. She uses the QVAR on a PRN basis.   She will call for an appointment when she starts to run low on that.  Kajal De Oliveira CMA([facility])..................9/2/2021   2:34 PM

## 2021-09-03 LAB
PATH REPORT.COMMENTS IMP SPEC: NORMAL
PATH REPORT.FINAL DX SPEC: NORMAL
PHOTO IMAGE: NORMAL

## 2021-09-07 LAB
BKR LAB AP GYN ADEQUACY: NORMAL
BKR LAB AP GYN INTERPRETATION: NORMAL
BKR LAB AP HPV REFLEX: NORMAL
BKR LAB AP PREVIOUS ABNORMAL: NORMAL
HUMAN PAPILLOMA VIRUS 16 DNA: NEGATIVE
HUMAN PAPILLOMA VIRUS 18 DNA: NEGATIVE
HUMAN PAPILLOMA VIRUS FINAL DIAGNOSIS: ABNORMAL
HUMAN PAPILLOMA VIRUS OTHER HR: POSITIVE
PATH REPORT.RELEVANT HX SPEC: NORMAL

## 2021-09-08 LAB
INHIBIN A SERPL-MCNC: 40 PG/ML
INHIBIN B SERPL IA-MCNC: 23 PG/ML

## 2021-09-23 DIAGNOSIS — F41.0 PANIC ATTACK: ICD-10-CM

## 2021-09-24 NOTE — TELEPHONE ENCOUNTER
[id number] sent Rx request for the following:   LORazepam (ATIVAN) 0.5 MG tablet  SigTAKE 1-2 TABLETS BY MOUTH DAILY AND 2 TABLETS NIGHTLY AS NEEDED FOR ANXIETY/SLEEP    Last Prescription Date:   03/29/2021  Last Fill Qty/Refills:         120, R-5    Last Office Visit:              08/19/2021 (Karie)   Future Office visit:           None noted    Routing refill request to provider for review/approval because:  Drug not on the [facility] refill protocol or controlled substance    Unable to complete prescription refill per RN Medication Refill Policy.................... Lucy Black RN ....................  9/24/2021   9:34 AM

## 2021-09-26 ENCOUNTER — MYC REFILL (OUTPATIENT)
Dept: FAMILY MEDICINE | Facility: OTHER | Age: 46
End: 2021-09-26

## 2021-09-26 DIAGNOSIS — J45.20 MILD INTERMITTENT ASTHMA WITHOUT COMPLICATION: ICD-10-CM

## 2021-09-27 RX ORDER — LORAZEPAM 0.5 MG/1
TABLET ORAL
Qty: 120 TABLET | Refills: 4 | Status: SHIPPED | OUTPATIENT
Start: 2021-09-27 | End: 2022-01-24

## 2021-10-05 RX ORDER — BECLOMETHASONE DIPROPIONATE HFA 40 UG/1
1 AEROSOL, METERED RESPIRATORY (INHALATION) 2 TIMES DAILY
Qty: 10.6 G | Refills: 11 | Status: SHIPPED | OUTPATIENT
Start: 2021-10-05 | End: 2022-06-20

## 2021-10-05 NOTE — TELEPHONE ENCOUNTER
[facility] the following:   beclomethasone HFA (QVAR REDIHALER) 40 MCG/ACT inhaler    Last Prescription Date:   08/26/2021  Last Fill Qty/Refills:         10.6 g, R-0    Last Office Visit:              08/19/2021 (Karie)   Future Office visit:           None noted   Inhaled Steroids Protocol Failed - 9/26/2021 12:35 PM        Failed - Asthma control assessment score within normal limits in last 6 months     Please review ACT score.        Unable to complete prescription refill per RN Medication Refill Policy.................... Lucy Black RN ....................  10/5/2021   8:37 AM

## 2021-10-09 ENCOUNTER — HEALTH MAINTENANCE LETTER (OUTPATIENT)
Age: 46
End: 2021-10-09

## 2021-10-19 LAB — MAYO MISC RESULT: NORMAL

## 2021-11-03 ENCOUNTER — HOSPITAL ENCOUNTER (OUTPATIENT)
Dept: ULTRASOUND IMAGING | Facility: OTHER | Age: 46
End: 2021-11-03
Attending: STUDENT IN AN ORGANIZED HEALTH CARE EDUCATION/TRAINING PROGRAM
Payer: COMMERCIAL

## 2021-11-03 ENCOUNTER — OFFICE VISIT (OUTPATIENT)
Dept: OBGYN | Facility: OTHER | Age: 46
End: 2021-11-03
Attending: STUDENT IN AN ORGANIZED HEALTH CARE EDUCATION/TRAINING PROGRAM
Payer: COMMERCIAL

## 2021-11-03 VITALS
SYSTOLIC BLOOD PRESSURE: 134 MMHG | BODY MASS INDEX: 36.61 KG/M2 | WEIGHT: 220 LBS | DIASTOLIC BLOOD PRESSURE: 86 MMHG | HEART RATE: 88 BPM

## 2021-11-03 DIAGNOSIS — N93.9 ABNORMAL UTERINE BLEEDING: Primary | ICD-10-CM

## 2021-11-03 DIAGNOSIS — N83.8 OVARIAN MASS: ICD-10-CM

## 2021-11-03 PROCEDURE — 99215 OFFICE O/P EST HI 40 MIN: CPT | Performed by: STUDENT IN AN ORGANIZED HEALTH CARE EDUCATION/TRAINING PROGRAM

## 2021-11-03 PROCEDURE — G0463 HOSPITAL OUTPT CLINIC VISIT: HCPCS | Mod: 25 | Performed by: STUDENT IN AN ORGANIZED HEALTH CARE EDUCATION/TRAINING PROGRAM

## 2021-11-03 PROCEDURE — 76830 TRANSVAGINAL US NON-OB: CPT

## 2021-11-03 RX ORDER — NORETHINDRONE ACETATE 5 MG
10 TABLET ORAL DAILY
Qty: 10 TABLET | Refills: 0 | Status: SHIPPED | OUTPATIENT
Start: 2021-11-03 | End: 2022-03-30

## 2021-11-03 NOTE — PROGRESS NOTES
Follow-Up Visit    S: Ms. Dina Barraza is a 46 year old  here for follow up of abnormal uterine bleeding and adnexal mass noted on left ovary. She has had abnormal bleeding since placement of the Nexplanon. The bleeding has been much lighter, but is more frequent. She is unsure if she likes this and would like to discuss other options.     We reviewed the [facility] contraception table with all appropriate options and efficacies.   In reviewing her medical history to ensure combined-hormonal contraceptive options are safe for her:  She does not have hypertension. BP today was 134/86 mmHg  She does not have a have history of migraine with aura  She has never had any VTE  She does not know of any familial thrombophilias  She has never had a personal history of breast or endometrial cancer  She currently smokes 1/4 pack each day.  As she smokes and is >35 years, we discussed using progestin-only options and trying to avoid estrogen contianing options. She was in agreement.      O:  /86   Pulse 88   Wt 99.8 kg (220 lb)   LMP 2021   Breastfeeding No   BMI 36.61 kg/m      Gen: Well-appearing, NAD  Pulm: nonlabored  Abd: Soft, non-tender, non-distended  Ext: No LE edema, extremities warm and well perfused  Pelvic:  Deferred as she notes she is feeling well currently    US from today:  FINDINGS: Uterus measures 8.4 x 5.3 x 4.2 cm. Uterus is somewhat  heterogeneous in echotexture with more focal iso to hypoechoic 2.3 cm  lesion adjacent to the posterior endometrium. This is eccentric to the  right. This is probably a focal fibroid.     Endometrial stripe measures 6 mm.     Right ovary measures 3.7 x 3.0 x 2.1 cm and contains a 1.8 cm dominant  follicle. There is also a 1.7 cm iso to hyperechoic area in the right  ovary, possibly a corpus luteum. It was not seen on the prior exam as  the right ovary was not well-seen.     Left ovary measures 3.2 x 2.7 x 1.2 cm. No left-sided cyst is seen. No  free fluid is  seen.                                                                    IMPRESSION: Resolution of previously seen 3.1 cm left ovarian cyst      Assessment/Plan  Dina Barraza is a 46 year old  female here for abnormal uterine bleeding, ovarian mass noted on US.     # Adnexal mass: noted to be 3.5 cm on US on left ovary, heterogenous in appearance. Potentially blood clot from old corpus luteum.   - Tumor markers: all suggest low-risk/normal values              -- 13              -HE4- 59              -AFP- 1.7                         - Hcg not collected as Hcg quant negative for EMB procedure              -Inhibin A- 40   -Inhibin B- 23              -LDH- 169  - Serial US from 11/3: left lesion resolved-- now noted 1.7 cm on right-- will continue to follow with 4 week interval US  - No free fluid noted in the pelvis     # AUB  -- EMB collected : benign endometrium, negative for chronic endometritis  -- US shows possible adenomyosis  -- Nexplanon inserted without complications on -- Will trial 10 mg provera for 10 days to stop bleeding and reset, not a candidate for estrogen-containing options as she is a current every day smoker.      # Cervical cancer screening  -- Pap: NILM, HPV HR+ (non 16, 18)   Pap history: NILM/HPV negative , NILM/HPV negative    LSIL in   -- Plan for pap with HPV co-testing in 1 year-- Due 2022    Total amount of time spent during today's visit including chart prep, face to face time and documentation was 45 minutes  AVELINA COOPER MD on 11/3/2021 at 5:08 PM

## 2022-01-21 DIAGNOSIS — F41.0 PANIC ATTACK: ICD-10-CM

## 2022-01-24 RX ORDER — LORAZEPAM 0.5 MG/1
TABLET ORAL
Qty: 120 TABLET | Refills: 3 | Status: SHIPPED | OUTPATIENT
Start: 2022-01-24 | End: 2022-05-18

## 2022-01-24 NOTE — TELEPHONE ENCOUNTER
[id number] sent Rx request for the following:   LORazepam (ATIVAN) 0.5 MG tablet  SigTAKE 1-2 TABLETS BY MOUTH DAILY AND 2 TABLETS NIGHTLYAS NEEDED FOR ANXIETY/SLEEP    Last Prescription Date:   09/27/2021  Last Fill Qty/Refills:         120, R-4    Last Office Visit:              08/19/2021 (Karie)   Future Office visit:           None noted    Routing refill request to provider for review/approval because:  Drug not on the [facility] or [facility] refill protocol or controlled substance    Unable to complete prescription refill per RN Medication Refill Policy.................... Lucy Black RN ....................  1/24/2022   9:15 AM

## 2022-01-29 ENCOUNTER — HEALTH MAINTENANCE LETTER (OUTPATIENT)
Age: 47
End: 2022-01-29

## 2022-03-28 ENCOUNTER — TELEPHONE (OUTPATIENT)
Dept: OBGYN | Facility: OTHER | Age: 47
End: 2022-03-28
Payer: COMMERCIAL

## 2022-03-28 NOTE — TELEPHONE ENCOUNTER
BEHZAD . Patient wants to make Dr. Wick aware she is getting her nexplanon removed on 3/30.    Evleyn Hernandez on 3/28/2022 at 9:40 AM

## 2022-03-30 ENCOUNTER — OFFICE VISIT (OUTPATIENT)
Dept: FAMILY MEDICINE | Facility: OTHER | Age: 47
End: 2022-03-30
Attending: FAMILY MEDICINE
Payer: COMMERCIAL

## 2022-03-30 VITALS
OXYGEN SATURATION: 97 % | TEMPERATURE: 97.5 F | RESPIRATION RATE: 16 BRPM | SYSTOLIC BLOOD PRESSURE: 120 MMHG | WEIGHT: 227 LBS | HEIGHT: 65 IN | DIASTOLIC BLOOD PRESSURE: 78 MMHG | HEART RATE: 62 BPM | BODY MASS INDEX: 37.82 KG/M2

## 2022-03-30 DIAGNOSIS — I10 ESSENTIAL HYPERTENSION: ICD-10-CM

## 2022-03-30 DIAGNOSIS — F41.1 ANXIETY STATE: ICD-10-CM

## 2022-03-30 DIAGNOSIS — Z30.46 ENCOUNTER FOR NEXPLANON REMOVAL: ICD-10-CM

## 2022-03-30 DIAGNOSIS — N93.9 ABNORMAL UTERINE BLEEDING: Primary | ICD-10-CM

## 2022-03-30 DIAGNOSIS — Z72.0 TOBACCO ABUSE: ICD-10-CM

## 2022-03-30 PROCEDURE — G0463 HOSPITAL OUTPT CLINIC VISIT: HCPCS | Mod: 25

## 2022-03-30 PROCEDURE — 11976 REMOVE CONTRACEPTIVE CAPSULE: CPT | Performed by: FAMILY MEDICINE

## 2022-03-30 PROCEDURE — G0463 HOSPITAL OUTPT CLINIC VISIT: HCPCS

## 2022-03-30 PROCEDURE — 99215 OFFICE O/P EST HI 40 MIN: CPT | Mod: 25 | Performed by: FAMILY MEDICINE

## 2022-03-30 RX ORDER — NORETHINDRONE ACETATE 5 MG
10 TABLET ORAL DAILY
Qty: 10 TABLET | Refills: 0 | Status: SHIPPED | OUTPATIENT
Start: 2022-03-30 | End: 2023-02-27

## 2022-03-30 RX ORDER — BUPROPION HYDROCHLORIDE 150 MG/1
150 TABLET ORAL EVERY MORNING
Qty: 90 TABLET | Refills: 0 | Status: SHIPPED | OUTPATIENT
Start: 2022-03-30 | End: 2022-06-20

## 2022-03-30 ASSESSMENT — ANXIETY QUESTIONNAIRES
GAD7 TOTAL SCORE: 8
7. FEELING AFRAID AS IF SOMETHING AWFUL MIGHT HAPPEN: SEVERAL DAYS
3. WORRYING TOO MUCH ABOUT DIFFERENT THINGS: SEVERAL DAYS
1. FEELING NERVOUS, ANXIOUS, OR ON EDGE: SEVERAL DAYS
GAD7 TOTAL SCORE: 8
6. BECOMING EASILY ANNOYED OR IRRITABLE: MORE THAN HALF THE DAYS
5. BEING SO RESTLESS THAT IT IS HARD TO SIT STILL: SEVERAL DAYS
7. FEELING AFRAID AS IF SOMETHING AWFUL MIGHT HAPPEN: SEVERAL DAYS
GAD7 TOTAL SCORE: 8
2. NOT BEING ABLE TO STOP OR CONTROL WORRYING: SEVERAL DAYS
4. TROUBLE RELAXING: SEVERAL DAYS

## 2022-03-30 ASSESSMENT — PATIENT HEALTH QUESTIONNAIRE - PHQ9
SUM OF ALL RESPONSES TO PHQ QUESTIONS 1-9: 0
10. IF YOU CHECKED OFF ANY PROBLEMS, HOW DIFFICULT HAVE THESE PROBLEMS MADE IT FOR YOU TO DO YOUR WORK, TAKE CARE OF THINGS AT HOME, OR GET ALONG WITH OTHER PEOPLE: NOT DIFFICULT AT ALL
SUM OF ALL RESPONSES TO PHQ QUESTIONS 1-9: 0

## 2022-03-30 ASSESSMENT — PAIN SCALES - GENERAL: PAINLEVEL: NO PAIN (0)

## 2022-03-30 ASSESSMENT — ENCOUNTER SYMPTOMS
CHILLS: 0
FEVER: 0

## 2022-03-30 ASSESSMENT — ASTHMA QUESTIONNAIRES: ACT_TOTALSCORE: 24

## 2022-03-30 NOTE — NURSING NOTE
"Chief Complaint   Patient presents with     Procedure     nexplanon removal     Has been bleeding for 2 weeks and having cramps. Would like nexplanon removed and talk about a different birth control.    Initial /78   Pulse 62   Temp 97.5  F (36.4  C) (Tympanic)   Resp 16   Ht 1.651 m (5' 5\")   Wt 103 kg (227 lb)   LMP 03/16/2022   SpO2 97%   Breastfeeding No   BMI 37.77 kg/m   Estimated body mass index is 37.77 kg/m  as calculated from the following:    Height as of this encounter: 1.651 m (5' 5\").    Weight as of this encounter: 103 kg (227 lb).         Norma J. Gosselin, LPN   "

## 2022-03-30 NOTE — LETTER
My Asthma Action Plan    Name: Dina Barraza   YOB: 1975  Date: 3/30/2022   My doctor: Yue Tiwari, DO   My clinic: [facility]        My Rescue Medicine:   Albuterol inhaler (Proair/Ventolin/Proventil HFA)  2-4 puffs EVERY 4 HOURS as needed. Use a spacer if recommended by your provider.   My Asthma Severity:   Intermittent / Exercise Induced  Know your asthma triggers: allergies             GREEN ZONE   Good Control    I feel good    No cough or wheeze    Can work, sleep and play without asthma symptoms       Take your asthma control medicine every day.     1. If exercise triggers your asthma, take your rescue medication    15 minutes before exercise or sports, and    During exercise if you have asthma symptoms  2. Spacer to use with inhaler: If you have a spacer, make sure to use it with your inhaler             YELLOW ZONE Getting Worse  I have ANY of these:    I do not feel good    Cough or wheeze    Chest feels tight    Wake up at night   1. Keep taking your Green Zone medications  2. Start taking your rescue medicine:    every 20 minutes for up to 1 hour. Then every 4 hours for 24-48 hours.  3. If you stay in the Yellow Zone for more than 12-24 hours, contact your doctor.  4. If you do not return to the Green Zone in 12-24 hours or you get worse, start taking your oral steroid medicine if prescribed by your provider.           RED ZONE Medical Alert - Get Help  I have ANY of these:    I feel awful    Medicine is not helping    Breathing getting harder    Trouble walking or talking    Nose opens wide to breathe       1. Take your rescue medicine NOW  2. If your provider has prescribed an oral steroid medicine, start taking it NOW  3. Call your doctor NOW  4. If you are still in the Red Zone after 20 minutes and you have not reached your doctor:    Take your rescue medicine again and    Call 911 or go to the emergency room right away    See your regular doctor within  2 weeks of an Emergency Room or Urgent Care visit for follow-up treatment.          Annual Reminders:  Meet with Asthma Educator,  Flu Shot in the Fall, consider Pneumonia Vaccination for patients with asthma (aged 19 and older).    Pharmacy: [id number] ([facility] - [address]    Electronically signed by Yue Tiwari,    Date: 03/30/22                    Asthma Triggers  How To Control Things That Make Your Asthma Worse    Triggers are things that make your asthma worse.  Look at the list below to help you find your triggers and   what you can do about them. You can help prevent asthma flare-ups by staying away from your triggers.      Trigger                                                          What you can do   Cigarette Smoke  Tobacco smoke can make asthma worse. Do not allow smoking in your home, car or around you.  Be sure no one smokes at a child s day care or school.  If you smoke, ask your health care provider for ways to help you quit.  Ask family members to quit too.  Ask your health care provider for a referral to Quit Plan to help you quit smoking, or call 0-709-189-PLAN.     Colds, Flu, Bronchitis  These are common triggers of asthma. Wash your hands often.  Don t touch your eyes, nose or mouth.  Get a flu shot every year.     Dust Mites  These are tiny bugs that live in cloth or carpet. They are too small to see. Wash sheets and blankets in hot water every week.   Encase pillows and mattress in dust mite proof covers.  Avoid having carpet if you can. If you have carpet, vacuum weekly.   Use a dust mask and HEPA vacuum.   Pollen and Outdoor Mold  Some people are allergic to trees, grass, or weed pollen, or molds. Try to keep your windows closed.  Limit time out doors when pollen count is high.   Ask you health care provider about taking medicine during allergy season.     Animal Dander  Some people are allergic to skin flakes, urine or saliva from pets  with fur or feathers. Keep pets with fur or feathers out of your home.    If you can t keep the pet outdoors, then keep the pet out of your bedroom.  Keep the bedroom door closed.  Keep pets off cloth furniture and away from stuffed toys.     Mice, Rats, and Cockroaches  Some people are allergic to the waste from these pests.   Cover food and garbage.  Clean up spills and food crumbs.  Store grease in the refrigerator.   Keep food out of the bedroom.   Indoor Mold  This can be a trigger if your home has high moisture. Fix leaking faucets, pipes, or other sources of water.   Clean moldy surfaces.  Dehumidify basement if it is damp and smelly.   Smoke, Strong Odors, and Sprays  These can reduce air quality. Stay away from strong odors and sprays, such as perfume, powder, hair spray, paints, smoke incense, paint, cleaning products, candles and new carpet.   Exercise or Sports  Some people with asthma have this trigger. Be active!  Ask your doctor about taking medicine before sports or exercise to prevent symptoms.    Warm up for 5-10 minutes before and after sports or exercise.     Other Triggers of Asthma  Cold air:  Cover your nose and mouth with a scarf.  Sometimes laughing or crying can be a trigger.  Some medicines and food can trigger asthma.

## 2022-03-30 NOTE — PROGRESS NOTES
Assessment & Plan     Abnormal uterine bleeding  Encounter for Nexplanon removal  Symptoms persistent despite Nexplanon placement, and she would now like removal.  Gynecology records reviewed.  Informed written consent obtained.  Nexplanon removed as detailed in procedure note.  Short-course of Norethindrone provided to stop bleeding.  She will follow-up with gynecology.  - norethindrone (AYGESTIN) 5 MG tablet; Take 2 tablets (10 mg) by mouth daily    Essential hypertension  BP at goal today despite not taking her medication.  BP has been elevated beyond goal < 130/80 frequently in the past.  Recommend she monitor her blood pressure routinely and take her medication consistently.    Anxiety state  Tobacco abuse  She has tolerated Sertraline and Bupropion together in the past without complications.  The addition of Bupropion has helped her quit tobacco use in the past.  Continue Sertraline.  Start Bupropion.  Counseled on the potential for serotonin syndrome and Buspirone discontinued.  Follow-up in two months for reassessment.  - buPROPion (WELLBUTRIN XL) 150 MG 24 hr tablet; Take 1 tablet (150 mg) by mouth every morning    48 minutes spent on the date of the encounter doing chart review, history and exam, documentation and further activities per the note    Yue Tiwari, [facility]   Aleks is a 46 year old who presents for the following health issues     History of Present Illness       Mental Health Follow-up:                    Today's PHQ-9         PHQ-9 Total Score: 0  PHQ-9 Q9 Thoughts of better off dead/self-harm past 2 weeks :   Not at all    How difficult have these problems made it for you to do your work, take care of things at home, or get along with other people: Not difficult at all    Today's DARYN-7 Score: 8    Hypertension: She presents for follow up of hypertension.  She does check blood pressure  regularly outside of the clinic. Outside blood pressures have  "been over 140/90. She does not follow a low salt diet.     She eats 4 or more servings of fruits and vegetables daily.She consumes 2 sweetened beverage(s) daily.She exercises with enough effort to increase her heart rate 20 to 29 minutes per day.  She exercises with enough effort to increase her heart rate 3 or less days per week. She is missing 7 dose(s) of medications per week.  She takes her blood pressure medication irregularly.     She had a Nexplanon placed on 9/01 for irregular menstrual bleeding.  However, she reports that she has continued to have her menstrual cycle for two weeks every month which has been associated with abdominal cramping, irritability, and fatigue.    Anxiety, Tobacco abuse:  She is currently managed on Sertraline which she reports has controlled her anxiety well.  She would like to go back on Bupropion to aid with smoking cessation.  She states she has had success with this in the past.  She started smoking again about a year ago and is currently smoking 1 PPD.    Review of Systems   Constitutional: Negative for chills and fever.          Objective    /78   Pulse 62   Temp 97.5  F (36.4  C) (Tympanic)   Resp 16   Ht 1.651 m (5' 5\")   Wt 103 kg (227 lb)   LMP 03/16/2022   SpO2 97%   Breastfeeding No   BMI 37.77 kg/m    Body mass index is 37.77 kg/m .  Physical Exam  Constitutional:       General: She is not in acute distress.     Appearance: Normal appearance. She is not ill-appearing.   Cardiovascular:      Rate and Rhythm: Normal rate and regular rhythm.      Heart sounds: No murmur heard.    No friction rub. No gallop.   Pulmonary:      Effort: Pulmonary effort is normal.      Breath sounds: Normal breath sounds. No wheezing, rhonchi or rales.   Musculoskeletal:      Comments: Nexplanon palpable in left upper extremity.   Neurological:      Mental Status: She is alert.   Psychiatric:         Mood and Affect: Mood normal.       "

## 2022-03-30 NOTE — PROCEDURES
Left arm positioned and landmarks assessed.  Area cleansed with Chloraprep and anesthetized with 3 mL Lidocaine without epinephrine.  Incision made with #11 scalpel.  Nexplanon maneuvered to incision site and grasped with needle .  Gentle traction applied, and Nexplanon successfully removed.  Band-aid and compression dressing applied.  Minimal blood loss.  Patient tolerated the procedure well.    Yue Tiwari DO

## 2022-03-31 ASSESSMENT — ANXIETY QUESTIONNAIRES: GAD7 TOTAL SCORE: 8

## 2022-04-24 ENCOUNTER — OFFICE VISIT (OUTPATIENT)
Dept: FAMILY MEDICINE | Facility: OTHER | Age: 47
End: 2022-04-24
Attending: FAMILY MEDICINE
Payer: OTHER MISCELLANEOUS

## 2022-04-24 VITALS
RESPIRATION RATE: 16 BRPM | WEIGHT: 218 LBS | OXYGEN SATURATION: 98 % | HEART RATE: 83 BPM | SYSTOLIC BLOOD PRESSURE: 122 MMHG | TEMPERATURE: 98 F | HEIGHT: 65 IN | DIASTOLIC BLOOD PRESSURE: 70 MMHG | BODY MASS INDEX: 36.32 KG/M2

## 2022-04-24 DIAGNOSIS — S46.912A STRAIN OF LEFT SHOULDER, INITIAL ENCOUNTER: Primary | ICD-10-CM

## 2022-04-24 PROCEDURE — 99213 OFFICE O/P EST LOW 20 MIN: CPT | Performed by: NURSE PRACTITIONER

## 2022-04-24 RX ORDER — PREDNISONE 20 MG/1
20 TABLET ORAL DAILY
Qty: 5 TABLET | Refills: 0 | Status: SHIPPED | OUTPATIENT
Start: 2022-04-24 | End: 2022-04-29

## 2022-04-24 ASSESSMENT — PAIN SCALES - GENERAL: PAINLEVEL: SEVERE PAIN (7)

## 2022-04-24 NOTE — PATIENT INSTRUCTIONS
Recommend conservative measures, ibuprofen, tylenol, gentle massage, heat/ice.     Follow up with Dr. Velazquez for work comp related injury as scheduled.     Until follow up, recommend limited repetitious motion and avoid lifting >20 lbs until you follow up.     May return to work tomorrow 4/25/2022 with shoulder restrictions as outlined on your work ability form.     Prednisone sent x5 days.

## 2022-04-24 NOTE — NURSING NOTE
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    Initial /70 (BP Location: Right arm, Patient Position: Sitting, Cuff Size: Adult Large)   Pulse 83   Temp 98  F (36.7  C) (Tympanic)   Resp 16   Ht 1.651 m (5' 5\")   Wt 98.9 kg (218 lb)   LMP 03/30/2022   SpO2 98%   Breastfeeding No   BMI 36.28 kg/m   Estimated body mass index is 36.28 kg/m  as calculated from the following:    Height as of this encounter: 1.651 m (5' 5\").    Weight as of this encounter: 98.9 kg (218 lb).       FOOD SECURITY SCREENING QUESTIONS:    The next two questions are to help us understand your food security.  If you are feeling you need any assistance in this area, we have resources available to support you today.    Hunger Vital Signs:  Within the past 12 months we worried whether our food would run out before we got money to buy more. Never  Within the past 12 months the food we bought just didn't last and we didn't have money to get more. Never      Medication Reconciliation: Complete      Jil Ayers LPN  "

## 2022-04-24 NOTE — PROGRESS NOTES
ASSESSMENT/PLAN:    I have reviewed the nursing notes.  I have reviewed the findings, diagnosis, plan and need for follow up with the patient.    1. Strain of left shoulder, initial encounter  - predniSONE (DELTASONE) 20 MG tablet; Take 1 tablet (20 mg) by mouth daily for 5 days  Dispense: 5 tablet; Refill: 0  - May use over-the-counter Tylenol or ibuprofen PRN  Injured this shoulder a few months back at work; was not seen here but initial shoulder injury was documented with her employer. Different mechanism of injury with today's injury, however. Today's injury is consistent with a musculoskeletal shoulder strain. No concern for acute fracture or dislocation; did not feel xr was warranted based on normal physical examination findings (strength, range of motion). F/u scheduled with Dr. Velazquez for workmen's comp injury. Patient agreeable; placed on temporary lifting restriction due to significant pain with lifting and extension of the shoulder. Restrictions will be addressed/revise at upcoming appointment.   Recommend conservative treatment, prednisone has been helpful historically. Declines muscle relaxants. Ibuprofen/tylenol heat, rest, ice. Patient declined sling.     Follow up if symptoms persist or worsen or concerns    I explained my diagnostic considerations and recommendations to the patient, who voiced understanding and agreement with the treatment plan. All questions were answered. We discussed potential side effects of any prescribed or recommended therapies, as well as expectations for response to treatments.    Mable Almeida NP  4/24/2022  12:01 PM    HPI:  Dina Barraza is a 47 year old female who presents to [facility] today for concerns of left shoulder injury that occurred this morning while assisting a patient to scoot back in their wheelchair during flexing motion. She describes using a sena lift and the patient is large in size. She attempted to pull the sling backwards in a pulling  "motion and felt instant burning. Able to move the arm, no changes to strength. No numbness/tingling down the arm. She mentions that she is not interested in pain medications, dislike muscle relaxants, and prednisone has been helpful with similar injury in the past.     Hx of left shoulder injury where she fell at work a few months ago. She filed this work comp injury with her employer but was not seen at [facility]. She has been seeing chiropractor for that. Improving; until today's epsiode of reinjury.     Historically has used steroid to help with discomfort. Has had xrays through chiropractor.     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     Took some ibuprofen and tylenol after injury helping a little with the pain.   ROM is painful but full. Strength is full. Burning sensation located posterior under the shoudler blade as well as the trapezius muscles.     She does not like muscle relaxers. Did not tolerate flexeril well in the past.      Does not desire a sling.     Past medical history, past surgical history, current medications and allergies reviewed and accurate to the best of my knowledge.      ROS:  Refer to HPI    /70 (BP Location: Right arm, Patient Position: Sitting, Cuff Size: Adult Large)   Pulse 83   Temp 98  F (36.7  C) (Tympanic)   Resp 16   Ht 1.651 m (5' 5\")   Wt 98.9 kg (218 lb)   LMP 03/30/2022   SpO2 98%   Breastfeeding No   BMI 36.28 kg/m      EXAM:  General Appearance: Well appearing 47 year old female, appropriate appearance for age. No acute distress   Respiratory: normal chest wall and respirations.  Normal effort.  Clear to auscultation bilaterally, no wheezing, crackles or rhonchi.  No increased work of breathing.  No cough appreciated.  Musculoskeletal:  LEFT SHOULDER PHYSICAL EXAMINATION:  Gross Examination: shoulders appear symmetrical in appearance, no signs of bony deformity over the AC, clavicle or glenohumeral joints. No signs of previous or current trauma. No signs of " ecchymosis. Skin is intact.     Palpation: Tenderness to palpation: upper trapezius region    ROM: flexion full, extension full, but painful with extension, abduction full, adduction full, IR full, ER full    Special Testing:   Shoulder Strength:    5/5, no changes to strength      Impingement:    Empty Can (supraspinatus): negative    Neurovascular status: distal pulses are intact and are 2+.      Psychological: normal affect, alert, oriented, and pleasant.

## 2022-04-28 ENCOUNTER — OFFICE VISIT (OUTPATIENT)
Dept: FAMILY MEDICINE | Facility: OTHER | Age: 47
End: 2022-04-28
Attending: CHIROPRACTOR
Payer: OTHER MISCELLANEOUS

## 2022-04-28 VITALS
BODY MASS INDEX: 36.78 KG/M2 | OXYGEN SATURATION: 98 % | WEIGHT: 221 LBS | DIASTOLIC BLOOD PRESSURE: 80 MMHG | TEMPERATURE: 98.8 F | HEART RATE: 84 BPM | RESPIRATION RATE: 20 BRPM | SYSTOLIC BLOOD PRESSURE: 132 MMHG

## 2022-04-28 DIAGNOSIS — Z53.9 ERRONEOUS ENCOUNTER--DISREGARD: Primary | ICD-10-CM

## 2022-04-28 ASSESSMENT — PAIN SCALES - GENERAL: PAINLEVEL: NO PAIN (1)

## 2022-04-28 NOTE — PROGRESS NOTES
This encounter was opened in error. Please disregard.  Patient wants to proceed with care with Dr. Pettit who is currently managing a previous work condition, and of which this injury is related.

## 2022-04-28 NOTE — NURSING NOTE
"Patient presents to the clinic for work comp follow up.    FOOD SECURITY SCREENING QUESTIONS:    The next two questions are to help us understand your food security.  If you are feeling you need any assistance in this area, we have resources available to support you today.    Hunger Vital Signs:  Within the past 12 months we worried whether our food would run out before we got money to buy more. Never  Within the past 12 months the food we bought just didn't last and we didn't have money to get more. Never    Advance Care Directive on file? no  Advance Care Directive provided to patient? Declined.  Chief Complaint   Patient presents with     Work Comp       Initial /80 (BP Location: Right arm, Patient Position: Sitting, Cuff Size: Adult Regular)   Pulse 84   Temp 98.8  F (37.1  C) (Tympanic)   Resp 20   Wt 100.2 kg (221 lb)   LMP 03/30/2022   SpO2 98%   BMI 36.78 kg/m   Estimated body mass index is 36.78 kg/m  as calculated from the following:    Height as of 4/24/22: 1.651 m (5' 5\").    Weight as of this encounter: 100.2 kg (221 lb).  Medication Reconciliation: complete        Mirian Garcia LPN       "

## 2022-05-14 DIAGNOSIS — F41.0 PANIC ATTACK: ICD-10-CM

## 2022-05-17 NOTE — TELEPHONE ENCOUNTER
Disp Refills Start End [facility]   LORazepam (ATIVAN) 0.5 MG tablet 120 tablet 3 1/24/2022  No   Sig: TAKE 1-2 TABLETS BY MOUTH DAILY AND 2 TABLETS NIGHTLYAS NEEDED FOR ANXIETY/SLEEP       LOV: 3/30/2022  Future Office visit: No future appointment scheduled at this time.      Routing refill request to provider for review/approval.  Per Quality report patient is due for: Preventive care visit, mammo screening, colorectal cancer screening.    Requested Prescriptions   Pending Prescriptions Disp Refills     LORazepam (ATIVAN) 0.5 MG tablet [Pharmacy Med Name: LORAZEPAM 0.5MG TABLET] 120 tablet 3     Sig: TAKE 1-2 TABLETS BY MOUTH DAILY AND 2 TABLETS NIGHTLY AS NEEDED FOR ANXIETY/SLEEP       There is no refill protocol information for this order        Routed to provider for review and consideration. Jovana Powers RN  ....................  5/17/2022   3:30 PM

## 2022-05-18 RX ORDER — LORAZEPAM 0.5 MG/1
TABLET ORAL
Qty: 120 TABLET | Refills: 3 | Status: SHIPPED | OUTPATIENT
Start: 2022-05-18 | End: 2022-09-01

## 2022-05-21 ENCOUNTER — HEALTH MAINTENANCE LETTER (OUTPATIENT)
Age: 47
End: 2022-05-21

## 2022-06-20 ENCOUNTER — OFFICE VISIT (OUTPATIENT)
Dept: FAMILY MEDICINE | Facility: OTHER | Age: 47
End: 2022-06-20
Attending: FAMILY MEDICINE
Payer: COMMERCIAL

## 2022-06-20 VITALS
HEART RATE: 90 BPM | TEMPERATURE: 99 F | DIASTOLIC BLOOD PRESSURE: 110 MMHG | BODY MASS INDEX: 37.36 KG/M2 | HEIGHT: 65 IN | SYSTOLIC BLOOD PRESSURE: 150 MMHG | OXYGEN SATURATION: 97 % | WEIGHT: 224.2 LBS | RESPIRATION RATE: 16 BRPM

## 2022-06-20 DIAGNOSIS — Z00.00 ROUTINE HISTORY AND PHYSICAL EXAMINATION OF ADULT: Primary | ICD-10-CM

## 2022-06-20 DIAGNOSIS — I10 ESSENTIAL HYPERTENSION: ICD-10-CM

## 2022-06-20 DIAGNOSIS — J45.20 MILD INTERMITTENT ASTHMA WITHOUT COMPLICATION: ICD-10-CM

## 2022-06-20 DIAGNOSIS — F41.1 ANXIETY STATE: ICD-10-CM

## 2022-06-20 DIAGNOSIS — Z13.220 SCREENING CHOLESTEROL LEVEL: ICD-10-CM

## 2022-06-20 PROCEDURE — 99396 PREV VISIT EST AGE 40-64: CPT | Performed by: FAMILY MEDICINE

## 2022-06-20 PROCEDURE — G0463 HOSPITAL OUTPT CLINIC VISIT: HCPCS

## 2022-06-20 RX ORDER — ESCITALOPRAM OXALATE 10 MG/1
10 TABLET ORAL DAILY
Qty: 30 TABLET | Refills: 1 | Status: SHIPPED | OUTPATIENT
Start: 2022-06-20 | End: 2022-08-29

## 2022-06-20 RX ORDER — BECLOMETHASONE DIPROPIONATE HFA 40 UG/1
1 AEROSOL, METERED RESPIRATORY (INHALATION) 2 TIMES DAILY
Qty: 10.6 G | Refills: 11 | Status: ON HOLD | OUTPATIENT
Start: 2022-06-20 | End: 2023-04-06

## 2022-06-20 RX ORDER — ALBUTEROL SULFATE 90 UG/1
1-2 AEROSOL, METERED RESPIRATORY (INHALATION) EVERY 6 HOURS PRN
Qty: 18 G | Refills: 11 | Status: SHIPPED | OUTPATIENT
Start: 2022-06-20

## 2022-06-20 ASSESSMENT — ENCOUNTER SYMPTOMS
DIARRHEA: 0
NERVOUS/ANXIOUS: 0
CHILLS: 0
ARTHRALGIAS: 0
PALPITATIONS: 0
DIZZINESS: 0
BREAST MASS: 0
EYE PAIN: 0
ABDOMINAL PAIN: 0
SHORTNESS OF BREATH: 0
HEARTBURN: 0
CONSTIPATION: 0
MYALGIAS: 0
FEVER: 0
JOINT SWELLING: 0
HEADACHES: 0
PARESTHESIAS: 0
HEMATURIA: 0
SORE THROAT: 0
HEMATOCHEZIA: 0
WEAKNESS: 0
DYSURIA: 0
FREQUENCY: 0
COUGH: 0
NAUSEA: 0

## 2022-06-20 ASSESSMENT — PAIN SCALES - GENERAL: PAINLEVEL: NO PAIN (0)

## 2022-06-20 NOTE — PROGRESS NOTES
SUBJECTIVE:   CC: Dina Barraza is an 47 year old woman who presents for preventive health visit.       Patient has been advised of split billing requirements and indicates understanding: Yes  Healthy Habits:     Getting at least 3 servings of Calcium per day:  Yes    Bi-annual eye exam:  NO    Dental care twice a year:  Yes    Sleep apnea or symptoms of sleep apnea:  None    Diet:  Carbohydrate counting    Frequency of exercise:  2-3 days/week    Duration of exercise:  15-30 minutes    Taking medications regularly:  Yes    Medication side effects:  None    PHQ-2 Total Score: 2    Additional concerns today:  No    She has a history of anxiety in the past she has tried numerous medications, most recently sertraline and Wellbutrin.  This was not helpful and in fact she felt like it was making her symptoms worsen.  She has since stopped both those medications.    He also has a history of hypertension but ran out of her medications quite a while ago.  She has been checking her blood pressure at work and her blood pressures generally are in the 120s systolically.    She had her Nexplanon removed several months ago and her menstrual cycles have returned to normal.  If or if she has a history of asthma and continues on Qvar fairly regularly and albuterol as needed, tends to be worse in the spring with allergies.          Today's PHQ-2 Score:   PHQ-2 ( 1999 Pf[employer]) 6/20/2022   Q1: Little interest or pleasure in doing things 1   Q2: Feeling down, depressed or hopeless 1   PHQ-2 Score 2   PHQ-2 Total Score (12-17 Years)- Positive if 3 or more points; Administer PHQ-A if positive -   Q1: Little interest or pleasure in doing things Several days   Q2: Feeling down, depressed or hopeless Several days   PHQ-2 Score 2       Abuse: Current or Past (Physical, Sexual or Emotional) - No  Do you feel safe in your environment? Yes    Have you ever done Advance Care Planning? (For example, a Health Directive, POLST, or a  "discussion with a medical provider or your loved ones about your wishes): No, advance care planning information given to patient to review.  Advanced care planning was discussed at today's visit.    Social History     Tobacco Use     Smoking status: Current Every Day Smoker     Packs/day: 1.00     Years: 15.00     Pack years: 15.00     Types: Cigarettes     Last attempt to quit: 2018     Years since quittin.4     Smokeless tobacco: Never Used   Substance Use Topics     Alcohol use: Not Currently     Comment: rare     If you drink alcohol do you typically have >3 drinks per day or >7 drinks per week? No    Alcohol Use 2022   Prescreen: >3 drinks/day or >7 drinks/week? No   No flowsheet data found.    Reviewed orders with patient.  Reviewed health maintenance and updated orders accordingly - Yes    Breast Cancer Screening:  Any new diagnosis of family breast, ovarian, or bowel cancer? No    FHS-7: No flowsheet data found.  click delete button to remove this line now      History of abnormal Pap smear:  PAP / HPV Latest Ref Rng & Units 2021   PAP   Negative for Intraepithelial Lesion or Malignancy (NILM)   HPV16 Negative Negative   HPV18 Negative Negative   HRHPV Negative Positive(A)     Reviewed and updated as needed this visit by clinical staff   Tobacco  Allergies  Meds                Reviewed and updated as needed this visit by Provider                       Review of Systems       OBJECTIVE:   Pulse 90   Temp 99  F (37.2  C) (Tympanic)   Resp 16   Ht 1.651 m (5' 5\")   Wt 101.7 kg (224 lb 3.2 oz)   LMP 2022 (Approximate)   SpO2 97%   Breastfeeding No   BMI 37.31 kg/m    Physical Exam  GENERAL: healthy, alert and no distress  EYES: Eyes grossly normal to inspection, PERRL and conjunctivae and sclerae normal  HENT: ear canals and TM's normal, nose and mouth without ulcers or lesions  NECK: no adenopathy, no asymmetry, masses, or scars and thyroid normal to palpation  RESP: lungs " "clear to auscultation - no rales, rhonchi or wheezes  CV: regular rate and rhythm, normal S1 S2, no S3 or S4, no murmur, click or rub, no peripheral edema and peripheral pulses strong  MS: no gross musculoskeletal defects noted, no edema  SKIN: no suspicious lesions or rashes  NEURO: Normal strength and tone, mentation intact and speech normal  PSYCH: mentation appears normal, affect normal/bright    Diagnostic Test Results:  none     ASSESSMENT/PLAN:       ICD-10-CM    1. Routine history and physical examination of adult  Z00.00    2. Essential hypertension  I10    3. Anxiety state  F41.1 escitalopram (LEXAPRO) 10 MG tablet   4. Mild intermittent asthma without complication  J45.20 beclomethasone HFA (QVAR REDIHALER) 40 MCG/ACT inhaler     albuterol (PROAIR HFA/PROVENTIL HFA/VENTOLIN HFA) 108 (90 Base) MCG/ACT inhaler   5. Screening cholesterol level  Z13.220 Lipid Panel     Comprehensive Metabolic Panel     She will return for fasting labs.    She will consider colon cancer screening options which we discussed today include colonoscopy and Cologuard.    For her anxiety she has already stopped bupropion and sertraline.  Discussed options and will trial Lexapro 10 mg daily.  Potential side effects were discussed patient contact me 1 month to let me know how she is doing.    Tetanus in 2023.    Discussed mammogram options, she will consider and contact me if she wants to proceed.    Patient has been advised of split billing requirements and indicates understanding: Yes    COUNSELING:  Reviewed preventive health counseling, as reflected in patient instructions       Regular exercise       Healthy diet/nutrition       Vision screening       Colorectal Cancer Screening    Estimated body mass index is 37.31 kg/m  as calculated from the following:    Height as of this encounter: 1.651 m (5' 5\").    Weight as of this encounter: 101.7 kg (224 lb 3.2 oz).    Weight management plan: Discussed healthy diet and exercise " guidelines    She reports that she has been smoking cigarettes. She has a 15.00 pack-year smoking history. She has never used smokeless tobacco.  Tobacco Cessation Action Plan:   Information offered: Patient not interested at this time      Counseling Resources:  ATP IV Guidelines  Pooled Cohorts Equation Calculator  Breast Cancer Risk Calculator  BRCA-Related Cancer Risk Assessment: FHS-7 Tool  FRAX Risk Assessment  ICSI Preventive Guidelines  Dietary Guidelines for Americans, 2010  USDA's MyPlate  ASA Prophylaxis  Lung CA Screening    Lanre Shah  [facility] AND [facility]

## 2022-06-20 NOTE — NURSING NOTE
"No chief complaint on file.      Medication reconciliation completed.    FOOD SECURITY SCREENING QUESTIONS:    The next two questions are to help us understand your food security.  If you are feeling you need any assistance in this area, we have resources available to support you today.    Hunger Vital Signs:  Within the past 12 months we worried whether our food would run out before we got money to buy more. Never  Within the past 12 months the food we bought just didn't last and we didn't have money to get more. Never    Initial Pulse 90   Temp 99  F (37.2  C) (Tympanic)   Resp 16   Ht 1.651 m (5' 5\")   Wt 101.7 kg (224 lb 3.2 oz)   LMP 06/01/2022 (Approximate)   SpO2 97%   Breastfeeding No   BMI 37.31 kg/m   Estimated body mass index is 37.31 kg/m  as calculated from the following:    Height as of this encounter: 1.651 m (5' 5\").    Weight as of this encounter: 101.7 kg (224 lb 3.2 oz).       Bridget Chávez LPN .......  6/20/2022  3:10 PM    "

## 2022-08-24 ENCOUNTER — OFFICE VISIT (OUTPATIENT)
Dept: FAMILY MEDICINE | Facility: OTHER | Age: 47
End: 2022-08-24
Attending: PHYSICIAN ASSISTANT
Payer: COMMERCIAL

## 2022-08-24 ENCOUNTER — HOSPITAL ENCOUNTER (OUTPATIENT)
Dept: MRI IMAGING | Facility: OTHER | Age: 47
Discharge: HOME | End: 2022-08-24
Attending: CHIROPRACTOR | Admitting: CHIROPRACTOR
Payer: OTHER MISCELLANEOUS

## 2022-08-24 VITALS
RESPIRATION RATE: 15 BRPM | OXYGEN SATURATION: 99 % | TEMPERATURE: 97.4 F | BODY MASS INDEX: 36.31 KG/M2 | DIASTOLIC BLOOD PRESSURE: 89 MMHG | HEART RATE: 74 BPM | WEIGHT: 218.2 LBS | SYSTOLIC BLOOD PRESSURE: 118 MMHG

## 2022-08-24 DIAGNOSIS — Z11.3 SCREEN FOR STD (SEXUALLY TRANSMITTED DISEASE): Primary | ICD-10-CM

## 2022-08-24 DIAGNOSIS — M89.8X1 SHOULDER BLADE PAIN: ICD-10-CM

## 2022-08-24 DIAGNOSIS — M54.2 NECK PAIN ON LEFT SIDE: ICD-10-CM

## 2022-08-24 LAB
C TRACH DNA SPEC QL PROBE+SIG AMP: NEGATIVE
N GONORRHOEA DNA SPEC QL NAA+PROBE: NEGATIVE

## 2022-08-24 PROCEDURE — 72141 MRI NECK SPINE W/O DYE: CPT

## 2022-08-24 PROCEDURE — 87491 CHLMYD TRACH DNA AMP PROBE: CPT | Mod: ZL | Performed by: PHYSICIAN ASSISTANT

## 2022-08-24 PROCEDURE — 99213 OFFICE O/P EST LOW 20 MIN: CPT | Performed by: PHYSICIAN ASSISTANT

## 2022-08-24 PROCEDURE — G0463 HOSPITAL OUTPT CLINIC VISIT: HCPCS

## 2022-08-24 ASSESSMENT — ANXIETY QUESTIONNAIRES
GAD7 TOTAL SCORE: 6
7. FEELING AFRAID AS IF SOMETHING AWFUL MIGHT HAPPEN: NOT AT ALL
2. NOT BEING ABLE TO STOP OR CONTROL WORRYING: SEVERAL DAYS
5. BEING SO RESTLESS THAT IT IS HARD TO SIT STILL: SEVERAL DAYS
1. FEELING NERVOUS, ANXIOUS, OR ON EDGE: SEVERAL DAYS
3. WORRYING TOO MUCH ABOUT DIFFERENT THINGS: SEVERAL DAYS
6. BECOMING EASILY ANNOYED OR IRRITABLE: SEVERAL DAYS
GAD7 TOTAL SCORE: 6
IF YOU CHECKED OFF ANY PROBLEMS ON THIS QUESTIONNAIRE, HOW DIFFICULT HAVE THESE PROBLEMS MADE IT FOR YOU TO DO YOUR WORK, TAKE CARE OF THINGS AT HOME, OR GET ALONG WITH OTHER PEOPLE: SOMEWHAT DIFFICULT

## 2022-08-24 ASSESSMENT — PAIN SCALES - GENERAL: PAINLEVEL: NO PAIN (0)

## 2022-08-24 ASSESSMENT — PATIENT HEALTH QUESTIONNAIRE - PHQ9: 5. POOR APPETITE OR OVEREATING: SEVERAL DAYS

## 2022-08-24 NOTE — PROGRESS NOTES
ASSESSMENT/PLAN:    I have reviewed the nursing notes.  I have reviewed the findings, diagnosis, plan and need for follow up with the patient.    1. Screen for STD (sexually transmitted disease)  - GC/Chlamydia by PCR  - Patient encounter for STD testing. Gonorrhea and Chlamydia testing were negative today. She kindly declined additional testing. Safe sex practices were reviewed during visit today.  If patient has any changing or worsening symptoms such as abdominal pain, genitalia concerns/discharge or other concerning symptoms they are agreeable to return for reevaluation. Patient is in agreement and understanding of the above treatment plan. All questions and concerns were addressed and answered to patient's satisfaction. AVS reviewed with patient.     Discussed warning signs/symptoms indicative of need to f/u    Follow up if symptoms persist or worsen or concerns    I explained my diagnostic considerations and recommendations to the patient, who voiced understanding and agreement with the treatment plan. All questions were answered. We discussed potential side effects of any prescribed or recommended therapies, as well as expectations for response to treatments.    Jasmine Tillman PA-C  8/24/2022  11:16 AM    HPI:    Dina Barraza is a 47 year old female  who presents to [facility] today for concerns of possible STD. She is asymptomatic (no urinary concerns or vaginal discharge) but partner has burning with urination and has concerns of possible STD due to unfaithful partner in past. No genital lesions for herself or partner. She has no history of HPV, Hep B, Hep C, Syphilis, GC or HIV. No current condom use. Unsure of exposure and type of exposure to STD, but kindly requesting GC testing today. No IV drug use. Social alcohol use. + daily smoker/tobacco use.     Past Medical History:   Diagnosis Date     Major depressive disorder, single episode     During an unhealthy past marriage     Mild intermittent  asthma, uncomplicated     Mild intermittent asthma     Past Surgical History:   Procedure Laterality Date     LAPAROSCOPIC CHOLECYSTECTOMY      02     LAPAROSCOPIC TUBAL LIGATION           Social History     Tobacco Use     Smoking status: Current Every Day Smoker     Packs/day: 1.00     Years: 15.00     Pack years: 15.00     Types: Cigarettes     Last attempt to quit: 2018     Years since quittin.6     Smokeless tobacco: Never Used   Substance Use Topics     Alcohol use: Not Currently     Comment: rare     Current Outpatient Medications   Medication Sig Dispense Refill     albuterol (PROAIR HFA/PROVENTIL HFA/VENTOLIN HFA) 108 (90 Base) MCG/ACT inhaler Inhale 1-2 puffs into the lungs every 6 hours as needed for shortness of breath / dyspnea or wheezing 18 g 11     beclomethasone HFA (QVAR REDIHALER) 40 MCG/ACT inhaler Inhale 1 puff into the lungs 2 times daily 10.6 g 11     escitalopram (LEXAPRO) 10 MG tablet Take 1 tablet (10 mg) by mouth daily 30 tablet 1     LORazepam (ATIVAN) 0.5 MG tablet TAKE 1-2 TABLETS BY MOUTH DAILY AND 2 TABLETS NIGHTLY AS NEEDED FOR ANXIETY/SLEEP 120 tablet 3     norethindrone (AYGESTIN) 5 MG tablet Take 2 tablets (10 mg) by mouth daily 10 tablet 0     Allergies   Allergen Reactions     Ranitidine Hives     Oxycodone Rash     Sulfa Drugs Rash     Sulfamethoxazole-Trimethoprim Rash     Past medical history, past surgical history, current medications and allergies reviewed and accurate to the best of my knowledge.      ROS:  Refer to HPI    /89 (BP Location: Left arm, Patient Position: Sitting)   Pulse 74   Temp 97.4  F (36.3  C) (Tympanic)   Resp 15   Wt 99 kg (218 lb 3.2 oz)   SpO2 99%   BMI 36.31 kg/m      EXAM:  General Appearance: Well appearing 47 year old female, appropriate appearance for age. No acute distress   Respiratory: normal chest wall and respirations.  Normal effort.  Clear to auscultation bilaterally, no wheezing, crackles or rhonchi.  No  increased work of breathing.  No cough appreciated.  Cardiac: RRR with no murmurs  Abdomen: soft, nontender, no rigidity, no rebound tenderness or guarding, normal bowel sounds present  :  No suprapubic tenderness to palpation.  Absent CVA tenderness to palpation.    Dermatological: no rashes noted of exposed skin  Psychological: normal affect, alert, oriented, and pleasant.     Labs:  Negative GC    Xray:  None

## 2022-08-24 NOTE — NURSING NOTE
"Chief Complaint   Patient presents with     STD     Patient is not having symptoms but looking to get tested for STI/STD. Reported previous boyfriend had cheated on her prior to her leaving the relationship and never got tested. Now current boyfriend is having pain with urination so looking to rule out STI/STD prior to him coming in to be evaluated as he is currently working.    Initial /89 (BP Location: Left arm, Patient Position: Sitting)   Pulse 74   Temp 97.4  F (36.3  C) (Tympanic)   Resp 15   Wt 99 kg (218 lb 3.2 oz)   SpO2 99%   BMI 36.31 kg/m   Estimated body mass index is 36.31 kg/m  as calculated from the following:    Height as of 6/20/22: 1.651 m (5' 5\").    Weight as of this encounter: 99 kg (218 lb 3.2 oz).  Medication Reconciliation: complete  Jovana Powers RN  "

## 2022-08-25 NOTE — PATIENT INSTRUCTIONS
Testing negative/normal today    If new symptoms (urinary, vaginal discharge/pain, etc.), recommend follow up

## 2022-08-29 ENCOUNTER — MYC MEDICAL ADVICE (OUTPATIENT)
Dept: FAMILY MEDICINE | Facility: OTHER | Age: 47
End: 2022-08-29

## 2022-08-29 DIAGNOSIS — F41.1 ANXIETY STATE: ICD-10-CM

## 2022-08-29 RX ORDER — ESCITALOPRAM OXALATE 10 MG/1
20 TABLET ORAL DAILY
Qty: 30 TABLET | Refills: 1 | Status: SHIPPED | OUTPATIENT
Start: 2022-08-29 | End: 2022-10-31

## 2022-08-31 DIAGNOSIS — F41.0 PANIC ATTACK: ICD-10-CM

## 2022-09-01 RX ORDER — LORAZEPAM 0.5 MG/1
TABLET ORAL
Qty: 120 TABLET | Refills: 3 | Status: SHIPPED | OUTPATIENT
Start: 2022-09-01 | End: 2023-01-20

## 2022-09-17 ENCOUNTER — HEALTH MAINTENANCE LETTER (OUTPATIENT)
Age: 47
End: 2022-09-17

## 2022-10-01 ENCOUNTER — MYC MEDICAL ADVICE (OUTPATIENT)
Dept: FAMILY MEDICINE | Facility: OTHER | Age: 47
End: 2022-10-01

## 2022-10-03 ENCOUNTER — NURSE TRIAGE (OUTPATIENT)
Dept: FAMILY MEDICINE | Facility: OTHER | Age: 47
End: 2022-10-03
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
## 2022-10-03 RX ORDER — DOXYCYCLINE 100 MG/1
CAPSULE ORAL
Qty: 2 CAPSULE | Refills: 0 | Status: SHIPPED | OUTPATIENT
Start: 2022-10-03 | End: 2022-10-04

## 2022-10-03 NOTE — TELEPHONE ENCOUNTER
"Patient requesting prophylaxis for a deer tick bite over the weekend. [employer] message 10/1/22 includes images of the ankle and the tick. Please review in PCP absence.     Presley Doran RN on 10/3/2022 at 9:53 AM    Reason for Disposition    Tick bite with no complications    Additional Information    Negative: Not a tick bite    Negative: Patient sounds very sick or weak to the triager    Negative: Fever or severe headache occurs, 2 to 14 days following the bite    Negative: Widespread rash occurs, 2 to 14 days following the bite    Negative: Can't remove live tick (after using Care Advice)    Negative: Fever and spreading red area or streak    Negative: Fever and area is very tender to touch    Negative: Red streak or red line and length > 2 inches (5 cm)    Negative: Red or very tender (to touch) area and started over 24 hours after the bite    Negative: Red ring or bull's-eye rash occurs around a deer tick bite    Negative: Probable deer tick that was attached > 36 hours (or tick appears swollen, not flat)    Negative: Patient wants to be seen    Answer Assessment - Initial Assessment Questions  1. TYPE of TICK: \"Is it a wood tick or a deer tick?\" (e.g., deer tick, wood tick; unsure)      Deer tick  2. SIZE of TICK: \"How big is the tick?\" (e.g., size of poppy seed, apple seed, watermelon seed; unsure) Note: Deer ticks can be the size of a poppy seed (nymph) or an apple seed (adult).        Positive that it was a deer tick  3. ENGORGED: \"Did the tick look flat or engorged (full, swollen)?\" (e.g., flat, engorged; unsure)      possibly  4. LOCATION: \"Where is the tick bite located?\"       ankle  5. ONSET: \"How long do you think the tick was attached before you removed it?\" (e.g., 5 hours, 2 days)       unsure  6. APPEARANCE of BITE or RASH: \"What does the site look like?\"      Dime-sized area of redness at site of attachment  7. PREGNANCY: \"Is there any chance you are pregnant?\" \"When was " "your last menstrual period?\"      no    Protocols used: TICK BITE-A-OH    "

## 2022-10-03 NOTE — TELEPHONE ENCOUNTER
Patient would like to speak to the nurse about a tick bite.    Amaya Huizar on 10/3/2022 at 9:11 AM

## 2023-01-17 DIAGNOSIS — F41.0 PANIC ATTACK: ICD-10-CM

## 2023-01-20 DIAGNOSIS — F41.0 PANIC ATTACK: ICD-10-CM

## 2023-01-20 RX ORDER — LORAZEPAM 0.5 MG/1
TABLET ORAL
Qty: 120 TABLET | Refills: 3 | OUTPATIENT
Start: 2023-01-20

## 2023-01-20 RX ORDER — LORAZEPAM 0.5 MG/1
TABLET ORAL
Qty: 120 TABLET | Refills: 0 | OUTPATIENT
Start: 2023-01-20

## 2023-01-20 NOTE — TELEPHONE ENCOUNTER
Requested Prescriptions   Pending Prescriptions Disp Refills     LORazepam (ATIVAN) 0.5 MG tablet 120 tablet 3     Sig: TAKE 1-2 TABLETS BY MOUTH DAILY AND 2 TABLETS NIGHTLYAS NEEDED FOR ANXIETY/SLEEP   Last Prescription Date:   9/1/22  Last Fill Qty/Refills:         120, R-3    Last Office Visit:              6/20/22   Future Office visit:           None    Shannan Cortez RN .............. 1/20/2023  10:31 AM

## 2023-01-20 NOTE — TELEPHONE ENCOUNTER
Shanice [facility] called regarding status of refill, as original request was faxed 1/17. She states Pt is out of medication and waiting in town. Shanice informed of need for appointment due to being controlled substance. She is requesting this be routed back to Dr. Shah for consideration of limited supply, until Pt can make appointment. Shannan Cortez RN .............. 1/20/2023  2:36 PM

## 2023-01-20 NOTE — TELEPHONE ENCOUNTER
In clinical absence of patient's primary, Lanre Shah, patient is requesting that this message be sent to the covering provider for consideration please.    Shannan Cortez RN .............. 1/20/2023  1:22 PM

## 2023-01-20 NOTE — TELEPHONE ENCOUNTER
Reason for call: Medication or medication refill    Name of medication requested: lorazepam    Are you out of the medication? yes    What pharmacy do you use? [facility] in [place]     Preferred method for responding to this message: Telephone Call    Phone number patient can be reached at: Cell number on file:    Telephone Information:   Mobile [phone]       If we cannot reach you directly, may we leave a detailed response at the number you provided? Yes     Patients primary has been out of clinic. Patient sent note up 1/17/23 for refill. Can another provider inunit 1 refill today please?    Amaya Huizar on 1/20/2023 at 10:28 AM

## 2023-01-23 RX ORDER — LORAZEPAM 0.5 MG/1
TABLET ORAL
Qty: 120 TABLET | Refills: 0 | Status: SHIPPED | OUTPATIENT
Start: 2023-01-23 | End: 2023-02-27

## 2023-02-20 ENCOUNTER — MYC MEDICAL ADVICE (OUTPATIENT)
Dept: FAMILY MEDICINE | Facility: OTHER | Age: 48
End: 2023-02-20
Payer: COMMERCIAL

## 2023-02-22 ENCOUNTER — LAB (OUTPATIENT)
Dept: LAB | Facility: OTHER | Age: 48
End: 2023-02-22
Attending: FAMILY MEDICINE
Payer: COMMERCIAL

## 2023-02-22 DIAGNOSIS — E04.1 THYROID NODULE: ICD-10-CM

## 2023-02-22 DIAGNOSIS — Z13.220 SCREENING CHOLESTEROL LEVEL: ICD-10-CM

## 2023-02-22 LAB
ALBUMIN SERPL BCG-MCNC: 4.3 G/DL (ref 3.5–5.2)
ALP SERPL-CCNC: 88 U/L (ref 35–104)
ALT SERPL W P-5'-P-CCNC: 15 U/L (ref 10–35)
ANION GAP SERPL CALCULATED.3IONS-SCNC: 7 MMOL/L (ref 7–15)
AST SERPL W P-5'-P-CCNC: 15 U/L (ref 10–35)
BILIRUB SERPL-MCNC: 0.5 MG/DL
BUN SERPL-MCNC: 16.3 MG/DL (ref 6–20)
CALCIUM SERPL-MCNC: 9.1 MG/DL (ref 8.6–10)
CHLORIDE SERPL-SCNC: 103 MMOL/L (ref 98–107)
CHOLEST SERPL-MCNC: 204 MG/DL
CREAT SERPL-MCNC: 0.61 MG/DL (ref 0.51–0.95)
DEPRECATED HCO3 PLAS-SCNC: 28 MMOL/L (ref 22–29)
GFR SERPL CREATININE-BSD FRML MDRD: >90 ML/MIN/1.73M2
GLUCOSE SERPL-MCNC: 104 MG/DL (ref 70–99)
HDLC SERPL-MCNC: 51 MG/DL
HOLD SPECIMEN: NORMAL
LDLC SERPL CALC-MCNC: 137 MG/DL
NONHDLC SERPL-MCNC: 153 MG/DL
POTASSIUM SERPL-SCNC: 4.5 MMOL/L (ref 3.4–5.3)
PROT SERPL-MCNC: 7.1 G/DL (ref 6.4–8.3)
SODIUM SERPL-SCNC: 138 MMOL/L (ref 136–145)
TRIGL SERPL-MCNC: 80 MG/DL

## 2023-02-22 PROCEDURE — 80053 COMPREHEN METABOLIC PANEL: CPT | Mod: ZL

## 2023-02-22 PROCEDURE — 84443 ASSAY THYROID STIM HORMONE: CPT | Mod: ZL

## 2023-02-22 PROCEDURE — 36415 COLL VENOUS BLD VENIPUNCTURE: CPT | Mod: ZL

## 2023-02-22 PROCEDURE — 80061 LIPID PANEL: CPT | Mod: ZL

## 2023-02-27 ENCOUNTER — OFFICE VISIT (OUTPATIENT)
Dept: FAMILY MEDICINE | Facility: OTHER | Age: 48
End: 2023-02-27
Attending: FAMILY MEDICINE
Payer: COMMERCIAL

## 2023-02-27 VITALS
SYSTOLIC BLOOD PRESSURE: 134 MMHG | HEART RATE: 80 BPM | RESPIRATION RATE: 18 BRPM | WEIGHT: 226.4 LBS | BODY MASS INDEX: 37.67 KG/M2 | OXYGEN SATURATION: 99 % | DIASTOLIC BLOOD PRESSURE: 88 MMHG | TEMPERATURE: 98 F

## 2023-02-27 DIAGNOSIS — F41.0 PANIC ATTACK: ICD-10-CM

## 2023-02-27 DIAGNOSIS — F41.1 ANXIETY STATE: ICD-10-CM

## 2023-02-27 DIAGNOSIS — E04.1 THYROID NODULE: Primary | ICD-10-CM

## 2023-02-27 DIAGNOSIS — Z12.31 ENCOUNTER FOR SCREENING MAMMOGRAM FOR BREAST CANCER: ICD-10-CM

## 2023-02-27 DIAGNOSIS — Z12.11 SPECIAL SCREENING FOR MALIGNANT NEOPLASMS, COLON: ICD-10-CM

## 2023-02-27 LAB — TSH SERPL DL<=0.005 MIU/L-ACNC: 1.16 UIU/ML (ref 0.3–4.2)

## 2023-02-27 PROCEDURE — G0463 HOSPITAL OUTPT CLINIC VISIT: HCPCS

## 2023-02-27 PROCEDURE — 99214 OFFICE O/P EST MOD 30 MIN: CPT | Performed by: FAMILY MEDICINE

## 2023-02-27 RX ORDER — LORAZEPAM 0.5 MG/1
TABLET ORAL
Qty: 120 TABLET | Refills: 5 | Status: SHIPPED | OUTPATIENT
Start: 2023-02-27 | End: 2023-09-06

## 2023-02-27 RX ORDER — BUPROPION HYDROCHLORIDE 150 MG/1
150 TABLET ORAL EVERY MORNING
Qty: 60 TABLET | Refills: 1 | Status: SHIPPED | OUTPATIENT
Start: 2023-02-27 | End: 2023-05-22

## 2023-02-27 ASSESSMENT — ANXIETY QUESTIONNAIRES
6. BECOMING EASILY ANNOYED OR IRRITABLE: SEVERAL DAYS
7. FEELING AFRAID AS IF SOMETHING AWFUL MIGHT HAPPEN: SEVERAL DAYS
5. BEING SO RESTLESS THAT IT IS HARD TO SIT STILL: SEVERAL DAYS
GAD7 TOTAL SCORE: 7
GAD7 TOTAL SCORE: 7
3. WORRYING TOO MUCH ABOUT DIFFERENT THINGS: SEVERAL DAYS
4. TROUBLE RELAXING: SEVERAL DAYS
GAD7 TOTAL SCORE: 7
7. FEELING AFRAID AS IF SOMETHING AWFUL MIGHT HAPPEN: SEVERAL DAYS
8. IF YOU CHECKED OFF ANY PROBLEMS, HOW DIFFICULT HAVE THESE MADE IT FOR YOU TO DO YOUR WORK, TAKE CARE OF THINGS AT HOME, OR GET ALONG WITH OTHER PEOPLE?: SOMEWHAT DIFFICULT
IF YOU CHECKED OFF ANY PROBLEMS ON THIS QUESTIONNAIRE, HOW DIFFICULT HAVE THESE PROBLEMS MADE IT FOR YOU TO DO YOUR WORK, TAKE CARE OF THINGS AT HOME, OR GET ALONG WITH OTHER PEOPLE: SOMEWHAT DIFFICULT
2. NOT BEING ABLE TO STOP OR CONTROL WORRYING: SEVERAL DAYS
1. FEELING NERVOUS, ANXIOUS, OR ON EDGE: SEVERAL DAYS

## 2023-02-27 ASSESSMENT — ASTHMA QUESTIONNAIRES
QUESTION_5 LAST FOUR WEEKS HOW WOULD YOU RATE YOUR ASTHMA CONTROL: WELL CONTROLLED
QUESTION_3 LAST FOUR WEEKS HOW OFTEN DID YOUR ASTHMA SYMPTOMS (WHEEZING, COUGHING, SHORTNESS OF BREATH, CHEST TIGHTNESS OR PAIN) WAKE YOU UP AT NIGHT OR EARLIER THAN USUAL IN THE MORNING: NOT AT ALL
ACT_TOTALSCORE: 23
ACT_TOTALSCORE: 23
QUESTION_1 LAST FOUR WEEKS HOW MUCH OF THE TIME DID YOUR ASTHMA KEEP YOU FROM GETTING AS MUCH DONE AT WORK, SCHOOL OR AT HOME: NONE OF THE TIME
QUESTION_2 LAST FOUR WEEKS HOW OFTEN HAVE YOU HAD SHORTNESS OF BREATH: NOT AT ALL
QUESTION_4 LAST FOUR WEEKS HOW OFTEN HAVE YOU USED YOUR RESCUE INHALER OR NEBULIZER MEDICATION (SUCH AS ALBUTEROL): ONCE A WEEK OR LESS

## 2023-02-27 ASSESSMENT — PATIENT HEALTH QUESTIONNAIRE - PHQ9
10. IF YOU CHECKED OFF ANY PROBLEMS, HOW DIFFICULT HAVE THESE PROBLEMS MADE IT FOR YOU TO DO YOUR WORK, TAKE CARE OF THINGS AT HOME, OR GET ALONG WITH OTHER PEOPLE: SOMEWHAT DIFFICULT
SUM OF ALL RESPONSES TO PHQ QUESTIONS 1-9: 3
SUM OF ALL RESPONSES TO PHQ QUESTIONS 1-9: 3

## 2023-02-27 ASSESSMENT — PAIN SCALES - GENERAL: PAINLEVEL: MILD PAIN (2)

## 2023-02-27 NOTE — NURSING NOTE
"Chief Complaint   Patient presents with     RECHECK     Thyroid nodules, medication     Patient presents to the clinic for follow-up on MRI results and medication refills.    FOOD SECURITY SCREENING QUESTIONS:    The next two questions are to help us understand your food security.  If you are feeling you need any assistance in this area, we have resources available to support you today.    Hunger Vital Signs:  Within the past 12 months we worried whether our food would run out before we got money to buy more. Never  Within the past 12 months the food we bought just didn't last and we didn't have money to get more. Never    Advance Care Directive on file? No  Advance Care Directive provided to patient? Declined      Initial /88 (BP Location: Right arm, Patient Position: Sitting, Cuff Size: Adult Large)   Pulse 80   Temp 98  F (36.7  C) (Tympanic)   Resp 18   Wt 102.7 kg (226 lb 6.4 oz)   SpO2 99%   BMI 37.67 kg/m   Estimated body mass index is 37.67 kg/m  as calculated from the following:    Height as of 6/20/22: 1.651 m (5' 5\").    Weight as of this encounter: 102.7 kg (226 lb 6.4 oz).  Medication Reconciliation: complete        Pratima Lee  "

## 2023-02-27 NOTE — PROGRESS NOTES
"  Assessment & Plan     Thyroid nodule  Reviewed images with patient.  At this time we will check TSH and set her up for an ultrasound of her thyroid.  Further recommendation depending those results.  - TSH Reflex GH; Future  - US Thyroid; Future    Anxiety state  Discussed options and at this time we will have her titrate off Lexapro over the next 2 weeks.  We will also start Wellbutrin 150 mg daily.  She will follow-up in 2 months.  - buPROPion (WELLBUTRIN XL) 150 MG 24 hr tablet; Take 1 tablet (150 mg) by mouth every morning    Panic attack  Ativan is once again refilled.  - LORazepam (ATIVAN) 0.5 MG tablet; TAKE 1-2 TABLETS BY MOUTH DAILY AND 2 TABLETS NIGHTLYAS NEEDED FOR ANXIETY/SLEEP    Special screening for malignant neoplasms, colon  Referred for colonoscopy  - Colonoscopy Screening  Referral; Future    Encounter for screening mammogram for breast cancer  Referred for mammography.  - MA Screen Bilateral w/Jefry; Future             BMI:   Estimated body mass index is 37.67 kg/m  as calculated from the following:    Height as of 6/20/22: 1.651 m (5' 5\").    Weight as of this encounter: 102.7 kg (226 lb 6.4 oz).   Weight management plan: Discussed healthy diet and exercise guidelines    Lanre Shah MD  [facility]    Subjective   Aleks is a 47 year old accompanied by her boyfriend, presenting for the following health issues:  RECHECK (Thyroid nodules, medication)    She had an MRI of her cervical spine last year due to neck pain.  At that time she was instantly found to have thyroid nodules.  She last had her thyroid checked in 2021 which was unremarkable.    She also has a history of anxiety depression.  She is currently using Lexapro 20 mg daily.  She has had difficulty with vaginal dryness which been affecting intercourse.  She wants to discuss other options.    History of Present Illness       Reason for visit:  Meds and thyroid    She eats 2-3 servings of fruits and " vegetables daily.She consumes 2 sweetened beverage(s) daily.She exercises with enough effort to increase her heart rate 60 or more minutes per day.  She exercises with enough effort to increase her heart rate 4 days per week.   She is taking medications regularly.    Today's PHQ-9         PHQ-9 Total Score: 3    PHQ-9 Q9 Thoughts of better off dead/self-harm past 2 weeks :   Not at all    How difficult have these problems made it for you to do your work, take care of things at home, or get along with other people: Somewhat difficult  Today's DARYN-7 Score: 7             Review of Systems         Objective    /88 (BP Location: Right arm, Patient Position: Sitting, Cuff Size: Adult Large)   Pulse 80   Temp 98  F (36.7  C) (Tympanic)   Resp 18   Wt 102.7 kg (226 lb 6.4 oz)   SpO2 99%   BMI 37.67 kg/m    Body mass index is 37.67 kg/m .  Physical Exam   GENERAL: healthy, alert and no distress  NECK: no adenopathy, no asymmetry, masses, or scars and thyroid normal to palpation  PSYCH: mentation appears normal, affect normal/bright

## 2023-03-03 DIAGNOSIS — Z12.11 ENCOUNTER FOR SCREENING COLONOSCOPY: Primary | ICD-10-CM

## 2023-03-03 NOTE — TELEPHONE ENCOUNTER
Screening Questions for the Scheduling of Screening Colonoscopies   (If Colonoscopy is diagnostic, Provider should review the chart before scheduling.)  Are you younger than 50 or older than 80?  YES  Do you take aspirin or fish oil?  NO (if yes, tell patient to stop 1 week prior to Colonoscopy)  Do you take warfarin (Coumadin), clopidogrel (Plavix), apixaban (Eliquis), dabigatram (Pradaxa), rivaroxaban (Xarelto) or any blood thinner? NO  Do you use oxygen at home?  NO  Do you have kidney disease? NO  Are you on dialysis? NO  Have you had a stroke or heart attack in the last year? NO  Have you had a stent in your heart or any blood vessel in the last year? NO  Have you had a transplant of any organ? NO  Have you had a colonoscopy or upper endoscopy (EGD) before? NO  Date of scheduled Colonoscopy. 04/06/2023  Provider GRANADOS  [facility]arm[facility]

## 2023-03-06 RX ORDER — BISACODYL 5 MG
TABLET, DELAYED RELEASE (ENTERIC COATED) ORAL
Qty: 2 TABLET | Refills: 0 | Status: SHIPPED | OUTPATIENT
Start: 2023-03-06 | End: 2023-05-22

## 2023-03-06 RX ORDER — POLYETHYLENE GLYCOL 3350, SODIUM CHLORIDE, SODIUM BICARBONATE, POTASSIUM CHLORIDE 420; 11.2; 5.72; 1.48 G/4L; G/4L; G/4L; G/4L
4000 POWDER, FOR SOLUTION ORAL ONCE
Qty: 4000 ML | Refills: 0 | Status: SHIPPED | OUTPATIENT
Start: 2023-03-06 | End: 2023-03-06

## 2023-03-29 ENCOUNTER — HOSPITAL ENCOUNTER (OUTPATIENT)
Dept: MAMMOGRAPHY | Facility: OTHER | Age: 48
Discharge: HOME | End: 2023-03-29
Attending: FAMILY MEDICINE
Payer: COMMERCIAL

## 2023-03-29 ENCOUNTER — HOSPITAL ENCOUNTER (OUTPATIENT)
Dept: ULTRASOUND IMAGING | Facility: OTHER | Age: 48
Discharge: HOME | End: 2023-03-29
Attending: FAMILY MEDICINE
Payer: COMMERCIAL

## 2023-03-29 DIAGNOSIS — Z12.31 ENCOUNTER FOR SCREENING MAMMOGRAM FOR BREAST CANCER: ICD-10-CM

## 2023-03-29 DIAGNOSIS — E04.1 THYROID NODULE: ICD-10-CM

## 2023-03-29 PROCEDURE — 77067 SCR MAMMO BI INCL CAD: CPT

## 2023-03-29 PROCEDURE — 76536 US EXAM OF HEAD AND NECK: CPT

## 2023-03-30 ENCOUNTER — MYC MEDICAL ADVICE (OUTPATIENT)
Dept: FAMILY MEDICINE | Facility: OTHER | Age: 48
End: 2023-03-30
Payer: COMMERCIAL

## 2023-03-30 DIAGNOSIS — F41.1 ANXIETY STATE: Primary | ICD-10-CM

## 2023-03-30 RX ORDER — BUPROPION HYDROCHLORIDE 300 MG/1
300 TABLET ORAL EVERY MORNING
Qty: 90 TABLET | Refills: 4 | Status: SHIPPED | OUTPATIENT
Start: 2023-03-30 | End: 2023-11-13

## 2023-03-30 RX ORDER — BUPROPION HYDROCHLORIDE 150 MG/1
300 TABLET ORAL EVERY MORNING
Qty: 60 TABLET | Refills: 1 | Status: CANCELLED | OUTPATIENT
Start: 2023-03-30

## 2023-03-31 DIAGNOSIS — E04.1 THYROID NODULE: Primary | ICD-10-CM

## 2023-04-03 ENCOUNTER — MYC MEDICAL ADVICE (OUTPATIENT)
Dept: FAMILY MEDICINE | Facility: OTHER | Age: 48
End: 2023-04-03
Payer: COMMERCIAL

## 2023-04-03 DIAGNOSIS — Z01.89 ROUTINE LAB DRAW: Primary | ICD-10-CM

## 2023-04-03 RX ORDER — POLYETHYLENE GLYCOL 3350, SODIUM CHLORIDE, SODIUM BICARBONATE, POTASSIUM CHLORIDE 420; 11.2; 5.72; 1.48 G/4L; G/4L; G/4L; G/4L
4000 POWDER, FOR SOLUTION ORAL ONCE
COMMUNITY
Start: 2023-03-29 | End: 2023-05-22

## 2023-04-04 NOTE — TELEPHONE ENCOUNTER
I would recommend over-the-counter omeprazole or Pepcid.  If she does not want to do an over-the-counter medication then she should be seen in the office so we can discuss her concerns further and provide appropriate prescription. LEAH Melendez CNP on 4/4/2023 at 3:30 PM

## 2023-04-04 NOTE — TELEPHONE ENCOUNTER
Spoke with patient to clarify symptoms.. States symptoms are related to indigestion/ acid reflux (dopes have diagnosis of GERD). Did discuss with patient use of OTC medications for indigestion and non pharmacological methods. States she has used TUMS without relief. Did discuss other OTC options but patient states she prefers for medication to be prescribed by provider. Last office visit with PCP was 2/27/23. Please advise on next steps    Corinne R Thayer, RN on 4/4/2023 at 9:50 AM

## 2023-04-04 NOTE — TELEPHONE ENCOUNTER
Patient was called and notified of provider's response. States she will attempt OTC medication and call back with further concerns    Corinne R Thayer, RN on 4/4/2023 at 4:19 PM

## 2023-04-06 ENCOUNTER — ANESTHESIA EVENT (OUTPATIENT)
Dept: SURGERY | Facility: OTHER | Age: 48
End: 2023-04-06
Payer: COMMERCIAL

## 2023-04-06 ENCOUNTER — ANESTHESIA (OUTPATIENT)
Dept: SURGERY | Facility: OTHER | Age: 48
End: 2023-04-06
Payer: COMMERCIAL

## 2023-04-06 ENCOUNTER — HOSPITAL ENCOUNTER (OUTPATIENT)
Facility: OTHER | Age: 48
Discharge: HOME | End: 2023-04-06
Attending: SURGERY | Admitting: SURGERY
Payer: COMMERCIAL

## 2023-04-06 VITALS
TEMPERATURE: 98.6 F | OXYGEN SATURATION: 99 % | RESPIRATION RATE: 18 BRPM | SYSTOLIC BLOOD PRESSURE: 155 MMHG | HEART RATE: 75 BPM | DIASTOLIC BLOOD PRESSURE: 79 MMHG

## 2023-04-06 DIAGNOSIS — J45.20 MILD INTERMITTENT ASTHMA WITHOUT COMPLICATION: ICD-10-CM

## 2023-04-06 PROCEDURE — 250N000011 HC RX IP 250 OP 636: Performed by: NURSE ANESTHETIST, CERTIFIED REGISTERED

## 2023-04-06 PROCEDURE — 88305 TISSUE EXAM BY PATHOLOGIST: CPT

## 2023-04-06 PROCEDURE — 45380 COLONOSCOPY AND BIOPSY: CPT | Performed by: SURGERY

## 2023-04-06 PROCEDURE — 258N000003 HC RX IP 258 OP 636: Performed by: SURGERY

## 2023-04-06 PROCEDURE — 45385 COLONOSCOPY W/LESION REMOVAL: CPT | Mod: PT | Performed by: SURGERY

## 2023-04-06 PROCEDURE — 250N000009 HC RX 250: Performed by: NURSE ANESTHETIST, CERTIFIED REGISTERED

## 2023-04-06 PROCEDURE — 45385 COLONOSCOPY W/LESION REMOVAL: CPT | Performed by: NURSE ANESTHETIST, CERTIFIED REGISTERED

## 2023-04-06 RX ORDER — NALOXONE HYDROCHLORIDE 0.4 MG/ML
0.4 INJECTION, SOLUTION INTRAMUSCULAR; INTRAVENOUS; SUBCUTANEOUS
Status: CANCELLED | OUTPATIENT
Start: 2023-04-06

## 2023-04-06 RX ORDER — BECLOMETHASONE DIPROPIONATE HFA 40 UG/1
1 AEROSOL, METERED RESPIRATORY (INHALATION) 2 TIMES DAILY PRN
DISCHARGE
Start: 2023-04-06 | End: 2023-08-01

## 2023-04-06 RX ORDER — LIDOCAINE HYDROCHLORIDE 20 MG/ML
INJECTION, SOLUTION INFILTRATION; PERINEURAL PRN
Status: DISCONTINUED | OUTPATIENT
Start: 2023-04-06 | End: 2023-04-06

## 2023-04-06 RX ORDER — NALOXONE HYDROCHLORIDE 0.4 MG/ML
0.2 INJECTION, SOLUTION INTRAMUSCULAR; INTRAVENOUS; SUBCUTANEOUS
Status: CANCELLED | OUTPATIENT
Start: 2023-04-06

## 2023-04-06 RX ORDER — PROPOFOL 10 MG/ML
INJECTION, EMULSION INTRAVENOUS PRN
Status: DISCONTINUED | OUTPATIENT
Start: 2023-04-06 | End: 2023-04-06

## 2023-04-06 RX ORDER — SODIUM CHLORIDE, SODIUM LACTATE, POTASSIUM CHLORIDE, CALCIUM CHLORIDE 600; 310; 30; 20 MG/100ML; MG/100ML; MG/100ML; MG/100ML
INJECTION, SOLUTION INTRAVENOUS CONTINUOUS
Status: DISCONTINUED | OUTPATIENT
Start: 2023-04-06 | End: 2023-04-06 | Stop reason: HOSPADM

## 2023-04-06 RX ORDER — PROPOFOL 10 MG/ML
INJECTION, EMULSION INTRAVENOUS CONTINUOUS PRN
Status: DISCONTINUED | OUTPATIENT
Start: 2023-04-06 | End: 2023-04-06

## 2023-04-06 RX ORDER — LIDOCAINE 40 MG/G
CREAM TOPICAL
Status: DISCONTINUED | OUTPATIENT
Start: 2023-04-06 | End: 2023-04-06 | Stop reason: HOSPADM

## 2023-04-06 RX ORDER — FLUMAZENIL 0.1 MG/ML
0.2 INJECTION, SOLUTION INTRAVENOUS
Status: CANCELLED | OUTPATIENT
Start: 2023-04-06 | End: 2023-04-07

## 2023-04-06 RX ADMIN — SODIUM CHLORIDE, POTASSIUM CHLORIDE, SODIUM LACTATE AND CALCIUM CHLORIDE 30 ML/HR: 600; 310; 30; 20 INJECTION, SOLUTION INTRAVENOUS at 11:11

## 2023-04-06 RX ADMIN — MIDAZOLAM 2 MG: 1 INJECTION INTRAMUSCULAR; INTRAVENOUS at 11:11

## 2023-04-06 RX ADMIN — PROPOFOL 140 MCG/KG/MIN: 10 INJECTION, EMULSION INTRAVENOUS at 11:44

## 2023-04-06 RX ADMIN — PROPOFOL 80 MG: 10 INJECTION, EMULSION INTRAVENOUS at 11:44

## 2023-04-06 RX ADMIN — LIDOCAINE HYDROCHLORIDE 40 MG: 20 INJECTION, SOLUTION INFILTRATION; PERINEURAL at 11:44

## 2023-04-06 ASSESSMENT — ACTIVITIES OF DAILY LIVING (ADL)
ADLS_ACUITY_SCORE: 35
ADLS_ACUITY_SCORE: 35

## 2023-04-06 ASSESSMENT — LIFESTYLE VARIABLES: TOBACCO_USE: 1

## 2023-04-06 NOTE — ANESTHESIA CARE TRANSFER NOTE
Patient: Dnia Barraza    Procedure: Procedure(s):  COLONOSCOPY, WITH POLYPECTOMY AND BIOPSY       Diagnosis: Special screening for malignant neoplasms, colon [Z12.11]  Diagnosis Additional Information: No value filed.    Anesthesia Type:   MAC     Note:    Oropharynx: oropharynx clear of all foreign objects  Level of Consciousness: awake  Oxygen Supplementation: room air    Independent Airway: airway patency satisfactory and stable    Vital Signs Stable: post-procedure vital signs reviewed and stable  Report to RN Given: handoff report given  Patient transferred to: Phase II    Handoff Report: Identifed the Patient, Identified the Reponsible Provider, Reviewed the pertinent medical history, Discussed the surgical course, Reviewed Intra-OP anesthesia mangement and issues during anesthesia, Set expectations for post-procedure period and Allowed opportunity for questions and acknowledgement of understanding      Vitals:  Vitals Value Taken Time   /87 04/06/23 1218   Temp     Pulse 79 04/06/23 1218   Resp     SpO2 98 % 04/06/23 1226   Vitals shown include unvalidated device data.    Electronically Signed By: LEAH QUINONES CRNA  April 6, 2023  12:27 PM

## 2023-04-06 NOTE — OR NURSING
Patient has been discharged to home at 1320 via ambulation to care accompanied by Laly LOBO.    Written discharge instructions were provided to patient.  Prescriptions were none.  Patient states their pain is 0/10, and denies any nausea or dizziness upon discharge.    Patient and adult caring for them verbalize understanding of discharge instructions including no driving until tomorrow and no longer taking narcotic pain medications - no operating mechanical equipment and no making any important decisions.They understand reason for discharge, and necessary follow-up appointments.

## 2023-04-06 NOTE — ANESTHESIA PREPROCEDURE EVALUATION
Anesthesia Pre-Procedure Evaluation    Patient: Dina Barraza   MRN: [id number] : 1975        Procedure : Procedure(s):  Colonoscopy          Past Medical History:   Diagnosis Date     Major depressive disorder, single episode     During an unhealthy past marriage     Mild intermittent asthma, uncomplicated     Mild intermittent asthma      Past Surgical History:   Procedure Laterality Date     LAPAROSCOPIC CHOLECYSTECTOMY      02     LAPAROSCOPIC TUBAL LIGATION            Allergies   Allergen Reactions     Ranitidine Hives     Oxycodone Rash     Sulfa Drugs Rash     Sulfamethoxazole-Trimethoprim Rash      Social History     Tobacco Use     Smoking status: Every Day     Packs/day: 1.00     Years: 15.00     Pack years: 15.00     Types: Cigarettes     Last attempt to quit: 2018     Years since quittin.2     Smokeless tobacco: Never   Vaping Use     Vaping status: Never Used   Substance Use Topics     Alcohol use: Not Currently     Comment: rare      Wt Readings from Last 1 Encounters:   23 102.7 kg (226 lb 6.4 oz)        Anesthesia Evaluation   Pt has had prior anesthetic.     No history of anesthetic complications       ROS/MED HX  ENT/Pulmonary:     (+) tobacco use, Current use, Mild Persistent, asthma Treatment: Inhaler daily,      Neurologic:  - neg neurologic ROS     Cardiovascular:     (+) hypertension-----    METS/Exercise Tolerance: >4 METS    Hematologic:  - neg hematologic  ROS     Musculoskeletal:  - neg musculoskeletal ROS     GI/Hepatic:     (+) GERD,     Renal/Genitourinary:  - neg Renal ROS     Endo:     (+) Obesity,     Psychiatric/Substance Use:     (+) psychiatric history anxiety     Infectious Disease:  - neg infectious disease ROS     Malignancy:  - neg malignancy ROS     Other:  - neg other ROS          Physical Exam    Airway  airway exam normal      Mallampati: II   TM distance: > 3 FB   Neck ROM: full   Mouth opening: > 3 cm    Respiratory Devices and  Support         Dental       (+) Minor Abnormalities - some fillings, tiny chips      Cardiovascular   cardiovascular exam normal       Rhythm and rate: regular and normal     Pulmonary   pulmonary exam normal        breath sounds clear to auscultation           OUTSIDE LABS:  CBC:   Lab Results   Component Value Date    WBC 8.8 08/19/2021    WBC 8.9 06/25/2019    HGB 14.7 08/19/2021    HGB 13.0 06/25/2019    HCT 44.8 08/19/2021    HCT 37.9 06/25/2019     08/19/2021     06/25/2019     BMP:   Lab Results   Component Value Date     02/22/2023     08/19/2021    POTASSIUM 4.5 02/22/2023    POTASSIUM 4.0 08/19/2021    CHLORIDE 103 02/22/2023    CHLORIDE 105 08/19/2021    CO2 28 02/22/2023    CO2 29 08/19/2021    BUN 16.3 02/22/2023    BUN 8 08/19/2021    CR 0.61 02/22/2023    CR 0.56 (L) 08/19/2021     (H) 02/22/2023     08/19/2021     COAGS:   Lab Results   Component Value Date    INR 1.00 06/25/2019     POC:   Lab Results   Component Value Date    HCG Negative 09/01/2021     HEPATIC:   Lab Results   Component Value Date    ALBUMIN 4.3 02/22/2023    PROTTOTAL 7.1 02/22/2023    ALT 15 02/22/2023    AST 15 02/22/2023    ALKPHOS 88 02/22/2023    BILITOTAL 0.5 02/22/2023     OTHER:   Lab Results   Component Value Date    WAI 9.1 02/22/2023    LIPASE <3 (L) 06/25/2019    TSH 1.16 02/22/2023       Anesthesia Plan    ASA Status:  2   NPO Status:  NPO Appropriate    Anesthesia Type: MAC.   Induction: Propofol.   Maintenance: Balanced.        Consents    Anesthesia Plan(s) and associated risks, benefits, and realistic alternatives discussed. Questions answered and patient/representative(s) expressed understanding.     - Discussed: Risks, Benefits and Alternatives for BOTH SEDATION and the PROCEDURE were discussed     - Discussed with:  Patient      - Extended Intubation/Ventilatory Support Discussed: No.      - Patient is DNR/DNI Status: No    Use of blood products discussed: Yes.     -  Discussed with: Patient.     - Consented: consented to blood products            Reason for refusal: other.     Postoperative Care            Comments:                LEAH SIMPSON CRNA

## 2023-04-06 NOTE — ANESTHESIA POSTPROCEDURE EVALUATION
Patient: Dina Barraza    Procedure: Procedure(s):  COLONOSCOPY, WITH POLYPECTOMY AND BIOPSY       Anesthesia Type:  MAC    Note:  Disposition: Outpatient   Postop Pain Control: Uneventful            Sign Out: Well controlled pain   PONV: No   Neuro/Psych: Uneventful            Sign Out: Acceptable/Baseline neuro status   Airway/Respiratory: Uneventful            Sign Out: Acceptable/Baseline resp. status   CV/Hemodynamics: Uneventful            Sign Out: Acceptable CV status; No obvious hypovolemia; No obvious fluid overload   Other NRE: NONE   DID A NON-ROUTINE EVENT OCCUR?            Last vitals:  Vitals Value Taken Time   /88 04/06/23 1230   Temp 98.8  F (37.1  C) 04/06/23 1225   Pulse 76 04/06/23 1230   Resp     SpO2 100 % 04/06/23 1237   Vitals shown include unvalidated device data.    Electronically Signed By: LEAH QUINONES CRNA  April 6, 2023  12:38 PM

## 2023-04-06 NOTE — H&P
PRE-PROCEDURE NOTE    CHIEFCOMPLAINT / REASON FOR PROCEDURE:  Screening for polyps and colorectal cancer.    PERTINENT HISTORY   Patient is due for colonoscopy. Previous colonoscopy none. No  family history of colon polyps or colon cancer.    Past Medical History:   Diagnosis Date     Major depressive disorder, single episode     During an unhealthy past marriage     Mild intermittent asthma, uncomplicated     Mild intermittent asthma       Past Surgical History:   Procedure Laterality Date     LAPAROSCOPIC CHOLECYSTECTOMY      08/09/02     LAPAROSCOPIC TUBAL LIGATION      2005         Other:  None  Bleeding tendencies: No     Relevant Family History:  None     Relevant Social History:  None     10 point ROS of systems including Constitutional, Eyes, Respiratory, Cardiovascular, Gastroenterology, Genitourinary, Integumentary, Muscularskeletal, Psychiatric were all negative except for pertinent positives noted in my HPI.      ALLERGIES/SENSITIVITIES:   Allergies   Allergen Reactions     Ranitidine Hives     Oxycodone Rash     Sulfa Drugs Rash     Sulfamethoxazole-Trimethoprim Rash        CURRENT MEDICATIONS:    No current facility-administered medications on file prior to encounter.  albuterol (PROAIR HFA/PROVENTIL HFA/VENTOLIN HFA) 108 (90 Base) MCG/ACT inhaler, Inhale 1-2 puffs into the lungs every 6 hours as needed for shortness of breath / dyspnea or wheezing  beclomethasone HFA (QVAR REDIHALER) 40 MCG/ACT inhaler, Inhale 1 puff into the lungs 2 times daily (Patient taking differently: Inhale 1 puff into the lungs 2 times daily as needed)  bisacodyl (DULCOLAX) 5 MG EC tablet, Use as directed per colonoscopy prep.  buPROPion (WELLBUTRIN XL) 150 MG 24 hr tablet, Take 1 tablet (150 mg) by mouth every morning  LORazepam (ATIVAN) 0.5 MG tablet, TAKE 1-2 TABLETS BY MOUTH DAILY AND 2 TABLETS NIGHTLYAS NEEDED FOR ANXIETY/SLEEP            PRE-SEDATION ASSESSMENT:    LUNGS:  CTA B/L, no wheezing or crackles.  Heart & CV:   RRR no murmur.  Intact distal pulses, good cap refill.    Comment(s):      IMPRESSION: 47 year old female in need of screening colonoscopy.    PLAN:  I discussed screening colonoscopy with the patient. Anesthesia coverage requested .    Gilson Conklin MD    4/6/2023 10:30 AM

## 2023-04-06 NOTE — OP NOTE
PROCEDURE NOTE    SURGEON:Gilson Conklin MD    PRE-OP DIAGNOSIS:  Screening Colonoscopy      POST-OP DIAGNOSIS: Colon polyps    PROCEDURE: Colonoscopy with cold snare polypectomy    SPECIMEN:      ID Type Source Tests Collected by Time Destination   1 : ASCENDING COLON POLYP Polyp Large Intestine, Colon, Ascending SURGICAL PATHOLOGY EXAM Gilson Conklin MD 4/6/2023 12:03 PM    2 : SIGMOID POLYP Polyp Large Intestine, Colon, Sigmoid SURGICAL PATHOLOGY EXAM Gilson Conklin MD 4/6/2023 12:07 PM        ANESTHESIA:  MAC CRNA Independent: Camille Dean APRN CRNA   Coverage requested    ESTIMATED BLOOD LOSS: none    COMPLICATIONS:  None    INDICATION FOR THE PROCEDURE: The patient is a 47 year old female. The patient presents with need for screening. I explained to the patient the risks, benefits and alternatives to screening colonoscopy for evaluating for cancer or polyps. We discussed the risks including bleeding, perforation, potential inability to reach the cecum and the risks of sedation. The patient's questions were answered and the patient wished to proceed. Informed consent paperwork was completed.    PROCEDURE: The patient was taken to the endoscopy suite. Appropriate monitors were attached. The patient was placed in the left lateral decubitus position. Timeout was performed confirming the patient's identity and procedure to be performed.  After appropriate sedation was confirmed, digital rectal exam was performed.  There was normal tone and no gross abnormality was noted.  The lubricated colonoscope was introduced into the anus the colon was insufflated with air. The prep quality was adequate. Under direct visualization the scope was advanced to the cecum.  In the ascending colon a 4 mm polyp was removed with cold snare.  In the sigmoid: 3 polyps measuring 2 to 5 mm were removed with cold snare.  No abnormalities were noted. The scope was retroflexed in the rectum and the anorectal junction was inspected.  No abnormalities were noted. The scope was returned to a neutral position and the colon was decompressed. The scope was removed. The patient tolerated the procedure with no immediately apparent complication. The patient was taken to recovery in stable condition.    FOLLOW UP: RECOMMEND high fiber diet, will call with pathology results.       Gilson Conklin MD on 4/6/2023 at 12:13 PM

## 2023-04-07 LAB
PATH REPORT.COMMENTS IMP SPEC: NORMAL
PATH REPORT.FINAL DX SPEC: NORMAL
PATH REPORT.RELEVANT HX SPEC: NORMAL
PHOTO IMAGE: NORMAL

## 2023-04-12 ENCOUNTER — HOSPITAL ENCOUNTER (OUTPATIENT)
Dept: ULTRASOUND IMAGING | Facility: OTHER | Age: 48
Discharge: HOME | End: 2023-04-12
Attending: FAMILY MEDICINE
Payer: COMMERCIAL

## 2023-04-12 ENCOUNTER — HOSPITAL ENCOUNTER (OUTPATIENT)
Dept: MAMMOGRAPHY | Facility: OTHER | Age: 48
Discharge: HOME | End: 2023-04-12
Attending: FAMILY MEDICINE
Payer: COMMERCIAL

## 2023-04-12 DIAGNOSIS — R92.8 ABNORMAL FINDING ON BREAST IMAGING: ICD-10-CM

## 2023-04-12 PROCEDURE — 76642 ULTRASOUND BREAST LIMITED: CPT | Mod: RT

## 2023-04-12 PROCEDURE — 77061 BREAST TOMOSYNTHESIS UNI: CPT | Mod: RT

## 2023-04-26 ENCOUNTER — NURSE TRIAGE (OUTPATIENT)
Dept: NURSING | Facility: CLINIC | Age: 48
End: 2023-04-26
Payer: COMMERCIAL

## 2023-04-26 NOTE — TELEPHONE ENCOUNTER
"Caller reporting hematuria and  bladder \"spasm\" pain after voiding  with frequency ; no dysuria, onset late today and worsening; unknown fever   Triage protocol revieiwed   Advised to be seen within  24 hrs; sooner if fever  or worsening symptoms   Caller states she has a thyroid BX and lab tests tomorrow   advised to be seen prior to  this   Understands and will proceed to [facility] now to be seen in UC if able   Na Rao RN  FNA             Reason for Disposition    Blood in the urine is main symptom    Pain or burning with passing urine    Additional Information    Negative: Shock suspected (e.g., cold/pale/clammy skin, too weak to stand, low BP, rapid pulse)    Negative: Sounds like a life-threatening emergency to the triager    Negative: Followed a female genital area injury (e.g., vagina, vulva)    Negative: Followed a male genital area injury (e.g., penis, scrotum)    Negative: Vaginal discharge    Negative: Pus (white, yellow) or bloody discharge from end of penis    Negative: [1] Taking antibiotic for urinary tract infection (UTI) AND [2] female    Negative: [1] Taking antibiotic for urinary tract infection (UTI) AND [2] male    Negative: [1] Pain or burning with passing urine (urination) AND [2] pregnant    Negative: [1] Pain or burning with passing urine (urination) AND [2] postpartum (from 0 to 6 weeks after delivery)    Negative: [1] Pain or burning with passing urine (urination) AND [2] female    Negative: [1] Pain or burning with passing urine (urination) AND [2] male    Negative: Pain or itching in the vulvar area    Negative: Pain in scrotum is main symptom    Negative: Shock suspected (e.g., cold/pale/clammy skin, too weak to stand, low BP, rapid pulse)    Negative: Sounds like a life-threatening emergency to the triager    Negative: Urinary catheter, questions about    Negative: Recent back or abdominal injury    Negative: Recent genital injury    Negative: [1] Unable to urinate (or only a few " drops) > 4 hours AND [2] bladder feels very full (e.g., palpable bladder or strong urge to urinate)    Negative: Passing pure blood or large blood clots (i.e., size > a dime) (Exception: vikki or small strands)    Negative: Fever > 100.4 F (38.0 C)    Negative: Patient sounds very sick or weak to the triager    Negative: [1] Pain or burning with passing urine AND [2] side (flank) or back pain present    Negative: Known sickle cell disease    Negative: Taking Coumadin (warfarin) or other strong blood thinner, or known bleeding disorder (e.g., thrombocytopenia)    Protocols used: URINARY SYMPTOMS-A-AH, URINE - BLOOD IN-A-AH

## 2023-04-27 ENCOUNTER — LAB (OUTPATIENT)
Dept: LAB | Facility: OTHER | Age: 48
End: 2023-04-27
Attending: RADIOLOGY
Payer: COMMERCIAL

## 2023-04-27 ENCOUNTER — E-VISIT (OUTPATIENT)
Dept: URGENT CARE | Facility: CLINIC | Age: 48
End: 2023-04-27
Payer: COMMERCIAL

## 2023-04-27 ENCOUNTER — HOSPITAL ENCOUNTER (OUTPATIENT)
Dept: ULTRASOUND IMAGING | Facility: OTHER | Age: 48
Discharge: HOME | End: 2023-04-27
Attending: FAMILY MEDICINE
Payer: COMMERCIAL

## 2023-04-27 VITALS
HEART RATE: 80 BPM | RESPIRATION RATE: 16 BRPM | OXYGEN SATURATION: 97 % | SYSTOLIC BLOOD PRESSURE: 133 MMHG | TEMPERATURE: 98.3 F | DIASTOLIC BLOOD PRESSURE: 86 MMHG

## 2023-04-27 DIAGNOSIS — Z01.89 ROUTINE LAB DRAW: ICD-10-CM

## 2023-04-27 DIAGNOSIS — N39.0 ACUTE UTI (URINARY TRACT INFECTION): Primary | ICD-10-CM

## 2023-04-27 DIAGNOSIS — E04.1 THYROID NODULE: ICD-10-CM

## 2023-04-27 LAB
HOLD SPECIMEN: NORMAL
HOLD SPECIMEN: NORMAL
PLATELET # BLD AUTO: 231 10E3/UL (ref 150–450)

## 2023-04-27 PROCEDURE — 10005 FNA BX W/US GDN 1ST LES: CPT

## 2023-04-27 PROCEDURE — 99421 OL DIG E/M SVC 5-10 MIN: CPT | Performed by: FAMILY MEDICINE

## 2023-04-27 PROCEDURE — 85049 AUTOMATED PLATELET COUNT: CPT | Mod: ZL

## 2023-04-27 PROCEDURE — 88173 CYTOPATH EVAL FNA REPORT: CPT

## 2023-04-27 PROCEDURE — 250N000009 HC RX 250: Performed by: RADIOLOGY

## 2023-04-27 PROCEDURE — 36415 COLL VENOUS BLD VENIPUNCTURE: CPT | Mod: ZL

## 2023-04-27 RX ORDER — NITROFURANTOIN 25; 75 MG/1; MG/1
100 CAPSULE ORAL 2 TIMES DAILY
Qty: 10 CAPSULE | Refills: 0 | Status: SHIPPED | OUTPATIENT
Start: 2023-04-27 | End: 2023-05-02

## 2023-04-27 RX ADMIN — LIDOCAINE HYDROCHLORIDE 10 ML: 10 INJECTION, SOLUTION INFILTRATION; PERINEURAL at 13:46

## 2023-04-27 NOTE — DISCHARGE INSTRUCTIONS
ULTRASOUND GUIDED THYROID BIOPSY    Ultrasound guided thyroid biopsy is a procedure which involves the insertion of a small needle into your thyroid gland to withdraw a small amount of tissue that will be examined by a pathologist. Your doctor will notify you of the results of your biopsy, usually within a few days. Because this procedure requires the insertion of a needle into your thyroid gland, there is a small risk of bleeding, local tenderness and rarely infection.    ACTIVITY: Most patients can return to work the day, however, avoid any vigorous physical activity for 24 hours.    COMFORT: If you experience discomfort or tenderness at the site, you may take  Your usual or recommended pain medication. Do not take aspirin the day of the procedure. You may feel more comfortable lying with your head partially raised. Avoid strain on your neck to support your head while you sit up.    DIET: You may resume your normal diet.    CARE OF BIOPSY SITE: Occasionally, a patient may have a small amount of bleeding from the needle puncture site. If this happens, you should lie down and apply gentle direct pressure to the bleeding site for about 10 minutes. When the bleeding has stopped, apply another clean band-aid.  Keep the bandage on for 24 hours. Then you may remove the bandage and shower. You may put on a clean band-aid or leave it open to air.    RETURN TO AN EMERGENCY ROOM FOR:   * persistent bleeding  * difficulty breathing or neck swelling    CALL YOUR DOCTOR FOR:   For questions, problems or concerns, contact the Radiology Department at [phone].  * a fever over 101 degrees  * Increased redness, increased swelling, and/or persistent drainage or discomfort around the site

## 2023-04-27 NOTE — PATIENT INSTRUCTIONS
Dear Dina Barraza    After reviewing your responses, I've been able to diagnose you with a urinary tract infection, which is a common infection of the bladder with bacteria.  This is not a sexually transmitted infection, though urinating immediately after intercourse can help prevent infections.  Drinking lots of fluids is also helpful to clear your current infection and prevent the next one.      I have sent a prescription for antibiotics to your pharmacy to treat this infection.    It is important that you take all of your prescribed medication even if your symptoms are improving after a few doses.  Taking all of your medicine helps prevent the symptoms from returning.     If your symptoms worsen, you develop pain in your back or stomach, develop fevers, or are not improving in 5 days, please contact your primary care provider for an appointment or visit any of our convenient Walk-in or Urgent Care Centers to be seen, which can be found on our website here.    Thanks again for choosing us as your health care partner,    Lucero Paz MD   [facility]

## 2023-04-27 NOTE — PROGRESS NOTES
Total of 5 samples taken.  Cytology personnel in room: yes, Mikayla  Insertion complications: no  Patient tolerated the procedure:  yes  Bandaide applied:  yes

## 2023-05-04 ENCOUNTER — MYC MEDICAL ADVICE (OUTPATIENT)
Dept: FAMILY MEDICINE | Facility: OTHER | Age: 48
End: 2023-05-04
Payer: COMMERCIAL

## 2023-05-04 LAB
PATH REPORT.COMMENTS IMP SPEC: NORMAL
PATH REPORT.FINAL DX SPEC: NORMAL
PHOTO IMAGE: NORMAL

## 2023-05-22 ENCOUNTER — OFFICE VISIT (OUTPATIENT)
Dept: FAMILY MEDICINE | Facility: OTHER | Age: 48
End: 2023-05-22
Attending: FAMILY MEDICINE
Payer: COMMERCIAL

## 2023-05-22 VITALS
BODY MASS INDEX: 37.97 KG/M2 | RESPIRATION RATE: 16 BRPM | SYSTOLIC BLOOD PRESSURE: 170 MMHG | OXYGEN SATURATION: 99 % | DIASTOLIC BLOOD PRESSURE: 120 MMHG | HEART RATE: 78 BPM | WEIGHT: 228.2 LBS | TEMPERATURE: 98.6 F

## 2023-05-22 DIAGNOSIS — Z11.3 SCREEN FOR STD (SEXUALLY TRANSMITTED DISEASE): ICD-10-CM

## 2023-05-22 DIAGNOSIS — I10 ESSENTIAL HYPERTENSION: ICD-10-CM

## 2023-05-22 DIAGNOSIS — E04.1 THYROID NODULE: ICD-10-CM

## 2023-05-22 DIAGNOSIS — F41.1 ANXIETY STATE: Primary | ICD-10-CM

## 2023-05-22 LAB
HIV 1+2 AB+HIV1P24 AG SERPLBLD IA.RAPID: NON REACTIVE
HIV 1+2 AB+HIV1P24 AG SERPLBLD IA.RAPID: NON REACTIVE
HIV INTERPRETATION: NORMAL

## 2023-05-22 PROCEDURE — 99214 OFFICE O/P EST MOD 30 MIN: CPT | Performed by: FAMILY MEDICINE

## 2023-05-22 PROCEDURE — G0463 HOSPITAL OUTPT CLINIC VISIT: HCPCS

## 2023-05-22 PROCEDURE — 87806 HIV AG W/HIV1&2 ANTB W/OPTIC: CPT | Mod: ZL | Performed by: FAMILY MEDICINE

## 2023-05-22 PROCEDURE — 86780 TREPONEMA PALLIDUM: CPT | Mod: ZL | Performed by: FAMILY MEDICINE

## 2023-05-22 PROCEDURE — 36415 COLL VENOUS BLD VENIPUNCTURE: CPT | Mod: ZL | Performed by: FAMILY MEDICINE

## 2023-05-22 RX ORDER — FLUOXETINE 10 MG/1
10 CAPSULE ORAL DAILY
Qty: 60 CAPSULE | Refills: 3 | Status: SHIPPED | OUTPATIENT
Start: 2023-05-22 | End: 2023-07-24

## 2023-05-22 ASSESSMENT — ASTHMA QUESTIONNAIRES
QUESTION_1 LAST FOUR WEEKS HOW MUCH OF THE TIME DID YOUR ASTHMA KEEP YOU FROM GETTING AS MUCH DONE AT WORK, SCHOOL OR AT HOME: NONE OF THE TIME
ACT_TOTALSCORE: 22
QUESTION_3 LAST FOUR WEEKS HOW OFTEN DID YOUR ASTHMA SYMPTOMS (WHEEZING, COUGHING, SHORTNESS OF BREATH, CHEST TIGHTNESS OR PAIN) WAKE YOU UP AT NIGHT OR EARLIER THAN USUAL IN THE MORNING: ONCE OR TWICE
QUESTION_4 LAST FOUR WEEKS HOW OFTEN HAVE YOU USED YOUR RESCUE INHALER OR NEBULIZER MEDICATION (SUCH AS ALBUTEROL): ONCE A WEEK OR LESS
ACT_TOTALSCORE: 22
QUESTION_5 LAST FOUR WEEKS HOW WOULD YOU RATE YOUR ASTHMA CONTROL: WELL CONTROLLED
QUESTION_2 LAST FOUR WEEKS HOW OFTEN HAVE YOU HAD SHORTNESS OF BREATH: NOT AT ALL

## 2023-05-22 ASSESSMENT — PAIN SCALES - GENERAL: PAINLEVEL: NO PAIN (0)

## 2023-05-22 NOTE — PROGRESS NOTES
Assessment & Plan     Anxiety state  We discussed options at this time she would like to add Prozac 10 mg daily to Wellbutrin.  Potential side effects were discussed.  She will follow-up in 8 weeks for reassessment.  - FLUoxetine (PROZAC) 10 MG capsule; Take 1 capsule (10 mg) by mouth daily    Thyroid nodule  Reviewed pathology results.  We will plan on rechecking TSH again in 1 year.    Essential hypertension  She does not typically have hypertension and checks this at work.  She will continue checking at work and contact me if it is consistently this elevated.  Discussed dietary recommendations.    Screen for STD (sexually transmitted disease)  We will get labs for HIV and syphilis.  Offered repeat gonorrhea chlamydia which she declined.  - HIV Rapid Antibody Screen; Future  - Treponema Ab w Reflex to RPR and Titer; Future  - HIV Rapid Antibody Screen  - Treponema Ab w Reflex to RPR and Titer             Nicotine/Tobacco Cessation:  She reports that she has been smoking cigarettes. She has a 15.00 pack-year smoking history. She has never used smokeless tobacco.  Nicotine/Tobacco Cessation Plan:   Information offered: Patient not interested at this time          No follow-ups on file.    Lanre Shah MD  [facility] is a 48 year old, presenting for the following health issues:  Recheck Medication    She has a history of anxiety.  At her last visit we increased Wellbutrin to 300 mg daily.  She is tolerated this well.  She feels that this has been somewhat helpful but she continues to have some symptoms of anxiety and a short fuse.  No side effects.    Has a history of elevated blood pressure in the past, does not check her blood pressure on a regular basis but when she does check it at work it tends to be in the normal range.    She would like to be tested for HIV and is a former boyfriend cheated on her.  She had negative gonorrhea and chlamydia in August 2022 and has  had no new partner since then.        5/22/2023     3:06 PM   Additional Questions   Roomed by Ilene IYER CMA     HPI             Review of Systems         Objective    BP (!) 170/120 (BP Location: Right arm, Patient Position: Sitting, Cuff Size: Adult Large)   Pulse 78   Temp 98.6  F (37  C) (Tympanic)   Resp 16   Wt 103.5 kg (228 lb 3.2 oz)   LMP 05/10/2023 (Approximate)   SpO2 99%   BMI 37.97 kg/m    Body mass index is 37.97 kg/m .  Physical Exam   GENERAL: healthy, alert and no distress

## 2023-05-23 ENCOUNTER — MYC MEDICAL ADVICE (OUTPATIENT)
Dept: FAMILY MEDICINE | Facility: OTHER | Age: 48
End: 2023-05-23
Payer: COMMERCIAL

## 2023-05-23 VITALS — SYSTOLIC BLOOD PRESSURE: 123 MMHG | DIASTOLIC BLOOD PRESSURE: 74 MMHG

## 2023-05-24 LAB — T PALLIDUM AB SER QL: NONREACTIVE

## 2023-07-24 ENCOUNTER — OFFICE VISIT (OUTPATIENT)
Dept: FAMILY MEDICINE | Facility: OTHER | Age: 48
End: 2023-07-24
Attending: FAMILY MEDICINE
Payer: COMMERCIAL

## 2023-07-24 VITALS
WEIGHT: 225.6 LBS | DIASTOLIC BLOOD PRESSURE: 108 MMHG | HEART RATE: 86 BPM | TEMPERATURE: 98.2 F | OXYGEN SATURATION: 98 % | BODY MASS INDEX: 37.54 KG/M2 | SYSTOLIC BLOOD PRESSURE: 154 MMHG

## 2023-07-24 DIAGNOSIS — F41.1 ANXIETY STATE: Primary | ICD-10-CM

## 2023-07-24 DIAGNOSIS — E66.01 MORBID OBESITY (H): ICD-10-CM

## 2023-07-24 DIAGNOSIS — I10 ESSENTIAL HYPERTENSION: ICD-10-CM

## 2023-07-24 PROCEDURE — 99213 OFFICE O/P EST LOW 20 MIN: CPT | Performed by: FAMILY MEDICINE

## 2023-07-24 PROCEDURE — G0463 HOSPITAL OUTPT CLINIC VISIT: HCPCS | Performed by: FAMILY MEDICINE

## 2023-07-24 ASSESSMENT — ANXIETY QUESTIONNAIRES
3. WORRYING TOO MUCH ABOUT DIFFERENT THINGS: MORE THAN HALF THE DAYS
GAD7 TOTAL SCORE: 10
7. FEELING AFRAID AS IF SOMETHING AWFUL MIGHT HAPPEN: SEVERAL DAYS
IF YOU CHECKED OFF ANY PROBLEMS ON THIS QUESTIONNAIRE, HOW DIFFICULT HAVE THESE PROBLEMS MADE IT FOR YOU TO DO YOUR WORK, TAKE CARE OF THINGS AT HOME, OR GET ALONG WITH OTHER PEOPLE: SOMEWHAT DIFFICULT
4. TROUBLE RELAXING: SEVERAL DAYS
5. BEING SO RESTLESS THAT IT IS HARD TO SIT STILL: SEVERAL DAYS
2. NOT BEING ABLE TO STOP OR CONTROL WORRYING: MORE THAN HALF THE DAYS
1. FEELING NERVOUS, ANXIOUS, OR ON EDGE: MORE THAN HALF THE DAYS
GAD7 TOTAL SCORE: 10
6. BECOMING EASILY ANNOYED OR IRRITABLE: SEVERAL DAYS

## 2023-07-24 ASSESSMENT — PAIN SCALES - GENERAL: PAINLEVEL: NO PAIN (0)

## 2023-07-24 NOTE — PROGRESS NOTES
Follow-up SUBJECTIVE:  Dina Barraza is a 48 year old female here for follow-up.  She has a history of anxiety.  At her last visit we added Prozac 10 mg daily to Wellbutrin.  She did not notice any significant improvement.    She has a history of elevated blood pressure readings.  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  She has once again resumed smoking and is unsure if she takes an extra salt.    ROS:    As above otherwise ROS is unremarkable.    OBJECTIVE:  BP (!) 154/108   Pulse 86   Temp 98.2  F (36.8  C) (Tympanic)   Wt 102.3 kg (225 lb 9.6 oz)   SpO2 98%   BMI 37.54 kg/m      EXAM:  General Appearance: Pleasant, alert, appropriate appearance for age. No acute distress  Psych: No hallucinations.  No psychomotor agitation retardation.  No thoughts of harming yourself or others.    ASSESSEMENT AND PLAN:    1. Anxiety state    2. Essential hypertension    3. Morbid obesity (H)      For her anxiety will increase Prozac to 20 mg daily in addition to Wellbutrin 300 mg daily.  She will follow-up in 2 months for reassessment.    She will start monitoring her blood pressure more consistently at home and at work.  Offered amatory blood pressure monitoring which she declined.  Discussed limiting her salt intake.  Discussed importance of quitting smoking.    Tobias Shah MD  Family MedicineAnswers submitted by the patient for this visit:  DARYN-7 (Submitted on 7/24/2023)  DARYN 7 TOTAL SCORE: 10  Depression / Anxiety Questionnaire (Submitted on 7/24/2023)  Chief Complaint: Chronic problems general questions HPI Form  Depression/Anxiety: Anxiety  Anxiety only (Submitted on 7/24/2023)  Chief Complaint: Chronic problems general questions HPI Form  Anxiety since last: : no change  Other associated symotome: : Yes  Significant life event: : financial concerns  Anxious:: Yes  Current substance use:: No  General Questionnaire  (Submitted on 7/24/2023)  Chief Complaint: Chronic problems general questions HPI Form  How many servings of fruits and vegetables do you eat daily?: 2-3  On average, how many sweetened beverages do you drink each day (Examples: soda, juice, sweet tea, etc.  Do NOT count diet or artificially sweetened beverages)?: 1  How many minutes a day do you exercise enough to make your heart beat faster?: 20 to 29  How many days a week do you exercise enough to make your heart beat faster?: 3 or less  How many days per week do you miss taking your medication?: 1

## 2023-07-24 NOTE — NURSING NOTE
Patient presents to clinic for follow up for medication for anxiety  BP (!) 160/110   Pulse 86   Temp 98.2  F (36.8  C) (Tympanic)   Wt 102.3 kg (225 lb 9.6 oz)   SpO2 98%   BMI 37.54 kg/m    Cadence Conklin LPN on 7/24/2023 at 3:11 PM

## 2023-07-31 DIAGNOSIS — J45.20 MILD INTERMITTENT ASTHMA WITHOUT COMPLICATION: ICD-10-CM

## 2023-08-01 RX ORDER — BECLOMETHASONE DIPROPIONATE HFA 40 UG/1
AEROSOL, METERED RESPIRATORY (INHALATION)
Qty: 10.6 G | Refills: 11 | Status: SHIPPED | OUTPATIENT
Start: 2023-08-01

## 2023-09-03 DIAGNOSIS — F41.0 PANIC ATTACK: ICD-10-CM

## 2023-09-06 ENCOUNTER — MYC MEDICAL ADVICE (OUTPATIENT)
Dept: FAMILY MEDICINE | Facility: OTHER | Age: 48
End: 2023-09-06
Payer: COMMERCIAL

## 2023-09-06 DIAGNOSIS — F41.0 PANIC ATTACK: ICD-10-CM

## 2023-09-06 RX ORDER — LORAZEPAM 0.5 MG/1
TABLET ORAL
Qty: 120 TABLET | Refills: 5 | Status: SHIPPED | OUTPATIENT
Start: 2023-09-06 | End: 2024-01-29

## 2023-09-06 NOTE — TELEPHONE ENCOUNTER
Requested Prescriptions   Pending Prescriptions Disp Refills    LORazepam (ATIVAN) 0.5 MG tablet 120 tablet 5     Sig: TAKE 1-2 TABLETS BY MOUTH DAILY AND 2 TABLETS NIGHTLYAS NEEDED FOR ANXIETY/SLEEP       There is no refill protocol information for this order          Last Prescription Date:   2/27/2023  Last Fill Qty/Refills:         120, R-5    Last Office Visit:              7/24/2023   Future Office visit:           9/25/2023    Jasmin Preciado RN on 9/6/2023 at 10:23 AM

## 2023-09-08 RX ORDER — LORAZEPAM 0.5 MG/1
TABLET ORAL
Qty: 120 TABLET | Refills: 5 | OUTPATIENT
Start: 2023-09-08

## 2023-09-08 NOTE — TELEPHONE ENCOUNTER
[id number] ([facility] of [place] sent Rx request for the following:      Requested Prescriptions   Pending Prescriptions Disp Refills    LORazepam (ATIVAN) 0.5 MG tablet [Pharmacy Med Name: LORAZEPAM 0.5MG TABLET] 120 tablet 5     Sig: TAKE 1-2 TABLETS BY MOUTH DAILY AND 2 TABLETS NIGHTLY AS NEEDED FOR ANXIETY/SLEEP     Approved 9/6/23. Pharmacy notified. Shannan Cortez RN .............. 9/8/2023  2:04 PM

## 2023-09-25 ENCOUNTER — OFFICE VISIT (OUTPATIENT)
Dept: FAMILY MEDICINE | Facility: OTHER | Age: 48
End: 2023-09-25
Attending: FAMILY MEDICINE
Payer: COMMERCIAL

## 2023-09-25 VITALS
SYSTOLIC BLOOD PRESSURE: 128 MMHG | WEIGHT: 223.8 LBS | BODY MASS INDEX: 37.29 KG/M2 | HEIGHT: 65 IN | TEMPERATURE: 97.7 F | RESPIRATION RATE: 16 BRPM | OXYGEN SATURATION: 99 % | HEART RATE: 73 BPM | DIASTOLIC BLOOD PRESSURE: 86 MMHG

## 2023-09-25 DIAGNOSIS — F41.1 ANXIETY STATE: ICD-10-CM

## 2023-09-25 PROCEDURE — 99213 OFFICE O/P EST LOW 20 MIN: CPT | Performed by: FAMILY MEDICINE

## 2023-09-25 RX ORDER — FLUOXETINE 40 MG/1
40 CAPSULE ORAL DAILY
COMMUNITY
Start: 2023-09-25 | End: 2023-10-29

## 2023-09-25 ASSESSMENT — ANXIETY QUESTIONNAIRES
1. FEELING NERVOUS, ANXIOUS, OR ON EDGE: NEARLY EVERY DAY
5. BEING SO RESTLESS THAT IT IS HARD TO SIT STILL: SEVERAL DAYS
IF YOU CHECKED OFF ANY PROBLEMS ON THIS QUESTIONNAIRE, HOW DIFFICULT HAVE THESE PROBLEMS MADE IT FOR YOU TO DO YOUR WORK, TAKE CARE OF THINGS AT HOME, OR GET ALONG WITH OTHER PEOPLE: SOMEWHAT DIFFICULT
2. NOT BEING ABLE TO STOP OR CONTROL WORRYING: MORE THAN HALF THE DAYS
7. FEELING AFRAID AS IF SOMETHING AWFUL MIGHT HAPPEN: MORE THAN HALF THE DAYS
4. TROUBLE RELAXING: SEVERAL DAYS
GAD7 TOTAL SCORE: 12
6. BECOMING EASILY ANNOYED OR IRRITABLE: SEVERAL DAYS
3. WORRYING TOO MUCH ABOUT DIFFERENT THINGS: MORE THAN HALF THE DAYS
GAD7 TOTAL SCORE: 12

## 2023-09-25 ASSESSMENT — PAIN SCALES - GENERAL: PAINLEVEL: NO PAIN (0)

## 2023-09-25 NOTE — PROGRESS NOTES
Answers submitted by the patient for this visit:  DARYN-7 (Submitted on 9/25/2023)  DARYN 7 TOTAL SCORE: 12  Depression / Anxiety Questionnaire (Submitted on 9/25/2023)  Chief Complaint: Chronic problems general questions HPI Form  Depression/Anxiety: Anxiety  Anxiety only (Submitted on 9/25/2023)  Chief Complaint: Chronic problems general questions HPI Form  Anxiety since last: : worse  Other associated symotome: : No  Significant life event: : other  Anxious:: Yes  Current substance use:: No  General Questionnaire (Submitted on 9/25/2023)  Chief Complaint: Chronic problems general questions HPI Form  How many servings of fruits and vegetables do you eat daily?: 4 or more  On average, how many sweetened beverages do you drink each day (Examples: soda, juice, sweet tea, etc.  Do NOT count diet or artificially sweetened beverages)?: 2  How many minutes a day do you exercise enough to make your heart beat faster?: 9 or less  How many days a week do you exercise enough to make your heart beat faster?: 4  How many days per week do you miss taking your medication?: 1

## 2023-09-25 NOTE — PROGRESS NOTES
"  Assessment & Plan     Anxiety state  Continue Wellbutrin 3 mg daily.  Will increase Prozac to 40 mg daily.  She will follow-up in 6 to 8 weeks for reassessment.  - FLUoxetine (PROZAC) 40 MG capsule; Take 1 capsule (40 mg) by mouth daily       BMI:   Estimated body mass index is 37.24 kg/m  as calculated from the following:    Height as of this encounter: 1.651 m (5' 5\").    Weight as of this encounter: 101.5 kg (223 lb 12.8 oz).   Weight management plan: Discussed healthy diet and exercise guidelines    No follow-ups on file.    Lanre Shah MD  [facility]    Subjective   Aleks is a 48 year old, presenting for the following health issues:    She has a history of anxiety.  At her last visit in May we increased Prozac to 20 mg daily to Wellbutrin 300 mg daily.    Since I last saw her she also quit smoking.    Recheck Medication      9/25/2023     3:10 PM   Additional Questions   Roomed by AICHA Akins   Accompanied by Self       History of Present Illness       Mental Health Follow-up:  Patient presents to follow-up on Anxiety.    Patient's anxiety since last visit has been:  Worse  The patient is not having other symptoms associated with anxiety.  Any significant life events: other  Patient is feeling anxious or having panic attacks.  Patient has no concerns about alcohol or drug use.    She eats 4 or more servings of fruits and vegetables daily.She consumes 2 sweetened beverage(s) daily.She exercises with enough effort to increase her heart rate 9 or less minutes per day.  She exercises with enough effort to increase her heart rate 4 days per week. She is missing 1 dose(s) of medications per week.        Review of Systems         Objective    /86   Pulse 73   Temp 97.7  F (36.5  C) (Tympanic)   Resp 16   Ht 1.651 m (5' 5\")   Wt 101.5 kg (223 lb 12.8 oz)   LMP 09/21/2023 (Exact Date)   SpO2 99%   Breastfeeding No   BMI 37.24 kg/m    Body mass index is 37.24 kg/m .  Physical " Exam   GENERAL: healthy, alert and no distress  PSYCH: mentation appears normal, affect normal/bright                      Answers submitted by the patient for this visit:  DARYN-7 (Submitted on 9/25/2023)  DARYN 7 TOTAL SCORE: 12  Depression / Anxiety Questionnaire (Submitted on 9/25/2023)  Chief Complaint: Chronic problems general questions HPI Form  Depression/Anxiety: Anxiety  Anxiety only (Submitted on 9/25/2023)  Chief Complaint: Chronic problems general questions HPI Form  Anxiety since last: : worse  Other associated symotome: : No  Significant life event: : other  Anxious:: Yes  Current substance use:: No  General Questionnaire (Submitted on 9/25/2023)  Chief Complaint: Chronic problems general questions HPI Form  How many servings of fruits and vegetables do you eat daily?: 4 or more  On average, how many sweetened beverages do you drink each day (Examples: soda, juice, sweet tea, etc.  Do NOT count diet or artificially sweetened beverages)?: 2  How many minutes a day do you exercise enough to make your heart beat faster?: 9 or less  How many days a week do you exercise enough to make your heart beat faster?: 4  How many days per week do you miss taking your medication?: 1

## 2023-09-25 NOTE — NURSING NOTE
"Chief Complaint   Patient presents with    Recheck Medication     Patient is here for recheck on medications. She quit smoking after her last appointment     Initial /86   Pulse 73   Temp 97.7  F (36.5  C) (Tympanic)   Resp 16   Ht 1.651 m (5' 5\")   Wt 101.5 kg (223 lb 12.8 oz)   LMP 09/21/2023 (Exact Date)   SpO2 99%   Breastfeeding No   BMI 37.24 kg/m   Estimated body mass index is 37.24 kg/m  as calculated from the following:    Height as of this encounter: 1.651 m (5' 5\").    Weight as of this encounter: 101.5 kg (223 lb 12.8 oz).  Medication Reconciliation: complete    Mable Shoemaker CMA      FOOD SECURITY SCREENING QUESTIONS:    The next two questions are to help us understand your food security.  If you are feeling you need any assistance in this area, we have resources available to support you today.    Hunger Vital Signs:  Within the past 12 months we worried whether our food would run out before we got money to buy more. Never  Within the past 12 months the food we bought just didn't last and we didn't have money to get more. Never  Mable Shoemaker CMA,LPN on 9/25/2023 at 3:16 PM    "

## 2023-10-29 ENCOUNTER — MYC REFILL (OUTPATIENT)
Dept: FAMILY MEDICINE | Facility: OTHER | Age: 48
End: 2023-10-29
Payer: COMMERCIAL

## 2023-10-29 DIAGNOSIS — F41.1 ANXIETY STATE: ICD-10-CM

## 2023-11-02 DIAGNOSIS — F41.1 ANXIETY STATE: ICD-10-CM

## 2023-11-02 NOTE — TELEPHONE ENCOUNTER
"[id number] sent Rx request for the following:      Requested Prescriptions   Pending Prescriptions Disp Refills    FLUoxetine (PROZAC) 40 MG capsule       Sig: Take 1 capsule (40 mg) by mouth daily       SSRIs Protocol Passed - 10/29/2023 11:50 AM        Passed - Recent (12 mo) or future (30 days) visit within the authorizing provider's specialty     Patient has had an office visit with the authorizing provider or a provider within the authorizing providers department within the previous 12 mos or has a future within next 30 days. See \"Patient Info\" tab in inbasket, or \"Choose Columns\" in Meds & Orders section of the refill encounter.              Passed - Medication is active on med list        Passed - Patient is age 18 or older        Passed - No active pregnancy on record        Passed - No positive pregnancy test in last 12 months             Last Prescription Date:   9/25/2023       HISTORICAL  Last Office Visit:              9/25/2023 Pablo   Future Office visit:           11/13/2023 Pablo    Unable to complete prescription refill per RN Medication Refill Policy. Patient does not have enough to make it until next appointment. Will route to covering provider for review.    Jazzy Abad RN on 11/2/2023 at 4:10 PM        "

## 2023-11-03 RX ORDER — FLUOXETINE 40 MG/1
40 CAPSULE ORAL DAILY
Qty: 30 CAPSULE | Refills: 0 | Status: SHIPPED | OUTPATIENT
Start: 2023-11-03 | End: 2023-11-13

## 2023-11-09 RX ORDER — FLUOXETINE 40 MG/1
40 CAPSULE ORAL DAILY
Qty: 90 CAPSULE | Refills: 3 | Status: SHIPPED | OUTPATIENT
Start: 2023-11-09

## 2023-11-09 NOTE — TELEPHONE ENCOUNTER
Requested Prescriptions   Pending Prescriptions Disp Refills    FLUoxetine (PROZAC) 20 MG capsule [Pharmacy Med Name: FLUOXETINE 20MG CAPSULE] 90 capsule 3     Sig: TAKE 1 CAPSULE (20 MG) BY MOUTH EVERY DAY          Last Written Prescription Date:  11/3/23  Last Fill Quantity: 30,   # refills: 09/25/23  Last Office Visit: 9/25/23  Future Office visit:    Next 5 appointments (look out 90 days)      Nov 13, 2023  3:00 PM  SHORT with Lanre Shah MD  [facility] ([facility] ) [address]  [place] [address]  [phone]             Routing refill request to provider for review/approval because:  Drug not on the [facility], [facility] or [facility] refill protocol or controlled substance   Last office note states- Will increase Prozac to 40 mg daily.  She will follow-up in 6 to 8 weeks for reassessment.  Will forward to provider for consideration. Unable to complete prescription refill per RNMedication Refill Policy.................... Disha Alvarado RN ....................  11/9/2023   6:06 AM

## 2023-11-13 ENCOUNTER — OFFICE VISIT (OUTPATIENT)
Dept: FAMILY MEDICINE | Facility: OTHER | Age: 48
End: 2023-11-13
Attending: FAMILY MEDICINE
Payer: COMMERCIAL

## 2023-11-13 VITALS
BODY MASS INDEX: 37.41 KG/M2 | SYSTOLIC BLOOD PRESSURE: 128 MMHG | RESPIRATION RATE: 16 BRPM | HEART RATE: 74 BPM | DIASTOLIC BLOOD PRESSURE: 80 MMHG | WEIGHT: 224.8 LBS | TEMPERATURE: 97.7 F | OXYGEN SATURATION: 99 %

## 2023-11-13 DIAGNOSIS — F41.1 ANXIETY STATE: ICD-10-CM

## 2023-11-13 PROCEDURE — 99213 OFFICE O/P EST LOW 20 MIN: CPT | Performed by: FAMILY MEDICINE

## 2023-11-13 RX ORDER — BUPROPION HYDROCHLORIDE 300 MG/1
300 TABLET ORAL EVERY MORNING
Qty: 90 TABLET | Refills: 4 | Status: SHIPPED | OUTPATIENT
Start: 2023-11-13

## 2023-11-13 ASSESSMENT — ANXIETY QUESTIONNAIRES
4. TROUBLE RELAXING: SEVERAL DAYS
7. FEELING AFRAID AS IF SOMETHING AWFUL MIGHT HAPPEN: SEVERAL DAYS
2. NOT BEING ABLE TO STOP OR CONTROL WORRYING: SEVERAL DAYS
6. BECOMING EASILY ANNOYED OR IRRITABLE: SEVERAL DAYS
IF YOU CHECKED OFF ANY PROBLEMS ON THIS QUESTIONNAIRE, HOW DIFFICULT HAVE THESE PROBLEMS MADE IT FOR YOU TO DO YOUR WORK, TAKE CARE OF THINGS AT HOME, OR GET ALONG WITH OTHER PEOPLE: SOMEWHAT DIFFICULT
5. BEING SO RESTLESS THAT IT IS HARD TO SIT STILL: SEVERAL DAYS
1. FEELING NERVOUS, ANXIOUS, OR ON EDGE: SEVERAL DAYS
3. WORRYING TOO MUCH ABOUT DIFFERENT THINGS: SEVERAL DAYS
GAD7 TOTAL SCORE: 7
GAD7 TOTAL SCORE: 7

## 2023-11-13 NOTE — PROGRESS NOTES
SUBJECTIVE:  Dina Barraza is a 48 year old female here for follow-up.  She has a history of anxiety.  At her last visit we increase Prozac to 40 mg daily.  She continues on bupropion 300 mg daily.  She reports that her symptoms have improved significantly.  She continues have anxiety however they are much improved and tolerable at this time.  She is having no side effects.    Allergies:  Allergies   Allergen Reactions    Ranitidine Hives    Oxycodone Rash    Sulfa Antibiotics Rash    Sulfamethoxazole-Trimethoprim Rash       ROS:    As above otherwise ROS is unremarkable.    OBJECTIVE:  /80   Pulse 74   Temp 97.7  F (36.5  C) (Tympanic)   Resp 16   Wt 102 kg (224 lb 12.8 oz)   LMP 11/08/2023 (Approximate)   SpO2 99%   BMI 37.41 kg/m      EXAM:  General Appearance: Pleasant, alert, appropriate appearance for age. No acute distress  Head: Normal. Normocephalic, atraumatic.  Psychiatric Exam: Alert and oriented, appropriate affect.  No psychomotor agitation retardation.  No thoughts of harming herself or others.  No hallucinations.    ASSESSEMENT AND PLAN:    1. Anxiety state      We will continue current regimen for the next year.  At that point time she can decide to is try to titrate off or continue long-term.  Follow-up sooner if she has any return of symptoms.    Tobias Shah MD  Family Medicine

## 2023-11-13 NOTE — NURSING NOTE
Pt presents to clinic today for follow up medications.      Medication Reconciliation: complete  Ellen Conklin LPN

## 2024-01-28 DIAGNOSIS — F41.0 PANIC ATTACK: ICD-10-CM

## 2024-01-29 RX ORDER — LORAZEPAM 0.5 MG/1
TABLET ORAL
Qty: 120 TABLET | Refills: 5 | Status: SHIPPED | OUTPATIENT
Start: 2024-01-29 | End: 2024-07-22

## 2024-01-29 NOTE — TELEPHONE ENCOUNTER
[id number] of [place] sent Rx request for the following:      Requested Prescriptions   Pending Prescriptions Disp Refills    LORazepam (ATIVAN) 0.5 MG tablet [Pharmacy Med Name: LORAZEPAM 0.5MG TABLET] 120 tablet 5     Sig: TAKE 1-2 TABLETS BY MOUTH DAILY AND 2 TABLETS NIGHTLY AS NEEDED FOR ANXIETY/SLEEP       There is no refill protocol information for this order        Last Prescription Date:   9/6/23  Last Fill Qty/Refills:         120, R-5    Last Office Visit:              11/13/23   Future Office visit:           None    Unable to complete prescription refill per RN Medication Refill Policy.     Shannan Cortez RN .............. 1/29/2024  8:58 AM

## 2024-07-16 DIAGNOSIS — F41.0 PANIC ATTACK: ICD-10-CM

## 2024-07-19 NOTE — TELEPHONE ENCOUNTER
[id number] ([facility] of [place] sent Rx request for the following:      Requested Prescriptions   Pending Prescriptions Disp Refills    LORazepam (ATIVAN) 0.5 MG tablet [Pharmacy Med Name: LORAZEPAM 0.5MG TABLET] 120 tablet 5     Sig: TAKE 1-2 TABLETS BY MOUTH DAILY AND 2 TABLETS NIGHTLYAS NEEDED FOR ANXIETY/SLEEP       There is no refill protocol information for this order        Last Prescription Date:   1/29/24  Last Fill Qty/Refills:         120, R-5    Last Office Visit:                11/13/23 2/27/23 (Panic attack discussed)    Future Office visit:           None    Pt due for annual exam. Routing to provider for refill consideration. Routing to Unit scheduling pool, to assist Pt in scheduling appointment.     Unable to complete prescription refill per RN Medication Refill Policy.     Shannan Cortez RN .............. 7/19/2024  4:16 PM

## 2024-07-22 RX ORDER — LORAZEPAM 0.5 MG/1
TABLET ORAL
Qty: 120 TABLET | Refills: 5 | Status: SHIPPED | OUTPATIENT
Start: 2024-07-22

## 2024-07-23 ENCOUNTER — MYC MEDICAL ADVICE (OUTPATIENT)
Dept: FAMILY MEDICINE | Facility: OTHER | Age: 49
End: 2024-07-23
Payer: COMMERCIAL

## 2024-07-26 ENCOUNTER — HOSPITAL ENCOUNTER (EMERGENCY)
Facility: OTHER | Age: 49
Discharge: HOME | End: 2024-07-26
Attending: PHYSICIAN ASSISTANT | Admitting: PHYSICIAN ASSISTANT
Payer: COMMERCIAL

## 2024-07-26 ENCOUNTER — APPOINTMENT (OUTPATIENT)
Dept: GENERAL RADIOLOGY | Facility: OTHER | Age: 49
End: 2024-07-26
Attending: PHYSICIAN ASSISTANT
Payer: COMMERCIAL

## 2024-07-26 VITALS
WEIGHT: 224 LBS | SYSTOLIC BLOOD PRESSURE: 175 MMHG | TEMPERATURE: 97.8 F | RESPIRATION RATE: 17 BRPM | HEART RATE: 76 BPM | OXYGEN SATURATION: 99 % | BODY MASS INDEX: 37.28 KG/M2 | DIASTOLIC BLOOD PRESSURE: 97 MMHG

## 2024-07-26 DIAGNOSIS — R07.9 CHEST PAIN: ICD-10-CM

## 2024-07-26 DIAGNOSIS — F43.0 ACUTE REACTION TO STRESS: ICD-10-CM

## 2024-07-26 LAB
ANION GAP SERPL CALCULATED.3IONS-SCNC: 11 MMOL/L (ref 7–15)
BASOPHILS # BLD AUTO: 0 10E3/UL (ref 0–0.2)
BASOPHILS NFR BLD AUTO: 0 %
BUN SERPL-MCNC: 10.5 MG/DL (ref 6–20)
CALCIUM SERPL-MCNC: 9.1 MG/DL (ref 8.8–10.4)
CHLORIDE SERPL-SCNC: 102 MMOL/L (ref 98–107)
CREAT SERPL-MCNC: 0.6 MG/DL (ref 0.51–0.95)
D DIMER PPP FEU-MCNC: <=0.27 UG/ML FEU (ref 0–0.5)
EGFRCR SERPLBLD CKD-EPI 2021: >90 ML/MIN/1.73M2
EOSINOPHIL # BLD AUTO: 0.1 10E3/UL (ref 0–0.7)
EOSINOPHIL NFR BLD AUTO: 1 %
ERYTHROCYTE [DISTWIDTH] IN BLOOD BY AUTOMATED COUNT: 13.2 % (ref 10–15)
GLUCOSE SERPL-MCNC: 89 MG/DL (ref 70–99)
HCO3 SERPL-SCNC: 24 MMOL/L (ref 22–29)
HCT VFR BLD AUTO: 42.2 % (ref 35–47)
HGB BLD-MCNC: 13.9 G/DL (ref 11.7–15.7)
HOLD SPECIMEN: NORMAL
IMM GRANULOCYTES # BLD: 0 10E3/UL
IMM GRANULOCYTES NFR BLD: 0 %
LIPASE SERPL-CCNC: 13 U/L (ref 13–60)
LYMPHOCYTES # BLD AUTO: 2.4 10E3/UL (ref 0.8–5.3)
LYMPHOCYTES NFR BLD AUTO: 25 %
MCH RBC QN AUTO: 29.1 PG (ref 26.5–33)
MCHC RBC AUTO-ENTMCNC: 32.9 G/DL (ref 31.5–36.5)
MCV RBC AUTO: 89 FL (ref 78–100)
MONOCYTES # BLD AUTO: 0.6 10E3/UL (ref 0–1.3)
MONOCYTES NFR BLD AUTO: 7 %
NEUTROPHILS # BLD AUTO: 6.1 10E3/UL (ref 1.6–8.3)
NEUTROPHILS NFR BLD AUTO: 66 %
NRBC # BLD AUTO: 0 10E3/UL
NRBC BLD AUTO-RTO: 0 /100
NT-PROBNP SERPL-MCNC: 333 PG/ML (ref 0–450)
PLATELET # BLD AUTO: 268 10E3/UL (ref 150–450)
POTASSIUM SERPL-SCNC: 3.5 MMOL/L (ref 3.4–5.3)
RBC # BLD AUTO: 4.77 10E6/UL (ref 3.8–5.2)
SODIUM SERPL-SCNC: 137 MMOL/L (ref 135–145)
TROPONIN T SERPL HS-MCNC: <6 NG/L
WBC # BLD AUTO: 9.3 10E3/UL (ref 4–11)

## 2024-07-26 PROCEDURE — 36415 COLL VENOUS BLD VENIPUNCTURE: CPT | Performed by: PHYSICIAN ASSISTANT

## 2024-07-26 PROCEDURE — 99285 EMERGENCY DEPT VISIT HI MDM: CPT | Mod: 25 | Performed by: PHYSICIAN ASSISTANT

## 2024-07-26 PROCEDURE — 99284 EMERGENCY DEPT VISIT MOD MDM: CPT | Performed by: PHYSICIAN ASSISTANT

## 2024-07-26 PROCEDURE — 85025 COMPLETE CBC W/AUTO DIFF WBC: CPT | Performed by: PHYSICIAN ASSISTANT

## 2024-07-26 PROCEDURE — 80048 BASIC METABOLIC PNL TOTAL CA: CPT | Performed by: PHYSICIAN ASSISTANT

## 2024-07-26 PROCEDURE — 85379 FIBRIN DEGRADATION QUANT: CPT | Performed by: PHYSICIAN ASSISTANT

## 2024-07-26 PROCEDURE — 71045 X-RAY EXAM CHEST 1 VIEW: CPT | Mod: TC

## 2024-07-26 PROCEDURE — 93005 ELECTROCARDIOGRAM TRACING: CPT | Performed by: PHYSICIAN ASSISTANT

## 2024-07-26 PROCEDURE — 83690 ASSAY OF LIPASE: CPT | Performed by: PHYSICIAN ASSISTANT

## 2024-07-26 PROCEDURE — 93010 ELECTROCARDIOGRAM REPORT: CPT | Performed by: INTERNAL MEDICINE

## 2024-07-26 PROCEDURE — 84484 ASSAY OF TROPONIN QUANT: CPT | Performed by: PHYSICIAN ASSISTANT

## 2024-07-26 PROCEDURE — 83880 ASSAY OF NATRIURETIC PEPTIDE: CPT | Performed by: PHYSICIAN ASSISTANT

## 2024-07-26 ASSESSMENT — ACTIVITIES OF DAILY LIVING (ADL)
ADLS_ACUITY_SCORE: 35

## 2024-07-26 ASSESSMENT — COLUMBIA-SUICIDE SEVERITY RATING SCALE - C-SSRS
1. IN THE PAST MONTH, HAVE YOU WISHED YOU WERE DEAD OR WISHED YOU COULD GO TO SLEEP AND NOT WAKE UP?: NO
6. HAVE YOU EVER DONE ANYTHING, STARTED TO DO ANYTHING, OR PREPARED TO DO ANYTHING TO END YOUR LIFE?: NO
2. HAVE YOU ACTUALLY HAD ANY THOUGHTS OF KILLING YOURSELF IN THE PAST MONTH?: NO

## 2024-07-27 ASSESSMENT — ENCOUNTER SYMPTOMS
ABDOMINAL PAIN: 0
NAUSEA: 0
NERVOUS/ANXIOUS: 1
SHORTNESS OF BREATH: 0
VOMITING: 0
FEVER: 0
CHILLS: 0
COUGH: 0
DIAPHORESIS: 0

## 2024-07-27 NOTE — DISCHARGE INSTRUCTIONS
Get plenty of fluids and rest.  As discussed, all of your studies appeared excellent for us today.  We did discuss that you are at low to moderate risk of having major adverse cardiac event in the near future we discussed further observation and obtaining further studies.  We also discussed that you are under a lot of stress which can cause some of the symptoms you are having.  You did decide to continue with close monitoring as an outpatient and return if you have any worsening or concerning symptoms including intractable pain, increased nausea vomiting, sweating etc.  You are on a good nightly dose of your Ativan which is 1 mg per night before sleep.  You can take 1 mg every 8 hours.  Return to the ED for any worsening or concerning symptoms, follow-up with PCP as needed.

## 2024-07-27 NOTE — ED PROVIDER NOTES
History     Chief Complaint   Patient presents with    Nausea    Chest Pain    Anxiety     HPI  Dina Barraza is a 49 year old female who presents to the ED for evaluation of nausea, chest pain, anxiety. Pt is here today with her fiance for chest pain, nausea and anxiety. Pt states that the chest pain is mid sternal. Pushing on her chest makes the pain worse. Started a few hours ago. Pain radiates to her left shoulder blade. Pt does have hx of anxiety. She did take her ativan between 4-5 pm. At this time, chest pain has resolved. Pt is under a lot of stress. Her son was in a car accident in the last week. Pt is very tearful in triage.     Allergies:  Allergies   Allergen Reactions    Ranitidine Hives    Oxycodone Rash    Sulfa Antibiotics Rash    Sulfamethoxazole-Trimethoprim Rash       Problem List:    Patient Active Problem List    Diagnosis Date Noted    Essential hypertension 07/01/2019     Priority: Medium    Morbid obesity (H) 01/29/2019     Priority: Medium    Anxiety state 01/24/2018     Priority: Medium    Gastroesophageal reflux disease 01/24/2018     Priority: Medium    Obesity 01/24/2018     Priority: Medium    Mild intermittent asthma without complication 02/21/2013     Priority: Medium     Overview:   Illness-induced      Asthma 02/21/2013     Priority: Medium     Formatting of this note might be different from the original.  Illness-induced          Past Medical History:    Past Medical History:   Diagnosis Date    Major depressive disorder, single episode     Mild intermittent asthma, uncomplicated        Past Surgical History:    Past Surgical History:   Procedure Laterality Date    COLONOSCOPY N/A 04/06/2023    Follow up 5yrs 1 Sessile Serrated Adenoma    LAPAROSCOPIC CHOLECYSTECTOMY      08/09/02    LAPAROSCOPIC TUBAL LIGATION      2005       Family History:    Family History   Problem Relation Age of Onset    Diabetes Mother         Diabetes    Hypertension Mother         Hypertension     Diabetes Father         Diabetes,type 2    Heart Disease Father         Heart Disease, MI, CVA and CABG    Heart Disease Paternal Grandfather         Heart Disease,MI age 60,     Hypertension Maternal Grandmother         Hypertension    Diabetes Maternal Aunt         Diabetes,type 2    Diabetes Maternal Uncle         Diabetes,type 2    Family History Negative Son         Good Health    Family History Negative Son         Good Health,Asthma    Family History Negative Daughter         Good Health,Asthma       Social History:  Marital Status:   [4]  Social History     Tobacco Use    Smoking status: Former     Current packs/day: 0.00     Average packs/day: 1 pack/day for 15.0 years (15.0 ttl pk-yrs)     Types: Cigarettes     Start date: 2008     Quit date: 2023     Years since quittin.0    Smokeless tobacco: Never   Vaping Use    Vaping status: Never Used   Substance Use Topics    Alcohol use: Not Currently     Comment: rare    Drug use: Never        Medications:    LORazepam (ATIVAN) 0.5 MG tablet  albuterol (PROAIR HFA/PROVENTIL HFA/VENTOLIN HFA) 108 (90 Base) MCG/ACT inhaler  buPROPion (WELLBUTRIN XL) 300 MG 24 hr tablet  FLUoxetine (PROZAC) 40 MG capsule  QVAR REDIHALER 40 MCG/ACT inhaler          Review of Systems   Constitutional:  Negative for chills, diaphoresis and fever.   HENT:  Negative for congestion.    Eyes:  Negative for visual disturbance.   Respiratory:  Negative for cough and shortness of breath.    Cardiovascular:  Positive for chest pain.   Gastrointestinal:  Negative for abdominal pain, nausea and vomiting.   Neurological:  Negative for syncope.   Psychiatric/Behavioral:  Negative for suicidal ideas. The patient is nervous/anxious.        Physical Exam   BP: (!) 169/79  Pulse: 82  Temp: 97.8  F (36.6  C)  Resp: 22  Weight: 101.6 kg (224 lb)  SpO2: 99 %      Physical Exam  Constitutional:       General: She is not in acute distress.     Appearance: She is well-developed.  She is not diaphoretic.   HENT:      Head: Normocephalic and atraumatic.   Eyes:      General: No scleral icterus.     Conjunctiva/sclera: Conjunctivae normal.   Neck:      Vascular: No carotid bruit.   Cardiovascular:      Rate and Rhythm: Normal rate and regular rhythm.   Pulmonary:      Effort: Pulmonary effort is normal.      Breath sounds: Normal breath sounds.   Abdominal:      Palpations: Abdomen is soft.      Tenderness: There is no abdominal tenderness.   Musculoskeletal:         General: No deformity.      Cervical back: Neck supple.      Right lower leg: No edema.      Left lower leg: No edema.   Skin:     General: Skin is warm and dry.      Coloration: Skin is not jaundiced.      Findings: No rash.   Neurological:      Mental Status: She is alert. Mental status is at baseline.   Psychiatric:      Comments: Anxious/tearful         ED Course        Procedures         EKG read at 2010. HR 75, NSR, T wave inversion lead 3, otherwise no ST changes.     Critical Care time:  none               Results for orders placed or performed during the hospital encounter of 07/26/24 (from the past 24 hour(s))   CBC with platelets differential    Narrative    The following orders were created for panel order CBC with platelets differential.  Procedure                               Abnormality         Status                     ---------                               -----------         ------                     CBC with platelets and d...[[id number]]                      Final result                 Please view results for these tests on the individual orders.   Basic metabolic panel   Result Value Ref Range    Sodium 137 135 - 145 mmol/L    Potassium 3.5 3.4 - 5.3 mmol/L    Chloride 102 98 - 107 mmol/L    Carbon Dioxide (CO2) 24 22 - 29 mmol/L    Anion Gap 11 7 - 15 mmol/L    Urea Nitrogen 10.5 6.0 - 20.0 mg/dL    Creatinine 0.60 0.51 - 0.95 mg/dL    GFR Estimate >90 >60 mL/min/1.73m2    Calcium 9.1 8.8 - 10.4 mg/dL     Glucose 89 70 - 99 mg/dL   Lipase   Result Value Ref Range    Lipase 13 13 - 60 U/L   Nt probnp inpatient (BNP)   Result Value Ref Range    N terminal Pro BNP Inpatient 333 0 - 450 pg/mL   D dimer quantitative   Result Value Ref Range    D-Dimer Quantitative <=0.27 0.00 - 0.50 ug/mL FEU    Narrative    This D-dimer assay is intended for use in conjunction with a clinical pretest probability assessment model to exclude pulmonary embolism (PE) and deep venous thrombosis (DVT) in outpatients suspected of PE or DVT. The cut-off value is 0.50 ug/mL FEU.   Extra Tube    Narrative    The following orders were created for panel order Extra Tube.  Procedure                               Abnormality         Status                     ---------                               -----------         ------                     Extra Red Top Tube[[id number]]                               Final result                 Please view results for these tests on the individual orders.   Extra Red Top Tube   Result Value Ref Range    Hold Specimen JIC    CBC with platelets and differential   Result Value Ref Range    WBC Count 9.3 4.0 - 11.0 10e3/uL    RBC Count 4.77 3.80 - 5.20 10e6/uL    Hemoglobin 13.9 11.7 - 15.7 g/dL    Hematocrit 42.2 35.0 - 47.0 %    MCV 89 78 - 100 fL    MCH 29.1 26.5 - 33.0 pg    MCHC 32.9 31.5 - 36.5 g/dL    RDW 13.2 10.0 - 15.0 %    Platelet Count 268 150 - 450 10e3/uL    % Neutrophils 66 %    % Lymphocytes 25 %    % Monocytes 7 %    % Eosinophils 1 %    % Basophils 0 %    % Immature Granulocytes 0 %    NRBCs per 100 WBC 0 <1 /100    Absolute Neutrophils 6.1 1.6 - 8.3 10e3/uL    Absolute Lymphocytes 2.4 0.8 - 5.3 10e3/uL    Absolute Monocytes 0.6 0.0 - 1.3 10e3/uL    Absolute Eosinophils 0.1 0.0 - 0.7 10e3/uL    Absolute Basophils 0.0 0.0 - 0.2 10e3/uL    Absolute Immature Granulocytes 0.0 <=0.4 10e3/uL    Absolute NRBCs 0.0 10e3/uL   Troponin T, High Sensitivity   Result Value Ref Range    Troponin T, High  Sensitivity <6 <=14 ng/L   XR Chest Port 1 View    Narrative    PROCEDURE INFORMATION:   Exam: XR Chest   Exam date and time: 7/26/2024 9:32 PM   Age: 49 years old   Clinical indication: Pain; Other: Unspecifi; Additional info: Chest pain     TECHNIQUE:   Imaging protocol: Radiologic exam of the chest.   Views: 1 view.     COMPARISON:   CR XR CHEST 2 VW 6/25/2019 9:10 PM     FINDINGS:   Lungs: Unremarkable. No consolidation.   Pleural spaces: Unremarkable. No pleural effusion. No pneumothorax.   Heart/Mediastinum: Unremarkable. No cardiomegaly.   Bones/joints: Unremarkable.       Impression    IMPRESSION:   No radiographic evidence of active cardiopulmonary disease.    Current examination is unchanged from prior examination.    THIS DOCUMENT HAS BEEN ELECTRONICALLY SIGNED BY KVNG TOMLIN MD       Medications - No data to display    Assessments & Plan (with Medical Decision Making)   Nontoxic but anxious and tearful. Heart, lung, bowel sounds normal, abd soft, nontender to palpation, nondistended. VSS, afebrile. Good, equal, peripheral pulses in all extremities.     Grossly normal lab work including normal bnp, neg troponin and ddimer.     CXR: No radiographic evidence of active cardiopulmonary disease.     We discussed that she is at moderate risk of MACE in the near future. I offered her serial troponins, further imaging, and observation. She declined at this time and feels that her sx's this evening are due to the extreme stressors in her life.     From discharge:  As discussed, all of your studies appeared excellent for us today.  We did discuss that you are at low to moderate risk of having major adverse cardiac event in the near future we discussed further observation and obtaining further studies.  We also discussed that you are under a lot of stress which can cause some of the symptoms you are having.  You did decide to continue with close monitoring as an outpatient and return if you have any  worsening or concerning symptoms including intractable pain, increased nausea vomiting, sweating etc.  You are on a good nightly dose of your Ativan which is 1 mg per night before sleep.  You can take 1 mg every 8 hours.  Return to the ED for any worsening or concerning symptoms, follow-up with PCP as needed.    Strict return precautions are given to the pt, they will return if symptoms are worsening or concerning. The pt understands and agrees with the plan and they are discharged.     Noam Gutierrez PA-C          I have reviewed the nursing notes.    I have reviewed the findings, diagnosis, plan and need for follow up with the patient.        Discharge Medication List as of 7/26/2024 10:38 PM          Final diagnoses:   Chest pain   Acute reaction to stress       7/26/2024   [facility] AND Noam Hawley PA  07/27/24 0035

## 2024-07-27 NOTE — ED TRIAGE NOTES
Pt is here today with her fiance for chest pain, nausea and anxiety.  Pt states that the chest pain is mid sternal.  Pushing on her chest makes the pain worse.    Started a few hours ago.  Pain radiates to her left shoulder blade.   Pt does have hx of anxiety.   Sh did take her ativan between 4-5 pm.   At this time, chest pain has resolved.   Pt is under a lot of stress.  Her son was in a car accident in the last week.   Pt is very tearful in triage.     BP (!) 169/79   Pulse 82   Temp 97.8  F (36.6  C) (Temporal)   Resp 22   Wt 101.6 kg (224 lb)   SpO2 99%   BMI 37.28 kg/m    Elin Marti RN on 7/26/2024 at 8:03 PM       Triage Assessment (Adult)       Row Name 07/26/24 2000          Triage Assessment    Airway WDL WDL        Respiratory WDL    Respiratory WDL WDL        Skin Circulation/Temperature WDL    Skin Circulation/Temperature WDL WDL        Cardiac WDL    Cardiac WDL X;chest pain        Chest Pain Assessment    Chest Pain Location midsternal     Chest Pain Radiation shoulder     Character sharp     Precipitating Factors other (see comments)  disagreement with son     Associated Signs/Symptoms anxiety        Peripheral/Neurovascular WDL    Peripheral Neurovascular WDL WDL        Cognitive/Neuro/Behavioral WDL    Cognitive/Neuro/Behavioral WDL WDL

## 2024-07-29 LAB
ATRIAL RATE - MUSE: 75 BPM
DIASTOLIC BLOOD PRESSURE - MUSE: NORMAL MMHG
INTERPRETATION ECG - MUSE: NORMAL
P AXIS - MUSE: 17 DEGREES
PR INTERVAL - MUSE: 168 MS
QRS DURATION - MUSE: 88 MS
QT - MUSE: 410 MS
QTC - MUSE: 457 MS
R AXIS - MUSE: 14 DEGREES
SYSTOLIC BLOOD PRESSURE - MUSE: NORMAL MMHG
T AXIS - MUSE: 11 DEGREES
VENTRICULAR RATE- MUSE: 75 BPM

## 2024-12-02 DIAGNOSIS — F41.1 ANXIETY STATE: ICD-10-CM

## 2024-12-04 RX ORDER — FLUOXETINE 40 MG/1
CAPSULE ORAL
Qty: 90 CAPSULE | Refills: 0 | Status: SHIPPED | OUTPATIENT
Start: 2024-12-04

## 2024-12-04 RX ORDER — BUPROPION HYDROCHLORIDE 300 MG/1
300 TABLET ORAL EVERY MORNING
Qty: 90 TABLET | Refills: 0 | Status: SHIPPED | OUTPATIENT
Start: 2024-12-04

## 2024-12-04 NOTE — TELEPHONE ENCOUNTER
[id number] ([employer]) of [place] sent Rx request for the following:      Requested Prescriptions   Pending Prescriptions Disp Refills    buPROPion (WELLBUTRIN XL) 300 MG 24 hr tablet [Pharmacy Med Name: BUPROPION 300MG ER (XL) TABLET] 90 tablet 4     Sig: TAKE 1 TABLET (300 MG) BY MOUTH EVERY MORNING       Rx Protocol Bupropion Failed - 12/4/2024  1:54 PM        Failed - Recent (12 mo) or future (90 days) visit within the authorizing provider's specialty     The patient must have completed an in-person or virtual visit within the past 12 months or has a future visit scheduled within the next 90 days with the authorizing provider s specialty.  Urgent care and e-visits do not qualify as an office visit for this protocol.       Last Prescription Date:   11/13/23  Last Fill Qty/Refills:         90, R-4             FLUoxetine (PROZAC) 40 MG capsule [Pharmacy Med Name: FLUOXETINE 40MG CAPSULE] 90 capsule 3     Sig: TAKE 1 CAPSULE (40 MG) BY MOUTH EVERY DAY       SSRIs Protocol Failed - 12/4/2024  1:54 PM        Failed - DARYN-7 score of less than 5 in past 6 months.     Please review last DARYN-7 score.           Failed - Recent (12 mo) or future (90 days) visit within the authorizing provider's specialty     The patient must have completed an in-person or virtual visit within the past 12 months or has a future visit scheduled within the next 90 days with the authorizing provider s specialty.  Urgent care and e-visits do not qualify as an office visit for this protocol.            Last Prescription Date:   11/9/23  Last Fill Qty/Refills:         90, R-3    Last Office Visit:              11/13/23   Future Office visit:           none    Will route to unit schedulers, patient is due for annual wellness exam.    Routing refill request to provider for review/approval because:  Patient needs to be seen because it has been more than 1 year since last office visit.    Joanne Mooney RN on 12/4/2024 at  1:56 PM

## 2024-12-29 DIAGNOSIS — F41.0 PANIC ATTACK: ICD-10-CM

## 2025-01-02 RX ORDER — LORAZEPAM 0.5 MG/1
TABLET ORAL
Qty: 120 TABLET | Refills: 5 | Status: SHIPPED | OUTPATIENT
Start: 2025-01-02

## 2025-03-26 ENCOUNTER — PATIENT OUTREACH (OUTPATIENT)
Dept: CARE COORDINATION | Facility: CLINIC | Age: 50
End: 2025-03-26
Payer: COMMERCIAL

## 2025-03-27 DIAGNOSIS — F41.1 ANXIETY STATE: ICD-10-CM

## 2025-03-27 RX ORDER — FLUOXETINE HYDROCHLORIDE 40 MG/1
40 CAPSULE ORAL DAILY
Qty: 90 CAPSULE | Refills: 3 | Status: SHIPPED | OUTPATIENT
Start: 2025-03-27

## 2025-03-27 NOTE — TELEPHONE ENCOUNTER
[id number] ([employer]) of [place] sent Rx request for the following:      Requested Prescriptions   Pending Prescriptions Disp Refills    FLUoxetine (PROZAC) 40 MG capsule 90 capsule 0     Sig: Take 1 capsule (40 mg) by mouth daily.       SSRIs Protocol Failed - 3/27/2025  9:49 AM        Failed - Medication is active on med list and the sig matches. RN to manually verify dose and sig if red X/fail.     If the protocol passes (green check), you do not need to verify med dose and sig.    A prescription matches if they are the same clinical intention.    For Example: once daily and every morning are the same.    The protocol can not identify upper and lower case letters as matching and will fail.     For Example: Take 1 tablet (50 mg) by mouth daily     TAKE 1 TABLET (50 MG) BY MOUTH DAILY    For all fails (red x), verify dose and sig.    If the refill does match what is on file, the RN can still proceed to approve the refill request.       If they do not match, route to the appropriate provider.             Failed - DARYN-7 score of less than 5 in past 6 months.     Please review last DARYN-7 score.       7/24/2023     3:03 PM 9/25/2023     3:05 PM 11/13/2023     3:14 PM   DARYN-7 SCORE   Total Score 10 (moderate anxiety) 12 (moderate anxiety) 7 (mild anxiety)   Total Score 10 12 7             Failed - Recent (12 mo) or future (90 days) visit within the authorizing provider's specialty     The patient must have completed an in-person or virtual visit within the past 12 months or has a future visit scheduled within the next 90 days with the authorizing provider s specialty.  Urgent care and e-visits do not qualify as an office visit for this protocol.         Last Prescription Date:   12/4/24  Last Fill Qty/Refills:         90, R-0    Last Office Visit:              11/13/23   Future Office visit:           none    Routing refill request to provider for review/approval because:  Patient needs to be  seen because it has been more than 1 year since last office visit.    Joanne Mooney RN on 3/27/2025 at 9:50 AM

## 2025-05-17 ENCOUNTER — HEALTH MAINTENANCE LETTER (OUTPATIENT)
Age: 50
End: 2025-05-17

## 2025-05-21 ENCOUNTER — HOSPITAL ENCOUNTER (OUTPATIENT)
Dept: GENERAL RADIOLOGY | Facility: OTHER | Age: 50
Discharge: HOME | End: 2025-05-21
Attending: STUDENT IN AN ORGANIZED HEALTH CARE EDUCATION/TRAINING PROGRAM
Payer: COMMERCIAL

## 2025-05-21 ENCOUNTER — OFFICE VISIT (OUTPATIENT)
Dept: FAMILY MEDICINE | Facility: OTHER | Age: 50
End: 2025-05-21
Attending: STUDENT IN AN ORGANIZED HEALTH CARE EDUCATION/TRAINING PROGRAM
Payer: COMMERCIAL

## 2025-05-21 VITALS
TEMPERATURE: 97.8 F | OXYGEN SATURATION: 98 % | WEIGHT: 256.5 LBS | HEART RATE: 91 BPM | BODY MASS INDEX: 42.68 KG/M2 | DIASTOLIC BLOOD PRESSURE: 80 MMHG | RESPIRATION RATE: 20 BRPM | SYSTOLIC BLOOD PRESSURE: 134 MMHG

## 2025-05-21 DIAGNOSIS — G89.29 CHRONIC PAIN OF RIGHT KNEE: Primary | ICD-10-CM

## 2025-05-21 DIAGNOSIS — Z12.31 ENCOUNTER FOR SCREENING MAMMOGRAM FOR BREAST CANCER: ICD-10-CM

## 2025-05-21 DIAGNOSIS — N95.1 PERIMENOPAUSE: ICD-10-CM

## 2025-05-21 DIAGNOSIS — M25.561 CHRONIC PAIN OF RIGHT KNEE: ICD-10-CM

## 2025-05-21 DIAGNOSIS — M25.561 CHRONIC PAIN OF RIGHT KNEE: Primary | ICD-10-CM

## 2025-05-21 DIAGNOSIS — G89.29 CHRONIC PAIN OF RIGHT KNEE: ICD-10-CM

## 2025-05-21 DIAGNOSIS — M22.2X1 PATELLOFEMORAL PAIN SYNDROME OF RIGHT KNEE: ICD-10-CM

## 2025-05-21 PROCEDURE — 96372 THER/PROPH/DIAG INJ SC/IM: CPT | Performed by: STUDENT IN AN ORGANIZED HEALTH CARE EDUCATION/TRAINING PROGRAM

## 2025-05-21 PROCEDURE — 250N000011 HC RX IP 250 OP 636: Mod: JZ | Performed by: STUDENT IN AN ORGANIZED HEALTH CARE EDUCATION/TRAINING PROGRAM

## 2025-05-21 PROCEDURE — 73562 X-RAY EXAM OF KNEE 3: CPT | Mod: 26 | Performed by: RADIOLOGY

## 2025-05-21 PROCEDURE — 73562 X-RAY EXAM OF KNEE 3: CPT | Mod: RT

## 2025-05-21 RX ORDER — CYCLOBENZAPRINE HCL 5 MG
5 TABLET ORAL
Qty: 30 TABLET | Refills: 1 | Status: SHIPPED | OUTPATIENT
Start: 2025-05-21

## 2025-05-21 RX ORDER — KETOROLAC TROMETHAMINE 30 MG/ML
30 INJECTION, SOLUTION INTRAMUSCULAR; INTRAVENOUS ONCE
Status: COMPLETED | OUTPATIENT
Start: 2025-05-21 | End: 2025-05-21

## 2025-05-21 RX ADMIN — KETOROLAC TROMETHAMINE 30 MG: 30 INJECTION, SOLUTION INTRAMUSCULAR at 15:33

## 2025-05-21 ASSESSMENT — ASTHMA QUESTIONNAIRES
QUESTION_4 LAST FOUR WEEKS HOW OFTEN HAVE YOU USED YOUR RESCUE INHALER OR NEBULIZER MEDICATION (SUCH AS ALBUTEROL): NOT AT ALL
QUESTION_1 LAST FOUR WEEKS HOW MUCH OF THE TIME DID YOUR ASTHMA KEEP YOU FROM GETTING AS MUCH DONE AT WORK, SCHOOL OR AT HOME: NONE OF THE TIME
QUESTION_3 LAST FOUR WEEKS HOW OFTEN DID YOUR ASTHMA SYMPTOMS (WHEEZING, COUGHING, SHORTNESS OF BREATH, CHEST TIGHTNESS OR PAIN) WAKE YOU UP AT NIGHT OR EARLIER THAN USUAL IN THE MORNING: NOT AT ALL
QUESTION_5 LAST FOUR WEEKS HOW WOULD YOU RATE YOUR ASTHMA CONTROL: COMPLETELY CONTROLLED
QUESTION_2 LAST FOUR WEEKS HOW OFTEN HAVE YOU HAD SHORTNESS OF BREATH: NOT AT ALL
ACT_TOTALSCORE: 25

## 2025-05-21 ASSESSMENT — PATIENT HEALTH QUESTIONNAIRE - PHQ9
SUM OF ALL RESPONSES TO PHQ QUESTIONS 1-9: 9
10. IF YOU CHECKED OFF ANY PROBLEMS, HOW DIFFICULT HAVE THESE PROBLEMS MADE IT FOR YOU TO DO YOUR WORK, TAKE CARE OF THINGS AT HOME, OR GET ALONG WITH OTHER PEOPLE: SOMEWHAT DIFFICULT
SUM OF ALL RESPONSES TO PHQ QUESTIONS 1-9: 9

## 2025-05-21 ASSESSMENT — ANXIETY QUESTIONNAIRES
4. TROUBLE RELAXING: MORE THAN HALF THE DAYS
GAD7 TOTAL SCORE: 9
2. NOT BEING ABLE TO STOP OR CONTROL WORRYING: SEVERAL DAYS
IF YOU CHECKED OFF ANY PROBLEMS ON THIS QUESTIONNAIRE, HOW DIFFICULT HAVE THESE PROBLEMS MADE IT FOR YOU TO DO YOUR WORK, TAKE CARE OF THINGS AT HOME, OR GET ALONG WITH OTHER PEOPLE: SOMEWHAT DIFFICULT
7. FEELING AFRAID AS IF SOMETHING AWFUL MIGHT HAPPEN: SEVERAL DAYS
1. FEELING NERVOUS, ANXIOUS, OR ON EDGE: SEVERAL DAYS
GAD7 TOTAL SCORE: 9
5. BEING SO RESTLESS THAT IT IS HARD TO SIT STILL: MORE THAN HALF THE DAYS
8. IF YOU CHECKED OFF ANY PROBLEMS, HOW DIFFICULT HAVE THESE MADE IT FOR YOU TO DO YOUR WORK, TAKE CARE OF THINGS AT HOME, OR GET ALONG WITH OTHER PEOPLE?: SOMEWHAT DIFFICULT
6. BECOMING EASILY ANNOYED OR IRRITABLE: SEVERAL DAYS
3. WORRYING TOO MUCH ABOUT DIFFERENT THINGS: SEVERAL DAYS
7. FEELING AFRAID AS IF SOMETHING AWFUL MIGHT HAPPEN: SEVERAL DAYS
GAD7 TOTAL SCORE: 9

## 2025-05-21 ASSESSMENT — PAIN SCALES - GENERAL: PAINLEVEL_OUTOF10: SEVERE PAIN (7)

## 2025-05-21 NOTE — NURSING NOTE
"Chief Complaint   Patient presents with    Knee Pain     Right knee pain rated 7, swelling       Initial /80 (BP Location: Right arm, Patient Position: Sitting, Cuff Size: Adult Large)   Pulse 91   Temp 97.8  F (36.6  C) (Tympanic)   Resp 20   Wt 116.3 kg (256 lb 8 oz)   LMP 04/27/2025 (Approximate)   SpO2 98%   BMI 42.68 kg/m   Estimated body mass index is 42.68 kg/m  as calculated from the following:    Height as of 9/25/23: 1.651 m (5' 5\").    Weight as of this encounter: 116.3 kg (256 lb 8 oz).  Medication Review: complete    The next two questions are to help us understand your food security.  If you are feeling you need any assistance in this area, we have resources available to support you today.          9/25/2023   SDOH- Food Insecurity   Within the past 12 months, did you worry that your food would run out before you got money to buy more? N   Within the past 12 months, did the food you bought just not last and you didn t have money to get more? N        Data saved with a previous flowsheet row definition         Health Care Directive:  Patient does not have a Health Care Directive: Discussed advance care planning with patient; however, patient declined at this time.    Shannan Flores LPN      "

## 2025-05-21 NOTE — PROGRESS NOTES
Assessment & Plan   Problem List Items Addressed This Visit    None  Visit Diagnoses         Chronic pain of right knee    -  Primary    Relevant Medications    ketorolac (TORADOL) injection 30 mg (Completed)    cyclobenzaprine (FLEXERIL) 5 MG tablet    Other Relevant Orders    XR Knee Right 3 Views (Completed)    Physical Therapy  Referral    MR Knee Right w/o Contrast      Patellofemoral pain syndrome of right knee        Relevant Medications    ketorolac (TORADOL) injection 30 mg (Completed)    cyclobenzaprine (FLEXERIL) 5 MG tablet      Encounter for screening mammogram for breast cancer        Relevant Orders    MA Screen Bilateral w/Jefry      Perimenopause        Relevant Medications    Drospirenone-Estetrol 3-14.2 MG TABS           Chronic knee pain, worsening. Xray today, some degeneration. Symptoms concerning for overuse injury due to driving/pedal such as patellofemoral syndrome or strain. Recommend naproxen with food BID for 14 days, icing regularly. Offered knee brace. Referral to PT. Toradol today. Given the thigh pain can trial flexeril though unsure that if will provide a lot of benefit. Ordered MRI to further assess     Perimenopause symptoms--irregular periods and mood swing. Vapes occasionally. No previous history of blood clot. Start ocp as above          Depression Screening Follow Up        5/21/2025     2:31 PM   PHQ   PHQ-9 Total Score 9    Q9: Thoughts of better off dead/self-harm past 2 weeks Several days   F/U: Thoughts of suicide or self-harm No   F/U: Safety concerns No       Patient-reported         5/21/2025     2:31 PM   Last PHQ-9   1.  Little interest or pleasure in doing things 1   2.  Feeling down, depressed, or hopeless 1   3.  Trouble falling or staying asleep, or sleeping too much 1   4.  Feeling tired or having little energy 1   5.  Poor appetite or overeating 1   6.  Feeling bad about yourself 1   7.  Trouble concentrating 1   8.  Moving slowly or restless 1   Q9:  Thoughts of better off dead/self-harm past 2 weeks 1   PHQ-9 Total Score 9    In the past two weeks have you had thoughts of suicide or self harm? No   Do you have concerns about your personal safety or the safety of others? No       Patient-reported       Treating perimenopause symptoms. Continue wellbutrin and prozac.     Subjective   Aleks is a 50 year old, presenting for the following health issues:  Knee Pain (Right knee pain rated 7, swelling)    Knee Pain    History of Present Illness       Reason for visit:  Knee leg swelling  Symptom onset:  More than a month  Symptoms include:  Swelling pain in knee leg  Symptom intensity:  Severe  Symptom progression:  Worsening  Had these symptoms before:  Yes  Has tried/received treatment for these symptoms:  Yes  Previous treatment was successful:  No  What makes it worse:  Driving bending  What makes it better:  Not much   She is taking medications regularly.        Right knee pain   Swollen and painful  Hurts up to mid thigh, back of knee and down shin   Drives for work/uses right foot   On going for months   No injury   Has tired tylenol and nsaids, ice  Better with walking, worse sitting and bending etc    Irregular periods  Mood changes  Feels perimenopausal                       Objective    /80 (BP Location: Right arm, Patient Position: Sitting, Cuff Size: Adult Large)   Pulse 91   Temp 97.8  F (36.6  C) (Tympanic)   Resp 20   Wt 116.3 kg (256 lb 8 oz)   LMP 04/27/2025 (Approximate)   SpO2 98%   BMI 42.68 kg/m    Body mass index is 42.68 kg/m .  Physical Exam   GENERAL: alert and no distress  MS: right knee tender to pressure on patella and along medial joint line. Right knee normal end point with varus and valgus strain, negative Lachman's. Tender to palpation anterior thigh    SKIN: no suspicious lesions or rashes  PSYCH: mentation appears normal and affect normal/bright    XR Knee Right 3 Views  Result Date: 5/21/2025  PROCEDURE: XR KNEE RIGHT 3  VIEWS 5/21/2025 3:09 PM HISTORY: months of chronic progressive pain medial joint line and to pressure on patella; Chronic pain of right knee; Chronic pain of right knee COMPARISONS: None. TECHNIQUE: 3 views. FINDINGS: There is mild to moderate narrowing of medial and lateral joint spaces. Patellofemoral joint is fairly well preserved. No fracture is seen. There is no joint effusion or focal bone lesion. There appears to be patella alto with some elevation of the patella relative to the anterior tibial tubercle.        IMPRESSION: Degenerative change. No acute abnormality. LAW RANDOLPH MD   SYSTEM ID:  [id number]        Signed Electronically by: Telma Chadwick MD

## 2025-05-22 DIAGNOSIS — F41.1 ANXIETY STATE: ICD-10-CM

## 2025-05-28 RX ORDER — BUPROPION HYDROCHLORIDE 300 MG/1
300 TABLET ORAL EVERY MORNING
Qty: 90 TABLET | Refills: 3 | Status: SHIPPED | OUTPATIENT
Start: 2025-05-28

## 2025-05-28 NOTE — TELEPHONE ENCOUNTER
[id number] ([facility]) sent Rx request for the following:      Requested Prescriptions   Pending Prescriptions Disp Refills    buPROPion (WELLBUTRIN XL) 300 MG 24 hr tablet [Pharmacy Med Name: BUPROPION 300MG ER (XL) TABLET] 90 tablet 0     Sig: TAKE 1 TABLET (300 MG) BY MOUTH EVERY MORNING       Rx Protocol Bupropion Passed - 5/28/2025  9:22 AM     Last Prescription Date:   12/04/24  Last Fill Qty/Refills:         90, R-0    Last Office Visit:              05/21/25 (Shira Chadwick)   Future Office visit:                Prescription refilled per RN Medication Refill Policy.................... Marva Grant RN ....................  5/28/2025   9:31 AM

## 2025-06-13 ENCOUNTER — RESULTS FOLLOW-UP (OUTPATIENT)
Dept: FAMILY MEDICINE | Facility: CLINIC | Age: 50
End: 2025-06-13

## 2025-07-01 ENCOUNTER — THERAPY VISIT (OUTPATIENT)
Dept: PHYSICAL THERAPY | Facility: OTHER | Age: 50
End: 2025-07-01
Attending: STUDENT IN AN ORGANIZED HEALTH CARE EDUCATION/TRAINING PROGRAM
Payer: COMMERCIAL

## 2025-07-01 DIAGNOSIS — G89.29 CHRONIC PAIN OF RIGHT KNEE: ICD-10-CM

## 2025-07-01 DIAGNOSIS — M25.561 CHRONIC PAIN OF RIGHT KNEE: ICD-10-CM

## 2025-07-01 PROCEDURE — 97161 PT EVAL LOW COMPLEX 20 MIN: CPT | Mod: GP,PO

## 2025-07-01 PROCEDURE — 97110 THERAPEUTIC EXERCISES: CPT | Mod: GP,PO

## 2025-07-01 NOTE — PROGRESS NOTES
PHYSICAL THERAPY EVALUATION  Type of Visit: Evaluation       Fall Risk Screen:  Have you fallen 2 or more times in the past year?: No  Have you fallen and had an injury in the past year?: No  Is patient receiving Physical Therapy Services?: Yes  Fall screen comments: No balance concerns at this time    Subjective         Presenting condition or subjective complaint: Right knee pain    Patient reports onset of right knee pain began last winter.  Patient reports she tried to manage symptoms on her own however symptoms began getting progressively worse.  Patient had MRI on 6/12/2025.  Physical therapy and orthopedic consult were recommended.  Patient has an orthopedic consult on 8/4/2025.  Patient reports knee pain continues to limit her ability to perform prolonged sitting, walking, stairs, lifting and carrying activities.  She reports increased knee pain with driving.  She experiences symptoms of catching in the right knee during range of motion.    Date of onset:      Relevant medical history:   Reviewed medical chart  Dates & types of surgery:  Reviewed medical chart    Prior diagnostic imaging/testing results: MRI   -6/12/2025:  Impression:   1. Medial meniscus tear with fairly severe degenerative change in the  medial compartment.  2. Intact anterior cruciate ligament with probable ganglion cyst  formation adjacent to distal fibers.  3. Small popliteal cyst which may be leaking.      Prior therapy history for the same diagnosis, illness or injury: No      Prior Level of Function  No prior function limitations reported.    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  GAIT: Antalgic gait pattern with decreased left knee swing.    ROM:   Knee range of motion  Extension: Right: 0 degrees-limited by pain.  Left: 5 degrees hyperextension  Flexion: Right: 130 degrees-limited by pain at endrange.  Left: 140 degrees    STRENGTH:   Knee strength:  Extension: 4/5-limited by pain  Flexion: 4/5-limited by pain    SPECIAL TESTS: Limited tolerance for special testing today secondary to muscle guarding left knee    PALPATION: Tenderness palpation on the medial joint line.      Assessment & Plan   CLINICAL IMPRESSIONS  Medical Diagnosis: Chronic pain of right knee (M25.561, G89.29)    Treatment Diagnosis: Right knee pain, impaired range of motion, weakness, impaired neuromuscular control   Impression/Assessment: Patient is a 50 year old female with Chronic pain of right knee (M25.561, G89.29)  complaints.  The following significant findings have been identified: Pain, Decreased ROM/flexibility, Decreased joint mobility, Decreased strength, Decreased proprioception, Impaired gait, Impaired muscle performance, and Decreased activity tolerance. These impairments interfere with their ability to perform self care tasks, work tasks, recreational activities, household chores, driving , household mobility, and community mobility as compared to previous level of function.     Clinical Decision Making (Complexity):  Clinical Presentation: Evolving/Changing  Clinical Presentation Rationale: based on medical and personal factors listed in PT evaluation  Clinical Decision Making (Complexity): Moderate complexity    PLAN OF CARE  Treatment Interventions:  Modalities: Cryotherapy, Vasoneumatic Device  Interventions: Gait Training, Manual  Therapy, Neuromuscular Re-education, Therapeutic Exercise    Long Term Goals     PT Goal 1  Goal Identifier: Right knee pain  Goal Description: Patient will report the ability to drive for 60 minutes without increased right knee pain.  Rationale: to maximize safety and independence with transportation;to maximize safety and independence within the community  Target Date: 08/27/25  PT Goal 2  Goal Identifier: Right knee ROM  Goal Description: Patient will demonstrate full and pain free knee extension to allow walkng 30 minutes with minimal to no difficulty.  Rationale: to maximize safety and independence with performance of ADLs and functional tasks;to maximize safety and independence within the home;to maximize safety and independence within the community  Target Date: 08/27/25  PT Goal 3  Goal Identifier: Stairs  Goal Description: Patient will return to ascending and descending stairs in a reciprocal pattern without pain or difficulty.  Rationale: to maximize safety and independence with performance of ADLs and functional tasks;to maximize safety and independence within the home;to maximize safety and independence within the community  Target Date: 08/27/25      Frequency of Treatment: 16 visits  Duration of Treatment: 8 weeks    Education Assessment:   Learner/Method: Patient;Listening;Reading;Demonstration;Pictures/Video;No Barriers to Learning    Risks and benefits of evaluation/treatment have been explained.   Patient/Family/caregiver agrees with Plan of Care.     Evaluation Time:     PT Eval, Low Complexity Minutes (95978): 15    Signing Clinician: Jimbo Khan PT

## 2025-07-08 ENCOUNTER — THERAPY VISIT (OUTPATIENT)
Dept: PHYSICAL THERAPY | Facility: OTHER | Age: 50
End: 2025-07-08
Attending: STUDENT IN AN ORGANIZED HEALTH CARE EDUCATION/TRAINING PROGRAM
Payer: COMMERCIAL

## 2025-07-08 DIAGNOSIS — G89.29 CHRONIC PAIN OF RIGHT KNEE: Primary | ICD-10-CM

## 2025-07-08 DIAGNOSIS — M25.561 CHRONIC PAIN OF RIGHT KNEE: Primary | ICD-10-CM

## 2025-07-08 PROCEDURE — 97110 THERAPEUTIC EXERCISES: CPT | Mod: GP,PO

## 2025-07-14 ENCOUNTER — MYC REFILL (OUTPATIENT)
Dept: FAMILY MEDICINE | Facility: OTHER | Age: 50
End: 2025-07-14
Payer: COMMERCIAL

## 2025-07-14 DIAGNOSIS — F41.0 PANIC ATTACK: ICD-10-CM

## 2025-07-14 DIAGNOSIS — M25.561 CHRONIC PAIN OF RIGHT KNEE: ICD-10-CM

## 2025-07-14 DIAGNOSIS — G89.29 CHRONIC PAIN OF RIGHT KNEE: ICD-10-CM

## 2025-07-14 RX ORDER — CYCLOBENZAPRINE HCL 5 MG
5 TABLET ORAL
Qty: 30 TABLET | Refills: 1 | OUTPATIENT
Start: 2025-07-14

## 2025-07-14 RX ORDER — LORAZEPAM 0.5 MG/1
TABLET ORAL
Qty: 120 TABLET | Refills: 5 | OUTPATIENT
Start: 2025-07-14

## 2025-07-15 ENCOUNTER — THERAPY VISIT (OUTPATIENT)
Dept: PHYSICAL THERAPY | Facility: OTHER | Age: 50
End: 2025-07-15
Attending: STUDENT IN AN ORGANIZED HEALTH CARE EDUCATION/TRAINING PROGRAM
Payer: COMMERCIAL

## 2025-07-15 DIAGNOSIS — G89.29 CHRONIC PAIN OF RIGHT KNEE: Primary | ICD-10-CM

## 2025-07-15 DIAGNOSIS — F41.0 PANIC ATTACK: ICD-10-CM

## 2025-07-15 DIAGNOSIS — M25.561 CHRONIC PAIN OF RIGHT KNEE: Primary | ICD-10-CM

## 2025-07-15 PROCEDURE — 97110 THERAPEUTIC EXERCISES: CPT | Mod: GP,PO

## 2025-07-15 NOTE — TELEPHONE ENCOUNTER
"    Called and spoke to Patient after verifying last name and date of birth.  Pt was informed of provider response. Pt replied, \"I just saw Dr. Shira Chadwick (in May). I have one day left, and don't see the surgeon until 8/4.\"    Loco Velázquez RN .............. 7/15/2025  4:21 PM  "

## 2025-07-15 NOTE — TELEPHONE ENCOUNTER
Reason for call: Medication or medication refill    Have you contacted your pharmacy regarding this refill? yes    Name of medication requested: Flexeril    How many days of medication do you have left? 1 day    What pharmacy do you use? [facility]    Preferred method for responding to this message: Telephone Call    Phone number patient can be reached at: Cell number on file:    Telephone Information:   Mobile [phone]       If we cannot reach you directly, may we leave a detailed response at the number you provided? Yes    Ana Hernandez on 7/15/2025 at 3:46 PM

## 2025-07-16 ENCOUNTER — OFFICE VISIT (OUTPATIENT)
Dept: FAMILY MEDICINE | Facility: OTHER | Age: 50
End: 2025-07-16
Attending: STUDENT IN AN ORGANIZED HEALTH CARE EDUCATION/TRAINING PROGRAM
Payer: COMMERCIAL

## 2025-07-16 ENCOUNTER — MYC MEDICAL ADVICE (OUTPATIENT)
Dept: FAMILY MEDICINE | Facility: OTHER | Age: 50
End: 2025-07-16
Payer: COMMERCIAL

## 2025-07-16 ENCOUNTER — HOSPITAL ENCOUNTER (OUTPATIENT)
Dept: MAMMOGRAPHY | Facility: OTHER | Age: 50
Discharge: HOME | End: 2025-07-16
Attending: STUDENT IN AN ORGANIZED HEALTH CARE EDUCATION/TRAINING PROGRAM
Payer: COMMERCIAL

## 2025-07-16 VITALS
SYSTOLIC BLOOD PRESSURE: 150 MMHG | OXYGEN SATURATION: 98 % | RESPIRATION RATE: 20 BRPM | HEIGHT: 65 IN | TEMPERATURE: 98.1 F | DIASTOLIC BLOOD PRESSURE: 70 MMHG | WEIGHT: 260.6 LBS | HEART RATE: 79 BPM | BODY MASS INDEX: 43.42 KG/M2

## 2025-07-16 DIAGNOSIS — K04.7 DENTAL INFECTION: Primary | ICD-10-CM

## 2025-07-16 DIAGNOSIS — K08.89 PAIN, DENTAL: ICD-10-CM

## 2025-07-16 DIAGNOSIS — G89.29 CHRONIC PAIN OF RIGHT KNEE: ICD-10-CM

## 2025-07-16 DIAGNOSIS — F41.0 PANIC ATTACK: ICD-10-CM

## 2025-07-16 DIAGNOSIS — M25.561 CHRONIC PAIN OF RIGHT KNEE: ICD-10-CM

## 2025-07-16 DIAGNOSIS — Z12.31 ENCOUNTER FOR SCREENING MAMMOGRAM FOR BREAST CANCER: ICD-10-CM

## 2025-07-16 DIAGNOSIS — I10 ESSENTIAL HYPERTENSION: Primary | ICD-10-CM

## 2025-07-16 PROCEDURE — 77063 BREAST TOMOSYNTHESIS BI: CPT

## 2025-07-16 RX ORDER — CYCLOBENZAPRINE HCL 5 MG
TABLET ORAL
Qty: 30 TABLET | Refills: 0 | Status: SHIPPED | OUTPATIENT
Start: 2025-07-16 | End: 2025-07-16

## 2025-07-16 RX ORDER — LORAZEPAM 0.5 MG/1
TABLET ORAL
Qty: 120 TABLET | Refills: 0 | OUTPATIENT
Start: 2025-07-16

## 2025-07-16 RX ORDER — LORAZEPAM 0.5 MG/1
TABLET ORAL
Qty: 120 TABLET | Refills: 5 | Status: SHIPPED | OUTPATIENT
Start: 2025-07-16

## 2025-07-16 RX ORDER — CYCLOBENZAPRINE HCL 5 MG
5 TABLET ORAL 2 TIMES DAILY PRN
Qty: 30 TABLET | Refills: 4 | Status: SHIPPED | OUTPATIENT
Start: 2025-07-16

## 2025-07-16 ASSESSMENT — ANXIETY QUESTIONNAIRES
5. BEING SO RESTLESS THAT IT IS HARD TO SIT STILL: MORE THAN HALF THE DAYS
6. BECOMING EASILY ANNOYED OR IRRITABLE: MORE THAN HALF THE DAYS
8. IF YOU CHECKED OFF ANY PROBLEMS, HOW DIFFICULT HAVE THESE MADE IT FOR YOU TO DO YOUR WORK, TAKE CARE OF THINGS AT HOME, OR GET ALONG WITH OTHER PEOPLE?: SOMEWHAT DIFFICULT
1. FEELING NERVOUS, ANXIOUS, OR ON EDGE: NEARLY EVERY DAY
GAD7 TOTAL SCORE: 16
3. WORRYING TOO MUCH ABOUT DIFFERENT THINGS: MORE THAN HALF THE DAYS
2. NOT BEING ABLE TO STOP OR CONTROL WORRYING: MORE THAN HALF THE DAYS
7. FEELING AFRAID AS IF SOMETHING AWFUL MIGHT HAPPEN: MORE THAN HALF THE DAYS
7. FEELING AFRAID AS IF SOMETHING AWFUL MIGHT HAPPEN: MORE THAN HALF THE DAYS
4. TROUBLE RELAXING: NEARLY EVERY DAY
GAD7 TOTAL SCORE: 16
IF YOU CHECKED OFF ANY PROBLEMS ON THIS QUESTIONNAIRE, HOW DIFFICULT HAVE THESE PROBLEMS MADE IT FOR YOU TO DO YOUR WORK, TAKE CARE OF THINGS AT HOME, OR GET ALONG WITH OTHER PEOPLE: SOMEWHAT DIFFICULT
GAD7 TOTAL SCORE: 16

## 2025-07-16 ASSESSMENT — PAIN SCALES - GENERAL: PAINLEVEL_OUTOF10: MODERATE PAIN (5)

## 2025-07-16 NOTE — PROGRESS NOTES
"  Assessment & Plan     Chronic pain of right knee  Chronic refilled   - cyclobenzaprine (FLEXERIL) 5 MG tablet; Take 1 tablet (5 mg) by mouth 2 times daily as needed for muscle spasms.    Panic attack  Chronic,  appropriate. Is aware or risks and side effects.   - LORazepam (ATIVAN) 0.5 MG tablet; TAKE 1-2 TABLETS BY MOUTH DAILY AND 2 TABLETS NIGHTLY AS NEEDED FOR ANXIETY/SLEEP    Essential hypertension  Improving, likely due to anxiety, also drank a red bull right before exam     Pain, dental  Mychart after visit said she had dental pain due to broken tooth. Advised nsaids and seeing her dentist. Sent in augmentin.     The longitudinal plan of care for the diagnosis(es)/condition(s) as documented were addressed during this visit. Due to the added complexity in care, I will continue to support Aleks in the subsequent management and with ongoing continuity of care.      BMI  Estimated body mass index is 43.37 kg/m  as calculated from the following:    Height as of this encounter: 1.651 m (5' 5\").    Weight as of this encounter: 118.2 kg (260 lb 9.6 oz).           Subjective   Aleks is a 50 year old, presenting for the following health issues:  Medication Check        7/16/2025    10:53 AM   Additional Questions   Roomed by Tra PERRY     Via the Health Maintenance questionnaire, the patient has reported the following services have been completed -Mammogram: gi 2025-07-18, this information has not been sent to the abstraction team.  HPI      Here for med refill  Takes ativan chronically for anxiety  Coming up on anniversary of her son's accident so anxiety increased    Due for flexeril refill     BP elevated but continues to improve as she sits in the exam room     After the visit she sent a mychart about dental pain for a broken tooth              Review of Systems  Constitutional, HEENT, cardiovascular, pulmonary, gi and gu systems are negative, except as otherwise noted.      Objective    BP (!) 150/70   " "Pulse 79   Temp 98.1  F (36.7  C) (Temporal)   Resp 20   Ht 1.651 m (5' 5\")   Wt 118.2 kg (260 lb 9.6 oz)   LMP 04/27/2025 (Approximate)   SpO2 98%   BMI 43.37 kg/m    Body mass index is 43.37 kg/m .  Physical Exam   GENERAL: alert and no distress  NEURO: Normal strength and tone, mentation intact and speech normal  PSYCH: mentation appears normal, affect normal/bright            Signed Electronically by: Telma Chadwick MD    "

## 2025-07-16 NOTE — TELEPHONE ENCOUNTER
"Forgot to mention her broken tooth with face pain.     \"Can you do something or should I go to the dentist?\"    My Thoughts:  You should see your dentist for this.     Jasmin Preciado RN on 7/16/2025 at 11:43 AM    "

## 2025-07-16 NOTE — NURSING NOTE
"Chief Complaint   Patient presents with    Medication Check   Patient presents to the clinic today for a medication check.     Initial BP (!) 172/72 (BP Location: Right arm, Patient Position: Sitting, Cuff Size: Adult Regular)   Pulse 79   Temp 98.1  F (36.7  C) (Temporal)   Resp 20   Ht 1.651 m (5' 5\")   Wt 118.2 kg (260 lb 9.6 oz)   LMP 04/27/2025 (Approximate)   SpO2 98%   BMI 43.37 kg/m   Estimated body mass index is 43.37 kg/m  as calculated from the following:    Height as of this encounter: 1.651 m (5' 5\").    Weight as of this encounter: 118.2 kg (260 lb 9.6 oz).  Medication Review: complete    The next two questions are to help us understand your food security.  If you are feeling you need any assistance in this area, we have resources available to support you today.          7/16/2025   SDOH- Food Insecurity   Within the past 12 months, did you worry that your food would run out before you got money to buy more? N   Within the past 12 months, did the food you bought just not last and you didn t have money to get more? N         Health Care Directive:  Patient does not have a Health Care Directive: Discussed advance care planning with patient; however, patient declined at this time.    Tra Guevara      "

## 2025-07-16 NOTE — TELEPHONE ENCOUNTER
Is it a broken tooth like infected/infected gums?  I could send in abx. Otherwise recommend nsaids, tylenol, OTC oragel.

## 2025-08-04 ENCOUNTER — OFFICE VISIT (OUTPATIENT)
Dept: ORTHOPEDICS | Facility: OTHER | Age: 50
End: 2025-08-04
Attending: STUDENT IN AN ORGANIZED HEALTH CARE EDUCATION/TRAINING PROGRAM
Payer: COMMERCIAL

## 2025-08-04 VITALS
HEIGHT: 65 IN | RESPIRATION RATE: 16 BRPM | HEART RATE: 67 BPM | BODY MASS INDEX: 43.32 KG/M2 | WEIGHT: 260 LBS | OXYGEN SATURATION: 100 % | DIASTOLIC BLOOD PRESSURE: 82 MMHG | SYSTOLIC BLOOD PRESSURE: 134 MMHG

## 2025-08-04 DIAGNOSIS — M25.561 CHRONIC PAIN OF RIGHT KNEE: Primary | ICD-10-CM

## 2025-08-04 DIAGNOSIS — S83.241A TEAR OF MEDIAL MENISCUS OF RIGHT KNEE, CURRENT, UNSPECIFIED TEAR TYPE, INITIAL ENCOUNTER: ICD-10-CM

## 2025-08-04 DIAGNOSIS — G89.29 CHRONIC PAIN OF RIGHT KNEE: Primary | ICD-10-CM

## 2025-08-04 DIAGNOSIS — M25.562 LEFT KNEE PAIN, UNSPECIFIED CHRONICITY: ICD-10-CM

## 2025-08-04 PROCEDURE — 250N000009 HC RX 250: Performed by: ORTHOPAEDIC SURGERY

## 2025-08-04 PROCEDURE — 250N000011 HC RX IP 250 OP 636: Mod: JZ | Performed by: ORTHOPAEDIC SURGERY

## 2025-08-04 RX ORDER — LIDOCAINE HYDROCHLORIDE 10 MG/ML
4 INJECTION, SOLUTION INFILTRATION; PERINEURAL ONCE
Status: COMPLETED | OUTPATIENT
Start: 2025-08-04 | End: 2025-08-04

## 2025-08-04 RX ORDER — TRIAMCINOLONE ACETONIDE 40 MG/ML
40 INJECTION, SUSPENSION INTRA-ARTICULAR; INTRAMUSCULAR ONCE
Status: COMPLETED | OUTPATIENT
Start: 2025-08-04 | End: 2025-08-04

## 2025-08-04 RX ADMIN — LIDOCAINE HYDROCHLORIDE 4 ML: 10 INJECTION, SOLUTION INFILTRATION; PERINEURAL at 15:08

## 2025-08-04 RX ADMIN — TRIAMCINOLONE ACETONIDE 40 MG: 40 INJECTION, SUSPENSION INTRA-ARTICULAR; INTRAMUSCULAR at 15:08

## 2025-08-04 ASSESSMENT — PAIN SCALES - GENERAL: PAINLEVEL_OUTOF10: SEVERE PAIN (9)

## 2025-08-08 ENCOUNTER — HOSPITAL ENCOUNTER (OUTPATIENT)
Dept: MRI IMAGING | Facility: OTHER | Age: 50
Discharge: HOME | End: 2025-08-08
Attending: ORTHOPAEDIC SURGERY | Admitting: RADIOLOGY
Payer: COMMERCIAL

## 2025-08-08 DIAGNOSIS — M25.562 LEFT KNEE PAIN, UNSPECIFIED CHRONICITY: ICD-10-CM

## 2025-08-08 PROCEDURE — 73721 MRI JNT OF LWR EXTRE W/O DYE: CPT | Mod: 26 | Performed by: RADIOLOGY

## 2025-08-08 PROCEDURE — 73721 MRI JNT OF LWR EXTRE W/O DYE: CPT | Mod: LT

## (undated) DEVICE — ENDO SNARE EXACTO COLD 9MM LOOP 2.4MMX230CM 00711115

## (undated) DEVICE — ENDO KIT COMPLIANCE DYKENDOCMPLY

## (undated) DEVICE — ENDO BRUSH CHANNEL MASTER CLEANING 2-4.2MM BW-412T

## (undated) DEVICE — SOL WATER 1500ML

## (undated) DEVICE — SUCTION MANIFOLD NEPTUNE 2 SYS 4 PORT 0702-020-000

## (undated) DEVICE — TUBING SUCTION 10'X3/16" N510

## (undated) RX ORDER — KETOROLAC TROMETHAMINE 30 MG/ML
INJECTION, SOLUTION INTRAMUSCULAR; INTRAVENOUS
Status: DISPENSED
Start: 2025-05-21

## (undated) RX ORDER — PREDNISONE 20 MG/1
TABLET ORAL
Status: DISPENSED
Start: 2019-06-22

## (undated) RX ORDER — PROPOFOL 10 MG/ML
INJECTION, EMULSION INTRAVENOUS
Status: DISPENSED
Start: 2023-04-06

## (undated) RX ORDER — ASPIRIN 81 MG/1
TABLET, CHEWABLE ORAL
Status: DISPENSED
Start: 2019-06-25

## (undated) RX ORDER — LIDOCAINE HYDROCHLORIDE 10 MG/ML
INJECTION, SOLUTION INFILTRATION; PERINEURAL
Status: DISPENSED
Start: 2023-04-27

## (undated) RX ORDER — METHYLPREDNISOLONE SODIUM SUCCINATE 40 MG/ML
INJECTION, POWDER, LYOPHILIZED, FOR SOLUTION INTRAMUSCULAR; INTRAVENOUS
Status: DISPENSED
Start: 2019-05-10

## (undated) RX ORDER — CEFTRIAXONE SODIUM 1 G
VIAL (EA) INJECTION
Status: DISPENSED
Start: 2019-05-10

## (undated) RX ORDER — TRIAMCINOLONE ACETONIDE 40 MG/ML
INJECTION, SUSPENSION INTRA-ARTICULAR; INTRAMUSCULAR
Status: DISPENSED
Start: 2025-08-04

## (undated) RX ORDER — OXYCODONE HYDROCHLORIDE 5 MG/1
TABLET ORAL
Status: DISPENSED
Start: 2019-06-22

## (undated) RX ORDER — LORAZEPAM 1 MG/1
TABLET ORAL
Status: DISPENSED
Start: 2019-06-25

## (undated) RX ORDER — POTASSIUM CHLORIDE 1500 MG/1
TABLET, EXTENDED RELEASE ORAL
Status: DISPENSED
Start: 2019-06-25

## (undated) RX ORDER — LIDOCAINE HYDROCHLORIDE 10 MG/ML
INJECTION, SOLUTION INFILTRATION; PERINEURAL
Status: DISPENSED
Start: 2025-08-04